# Patient Record
Sex: FEMALE | Race: ASIAN | Employment: FULL TIME
[De-identification: names, ages, dates, MRNs, and addresses within clinical notes are randomized per-mention and may not be internally consistent; named-entity substitution may affect disease eponyms.]

---

## 2017-07-29 ENCOUNTER — HEALTH MAINTENANCE LETTER (OUTPATIENT)
Age: 32
End: 2017-07-29

## 2018-01-14 ENCOUNTER — TRANSFERRED RECORDS (OUTPATIENT)
Dept: HEALTH INFORMATION MANAGEMENT | Facility: CLINIC | Age: 33
End: 2018-01-14

## 2018-01-15 ENCOUNTER — RADIANT APPOINTMENT (OUTPATIENT)
Dept: GENERAL RADIOLOGY | Facility: CLINIC | Age: 33
End: 2018-01-15
Attending: NURSE PRACTITIONER
Payer: OTHER MISCELLANEOUS

## 2018-01-15 ENCOUNTER — OFFICE VISIT (OUTPATIENT)
Dept: FAMILY MEDICINE | Facility: CLINIC | Age: 33
End: 2018-01-15
Payer: OTHER MISCELLANEOUS

## 2018-01-15 VITALS
HEART RATE: 99 BPM | OXYGEN SATURATION: 96 % | TEMPERATURE: 100.3 F | HEIGHT: 61 IN | RESPIRATION RATE: 18 BRPM | BODY MASS INDEX: 40.67 KG/M2 | SYSTOLIC BLOOD PRESSURE: 120 MMHG | DIASTOLIC BLOOD PRESSURE: 80 MMHG | WEIGHT: 215.4 LBS

## 2018-01-15 DIAGNOSIS — M54.32 SCIATICA OF LEFT SIDE WITHOUT BACK PAIN: Primary | ICD-10-CM
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
## 2018-01-15 PROCEDURE — 72100 X-RAY EXAM L-S SPINE 2/3 VWS: CPT

## 2018-01-15 PROCEDURE — 99213 OFFICE O/P EST LOW 20 MIN: CPT | Performed by: NURSE PRACTITIONER

## 2018-01-15 RX ORDER — OMEGA-3 FATTY ACIDS/FISH OIL 300-1000MG
CAPSULE ORAL
COMMUNITY
Start: 2009-11-27 | End: 2018-01-15

## 2018-01-15 RX ORDER — OXYCODONE AND ACETAMINOPHEN 5; 325 MG/1; MG/1
TABLET ORAL
COMMUNITY
Start: 2009-11-27 | End: 2018-03-20

## 2018-01-15 RX ORDER — METHYLPREDNISOLONE 4 MG
TABLET, DOSE PACK ORAL
Qty: 21 TABLET | Refills: 0 | Status: SHIPPED | OUTPATIENT
Start: 2018-01-15 | End: 2018-03-20

## 2018-01-15 RX ORDER — CYCLOBENZAPRINE HCL 10 MG
5-10 TABLET ORAL 3 TIMES DAILY PRN
Qty: 30 TABLET | Refills: 1 | Status: SHIPPED | OUTPATIENT
Start: 2018-01-15 | End: 2018-03-20

## 2018-01-15 ASSESSMENT — PAIN SCALES - GENERAL: PAINLEVEL: MODERATE PAIN (4)

## 2018-01-15 NOTE — LETTER
[facility]  [address] [place] [address]  [place] [address]  [phone]          January 15, 2018    RE:  Santos Bagley                                                                                                                                                       [address] [facility] [address]            To whom it may concern:    Santos Bagley is under my professional care for    Sciatica of left side without back pain  Fall, subsequent encounter   She  may return to work with the following: The employee is ABLE to return to work today.    When the patient returns to work, the following restrictions apply until 1/29/18:  A) Bend: Occasionally (1-3 hours)  B) Squat: Occasionally (1-3 hours)  C) Walk/Stand: Frequently (4-8 hours)  D) Reach Above Shoulders: Frequently (4-8 hours)  E) Lift, carry, push, and pull no more than:  11-20 lbs.Light duty-unable to lift more than 20 pounds.      Sincerely,        Gina Maldonado RN, CNP

## 2018-01-15 NOTE — PROGRESS NOTES
SUBJECTIVE:   Santos Bagley is a 32 year old female who presents to clinic today for the following health issues:      Back Pain       Duration: x3 weeks        Specific cause: none    Description:   Location of pain: low back left  Character of pain: sharp, dull ache, stabbing, burning and cramping  Pain radiation:radiates into the left buttocks and radiates into the left leg  New numbness or weakness in legs, not attributed to pain:  YES- to whole leg.    Intensity: Currently 4/10    History:   Pain interferes with job: YES  History of back problems: no prior back problems  Any previous MRI or X-rays: None  Sees a specialist for back pain:  No  Therapies tried without relief: acetaminophen (Tylenol), cold, heat and muscle relaxants    Alleviating factors:   Improved by: none      Precipitating factors:  Worsened by: Lifting, Bending, Standing, Sitting and Lying Flat    Functional and Psychosocial Screen (Bebeto STarT Back):      Not performed today          Accompanying Signs & Symptoms:  Risk of Fracture:  Recent history of trauma or blunt force  Risk of Cauda Equina:  None  Risk of Infection:  None  Risk of Cancer:  None  Risk of Ankylosing Spondylitis:  Onset at age <35, male, AND morning back stiffness. no     Patient had a slip and fall on the ice, landing on her abdomen 3 weeks ago.  Patient was seen at an Urgent Care, but did not have imaging done.  Patient was given prednisone, percocet, muscle relaxer.  She noted some improvement, but pain has since returned.  Patient was seen in ED yesterday due to pain and was advised to follow-up with primary care for physical therapy.  No imaging was done.      Problem list and histories reviewed & adjusted, as indicated.  Additional history: as documented    Patient Active Problem List   Diagnosis     Chronic tonsillitis     Obesity     Tobacco abuse     Hyperlipidemia LDL goal <160     Past Surgical History:   Procedure Laterality Date     TUBAL LIGATION  2009      "  Social History   Substance Use Topics     Smoking status: Former Smoker     Packs/day: 0.50     Types: Cigarettes     Smokeless tobacco: Never Used      Comment: 2 cig/day     Alcohol use No     History reviewed. No pertinent family history.      Current Outpatient Prescriptions   Medication Sig Dispense Refill     oxyCODONE-acetaminophen (PERCOCET) 5-325 MG per tablet        methylPREDNISolone (MEDROL DOSEPAK) 4 MG tablet Follow package instructions 21 tablet 0     cyclobenzaprine (FLEXERIL) 10 MG tablet Take 0.5-1 tablets (5-10 mg) by mouth 3 times daily as needed for muscle spasms 30 tablet 1     acetaminophen (TYLENOL) 325 MG tablet Take 1-2 tablets by mouth every 6 hours as needed.       Ibuprofen 200 MG capsule Take 200 mg by mouth every 4 hours as needed.       Allergies   Allergen Reactions     Nkda [No Known Drug Allergies]      BP Readings from Last 3 Encounters:   01/15/18 120/80   07/12/16 120/76   01/07/15 122/86    Wt Readings from Last 3 Encounters:   01/15/18 215 lb 6.4 oz (97.7 kg)   07/12/16 217 lb (98.4 kg)   01/07/15 210 lb (95.3 kg)                  Labs reviewed in EPIC        Reviewed and updated as needed this visit by clinical staff       Reviewed and updated as needed this visit by Provider         ROS:  Constitutional, HEENT, cardiovascular, pulmonary, gi and gu systems are negative, except as otherwise noted.      OBJECTIVE:   /80 (BP Location: Right arm, Cuff Size: Adult Large)  Pulse 99  Temp 100.3  F (37.9  C) (Oral)  Resp 18  Ht 5' 0.5\" (1.537 m)  Wt 215 lb 6.4 oz (97.7 kg)  LMP 12/25/2017 (Approximate)  SpO2 96%  Breastfeeding? No  BMI 41.37 kg/m2  Body mass index is 41.37 kg/(m^2).  GENERAL: healthy, alert and no distress  RESP: lungs clear to auscultation - no rales, rhonchi or wheezes  CV: regular rate and rhythm, normal S1 S2, no S3 or S4, no murmur, click or rub, no peripheral edema and peripheral pulses strong  MS: no gross musculoskeletal defects noted, no " edema  Comprehensive back pain exam:  No tenderness, Range of motion not limited by pain, Lower extremity strength functional and equal on both sides, Lower extremity reflexes within normal limits bilaterally, Lower extremity sensation normal and equal on both sides and Straight leg positive on  left, indicating possible ipsilateral radiculopathy  PSYCH: mentation appears normal, affect normal/bright    Diagnostic Test Results:  pending    ASSESSMENT/PLAN:     1. Sciatica of left side without back pain  If no improvement with repeat medrol dosepak, physical therapy, consider MRI in 2-3 weeks.  Rule out fracture with x-ray as below.  - methylPREDNISolone (MEDROL DOSEPAK) 4 MG tablet; Follow package instructions  Dispense: 21 tablet; Refill: 0  - cyclobenzaprine (FLEXERIL) 10 MG tablet; Take 0.5-1 tablets (5-10 mg) by mouth 3 times daily as needed for muscle spasms  Dispense: 30 tablet; Refill: 1  - [facility] PT, HAND, AND CHIROPRACTIC REFERRAL    2. Fall, subsequent encounter    - XR Lumbar Spine 2/3 Views; Future    FUTURE APPOINTMENTS:       - Follow-up for annual visit or as needed    CIPRIANO Perez [facility]

## 2018-01-15 NOTE — MR AVS SNAPSHOT
After Visit Summary   1/15/2018    Santos Bagley    MRN: [id number]           Patient Information     Date Of Birth          1985        Visit Information        Provider Department      1/15/2018 12:20 PM Gina Maldonado APRN [facility]        Today's Diagnoses     Sciatica of left side without back pain    -  1    Fall, subsequent encounter          Care Instructions    [facility]    If you have any questions regarding to your visit please contact your care team:     Team Pink:   Clinic Hours Telephone Number   Internal Medicine:  Dr. Tierra Maldonado, NP       7am-7pm  Monday - Thursday   7am-5pm  Fridays  [phone]  (Appointment scheduling available 24/7)    Questions about your visit?  Team Line  [phone]   Urgent Care - [place]ch and [place] - 11am-9pm Monday-Friday Saturday-Sunday- 9am-5pm   [place] - 5pm-9pm Monday-Friday Saturday-Sunday- 9am-5pm  [phone] - [place]   [phone] - [place]       What options do I have for visits at the clinic other than the traditional office visit?  To expand how we care for you, many of our providers are utilizing electronic visits (e-visits) and telephone visits, when medically appropriate, for interactions with their patients rather than a visit in the clinic.   We also offer nurse visits for many medical concerns. Just like any other service, we will bill your insurance company for this type of visit based on time spent on the phone with your provider. Not all insurance companies cover these visits. Please check with your medical insurance if this type of visit is covered. You will be responsible for any charges that are not paid by your insurance.      E-visits via [employer]:  generally incur a $35.00 fee.  Telephone visits:  Time spent on the phone: *charged based on time that is spent on the phone in increments of 10 minutes. Estimated cost:    5-10 mins $30.00   11-20 mins. $59.00   21-30 mins. $85.00   Use [employer] (secure email communication and access to your chart) to send your primary care provider a message or make an appointment. Ask someone on your Team how to sign up for [employer].    For a Price Quote for your services, please call our [employer] Price Line at [phone].    As always, Thank you for trusting us with your health care needs!    Discharged By:  TEX SANTOS            Follow-ups after your visit        Additional Services     [facility] PT, HAND, AND CHIROPRACTIC REFERRAL       **This order will print in the [facility] Scheduling Office**    Physical Therapy, Hand Therapy and Chiropractic Care are available through:    *[facility]  *[facility]  *[facility]    Call one number to schedule at any of the above locations: [phone].    Your provider has referred you to: Physical Therapy at [facility] or [facility]    Indication/Reason for Referral: Low Back Pain  Onset of Illness: 3 weeks  Therapy Orders: Evaluate and Treat  Special Programs: None  Special Request: Wes Tate      Additional Comments for the Therapist or Chiropractor:     Please be aware that coverage of these services is subject to the terms and limitations of your health insurance plan.  Call member services at your health plan with any benefit or coverage questions.      Please bring the following to your appointment:    *Your personal calendar for scheduling future appointments  *Comfortable clothing                  Future tests that were ordered for you today     Open Future Orders        Priority Expected Expires Ordered    XR Lumbar Spine 2/3 Views Routine 1/15/2018 1/15/2019 1/15/2018            Who to contact     If you have questions or need follow up information about today's clinic visit or your schedule please contact [facility] directly at [phone].  Normal or non-critical lab and imaging results  "will be communicated to you by [employer], letter or phone within 4 business days after the clinic has received the results. If you do not hear from us within 7 days, please contact the clinic through [employer] or phone. If you have a critical or abnormal lab result, we will notify you by phone as soon as possible.  Submit refill requests through [employer] or call your pharmacy and they will forward the refill request to us. Please allow 3 business days for your refill to be completed.          Additional Information About Your Visit        [employer] Information     [employer] gives you secure access to your electronic health record. If you see a primary care provider, you can also send messages to your care team and make appointments. If you have questions, please call your primary care clinic.  If you do not have a primary care provider, please call [phone] and they will assist you.        Care EveryWhere ID     This is your Care EveryWhere ID. This could be used by other organizations to access your [place] medical records  [id number]        Your Vitals Were     Pulse Temperature Respirations Height Last Period Pulse Oximetry    99 100.3  F (37.9  C) (Oral) 18 5' 0.5\" (1.537 m) 12/25/2017 (Approximate) 96%    Breastfeeding? BMI (Body Mass Index)                No 41.37 kg/m2           Blood Pressure from Last 3 Encounters:   01/15/18 120/80   07/12/16 120/76   01/07/15 122/86    Weight from Last 3 Encounters:   01/15/18 215 lb 6.4 oz (97.7 kg)   07/12/16 217 lb (98.4 kg)   01/07/15 210 lb (95.3 kg)              We Performed the Following     [facility] PT, HAND, AND CHIROPRACTIC REFERRAL          Today's Medication Changes          These changes are accurate as of: 1/15/18  1:05 PM.  If you have any questions, ask your nurse or doctor.               Start taking these medicines.        Dose/Directions    cyclobenzaprine 10 MG tablet   Commonly known as:  FLEXERIL   Used for:  Sciatica of left side without back pain "   Started by:  Gina Maldonado APRN CNP        Dose:  5-10 mg   Take 0.5-1 tablets (5-10 mg) by mouth 3 times daily as needed for muscle spasms   Quantity:  30 tablet   Refills:  1       methylPREDNISolone 4 MG tablet   Commonly known as:  MEDROL DOSEPAK   Used for:  Sciatica of left side without back pain   Started by:  Gina Maldonado APRN CNP        Follow package instructions   Quantity:  21 tablet   Refills:  0            Where to get your medicines      These medications were sent to [facility] [address] [facility], [facility] [address]  [id number] [place] [address], [facility] [address]     Phone:  [phone]     cyclobenzaprine 10 MG tablet    methylPREDNISolone 4 MG tablet                Primary Care Provider Office Phone # Fax #    Kristen M Kehr, PA-C [phone] [address] [facility] [address]        Equal Access to Services     CANDY NICOLE : Hadii aad ku hadasho Soomaali, waaxda luqadaha, qaybta kaalmada adeegyada, altagracia madera . So [facility] [phone].    ATENCIÓN: Si bina john, tiene a saxena disposición servicios gratuitos de asistencia lingüística. [facility] [phone].    We comply with applicable federal civil rights laws and [place] laws. We do not discriminate on the basis of race, color, national origin, age, disability, sex, sexual orientation, or gender identity.            Thank you!     Thank you for choosing [facility]  for your care. Our goal is always to provide you with excellent care. Hearing back from our patients is one way we can continue to improve our services. Please take a few minutes to complete the written survey that you may receive in the mail after your visit with us. Thank you!             Your Updated Medication List - Protect others around you: Learn how to safely use, store and throw away your medicines at www.disposemymeds.org.          This list is accurate as of:  1/15/18  1:05 PM.  Always use your most recent med list.                   Brand Name Dispense Instructions for use Diagnosis    cyclobenzaprine 10 MG tablet    FLEXERIL    30 tablet    Take 0.5-1 tablets (5-10 mg) by mouth 3 times daily as needed for muscle spasms    Sciatica of left side without back pain       ibuprofen 200 MG capsule      Take 200 mg by mouth every 4 hours as needed.        methylPREDNISolone 4 MG tablet    MEDROL DOSEPAK    21 tablet    Follow package instructions    Sciatica of left side without back pain       oxyCODONE-acetaminophen 5-325 MG per tablet    PERCOCET          TYLENOL 325 MG tablet   Generic drug:  acetaminophen      Take 1-2 tablets by mouth every 6 hours as needed.

## 2018-01-15 NOTE — NURSING NOTE
"Chief Complaint   Patient presents with     Back Pain     sciatic nerve pain. Due for DAP, PHQ9 and Flu       Initial /80 (BP Location: Right arm, Cuff Size: Adult Large)  Pulse 99  Temp 100.3  F (37.9  C) (Oral)  Resp 18  Ht 5' 0.5\" (1.537 m)  Wt 215 lb 6.4 oz (97.7 kg)  LMP 12/25/2017 (Approximate)  SpO2 96%  Breastfeeding? No  BMI 41.37 kg/m2 Estimated body mass index is 41.37 kg/(m^2) as calculated from the following:    Height as of this encounter: 5' 0.5\" (1.537 m).    Weight as of this encounter: 215 lb 6.4 oz (97.7 kg).  Medication Reconciliation: complete       Alyse Stovall, CMA    "

## 2018-01-15 NOTE — PATIENT INSTRUCTIONS
[facility]    If you have any questions regarding to your visit please contact your care team:     Team Pink:   Clinic Hours Telephone Number   Internal Medicine:  Dr. Tierra Maldonado NP       7am-7pm  Monday - Thursday   7am-5pm  Fridays  [phone]  (Appointment scheduling available 24/7)    Questions about your visit?  Team Line  [phone]   [facility] - 11am-9pm Monday-Friday Saturday-Sunday- 9am-5pm   [place] - 5pm-9pm Monday-Friday Saturday-Sunday- 9am-5pm  [phone] - [place]   [phone] - [place]       What options do I have for visits at the clinic other than the traditional office visit?  To expand how we care for you, many of our providers are utilizing electronic visits (e-visits) and telephone visits, when medically appropriate, for interactions with their patients rather than a visit in the clinic.   We also offer nurse visits for many medical concerns. Just like any other service, we will bill your insurance company for this type of visit based on time spent on the phone with your provider. Not all insurance companies cover these visits. Please check with your medical insurance if this type of visit is covered. You will be responsible for any charges that are not paid by your insurance.      E-visits via [employer]:  generally incur a $35.00 fee.  Telephone visits:  Time spent on the phone: *charged based on time that is spent on the phone in increments of 10 minutes. Estimated cost:   5-10 mins $30.00   11-20 mins. $59.00   21-30 mins. $85.00   Use [employer] (secure email communication and access to your chart) to send your primary care provider a message or make an appointment. Ask someone on your Team how to sign up for [employer].    For a Price Quote for your services, please call our Consumer Price Line at [phone].    As always, Thank you for trusting us with your health care  needs!    Discharged By:  TEX SANTOS

## 2018-01-16 NOTE — PROGRESS NOTES
Dear Santos,    Your recent test results are attached.      No fractures noted.    If you have any questions please feel free to contact [phone] or myself via [employer].    Sincerely,  Gina Maldonado, CNP

## 2018-03-20 ENCOUNTER — OFFICE VISIT (OUTPATIENT)
Dept: FAMILY MEDICINE | Facility: CLINIC | Age: 33
End: 2018-03-20
Payer: OTHER MISCELLANEOUS

## 2018-03-20 VITALS
OXYGEN SATURATION: 96 % | DIASTOLIC BLOOD PRESSURE: 85 MMHG | HEART RATE: 102 BPM | SYSTOLIC BLOOD PRESSURE: 124 MMHG | BODY MASS INDEX: 42.99 KG/M2 | TEMPERATURE: 98.1 F | WEIGHT: 223.8 LBS
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
## 2018-03-20 DIAGNOSIS — M54.50 LUMBAR PAIN WITH RADIATION DOWN LEFT LEG: ICD-10-CM

## 2018-03-20 DIAGNOSIS — M79.605 LUMBAR PAIN WITH RADIATION DOWN LEFT LEG: ICD-10-CM

## 2018-03-20 PROCEDURE — 99214 OFFICE O/P EST MOD 30 MIN: CPT | Performed by: NURSE PRACTITIONER

## 2018-03-20 RX ORDER — LIDOCAINE 50 MG/G
PATCH TOPICAL
Qty: 30 PATCH | Refills: 0 | Status: SHIPPED | OUTPATIENT
Start: 2018-03-20 | End: 2019-01-22

## 2018-03-20 RX ORDER — METHOCARBAMOL 750 MG/1
750 TABLET, FILM COATED ORAL 3 TIMES DAILY PRN
Qty: 60 TABLET | Refills: 0 | Status: SHIPPED | OUTPATIENT
Start: 2018-03-20 | End: 2018-09-25

## 2018-03-20 NOTE — NURSING NOTE
"Chief Complaint   Patient presents with     Pain       Initial /85  Pulse 102  Temp 98.1  F (36.7  C) (Oral)  Wt 223 lb 12.8 oz (101.5 kg)  SpO2 96%  BMI 42.99 kg/m2 Estimated body mass index is 42.99 kg/(m^2) as calculated from the following:    Height as of 1/15/18: 5' 0.5\" (1.537 m).    Weight as of this encounter: 223 lb 12.8 oz (101.5 kg).  Medication Reconciliation: complete     Nany Reyes MA  "

## 2018-03-20 NOTE — PATIENT INSTRUCTIONS
"Take medication as prescribed.  Schedule MRI.  Based on imaging results, we will either order physical therapy, spine referral, chiropractor, etc. Follow up if needed for worsening symptoms.   * Sciatica    Sciatica (\"Lumbar Radiculopathy\") causes a pain that spreads from the lower back down into the buttock, hip and leg. Sometimes leg pain can occur without any back pain. Sciatica is due to irritation or pressure on a spinal nerve as it comes out of the spinal canal. This is most often due to a bulge or rupture of a nearby spinal disk (the cartilage cushion between each spinal bone), which presses on a nearby nerve. Other causes include spinal stenosis (narrowing of the spinal canal) and spasm of the piriformis muscle (a muscle in the buttocks that the sciatic nerve passes through).  Sciatica may begin after a sudden twisting/bending force (such as in a car accident), or sometimes after a simple awkward movement. In either case, muscle spasm is commonly present and contributes to the pain.  The diagnosis of sciatica is made from the symptoms and physical exam. Unless you had a physical injury (such as a car accident or fall), X-rays are usually not ordered for the initial evaluation of sciatica because the nerves and disks cannot be seen on an X-ray. Most sciatica (80-90%) gets better with time.  What can I do about my low back pain?  There are three main things you can do to ease low back pain and help it go away.    Use heat or cold packs.    Take medicine as directed.    Use positions, movements and exercises. Stay active! Too much rest can make your symptoms worse.  Using heat or cold packs  Try cold packs or gentle heat to ease your pain. Use whichever gives the most relief. Apply the cold pack or heat for 15 minutes at a time, as often as needed.  Taking medicine  If taking over-the-counter medicine:    Take ibuprofen (Advil, Motrin) 600 mg. three times a day as needed for pain.  OR    Take Aleve (naproxen " sodium) 220 to 440 mg. two times a day as needed for pain  If your doctor prescribed a muscle relaxant (cyclobenzapine 10 mg.):    Take one half ( ) to 1 tablet at bedtime    Do not drive when taking this medicine. This drug may make you sleepy.  Using positions, movements and exercises  Research tells us that moving your joints and muscles can help you recover from back pain. Such activity should be simple and gentle.  Use the positions below as well as walking to help relieve your discomfort. Try taking a short walk every 3 to 4 hours during the day. Walk for a few minutes inside your home or take longer walks outside, on a treadmill or at a mall. Slowly increase the amount of time you walk. Expect discomfort when you begin, but it should lessen as your back starts to recover.  Finding a position that is comfortable  When your back pain is new, you may find that certain positions will ease your pain. Gently try each of the following positions until you find one that eases your pain. Once you find a position of comfort, use it as often as you like while you recover. Return to your daily routine as soon as possible.     Lie on your back with your legs bent. You can do this by placing a pillow under your knees or lie on the floor and rest your lower legs on the seat of a chair.    Lie on your side with your knees bent and place a pillow between your knees.    Lie on your stomach over pillows.  When should I call my doctor?  Your back pain should improve over the first couple of weeks. As it improves, you should be able to return to your normal activities. But call your doctor if:    You have a sudden change in your ability to control? your bladder or bowels.    You begin to feel tingling in your groin or legs.    The pain spreads down your leg and into your foot.    Your toes, feet or leg muscles begin to feel weak.    You feel generally unwell or sick.    Your pain gets worse.    2821-9587 [employer]  [address] [place] [address]. All rights reserved. This information is not intended as a substitute for professional medical care. Always follow your healthcare professional's instructions.  This information has been modified by your health care provider with permission from the publisher.

## 2018-03-20 NOTE — MR AVS SNAPSHOT
"              After Visit Summary   3/20/2018    Santos Bagley    MRN: [id number]           Patient Information     Date Of Birth          1985        Visit Information        Provider Department      3/20/2018 2:20 PM Diamond Keys NP [facility]        Today's Diagnoses     Lumbar pain with radiation down left leg    -  1      Care Instructions    leann medication as prescribed.  Schedule MRI.  Based on imaging results, we will either order physical therapy, spine referral, chiropractor, etc. Follow up if needed for worsening symptoms.   * Sciatica    Sciatica (\"Lumbar Radiculopathy\") causes a pain that spreads from the lower back down into the buttock, hip and leg. Sometimes leg pain can occur without any back pain. Sciatica is due to irritation or pressure on a spinal nerve as it comes out of the spinal canal. This is most often due to a bulge or rupture of a nearby spinal disk (the cartilage cushion between each spinal bone), which presses on a nearby nerve. Other causes include spinal stenosis (narrowing of the spinal canal) and spasm of the piriformis muscle (a muscle in the buttocks that the sciatic nerve passes through).  Sciatica may begin after a sudden twisting/bending force (such as in a car accident), or sometimes after a simple awkward movement. In either case, muscle spasm is commonly present and contributes to the pain.  The diagnosis of sciatica is made from the symptoms and physical exam. Unless you had a physical injury (such as a car accident or fall), X-rays are usually not ordered for the initial evaluation of sciatica because the nerves and disks cannot be seen on an X-ray. Most sciatica (80-90%) gets better with time.  What can I do about my low back pain?  There are three main things you can do to ease low back pain and help it go away.    Use heat or cold packs.    Take medicine as directed.    Use positions, movements and exercises. Stay active! Too much rest can make your " symptoms worse.  Using heat or cold packs  Try cold packs or gentle heat to ease your pain. Use whichever gives the most relief. Apply the cold pack or heat for 15 minutes at a time, as often as needed.  Taking medicine  If taking over-the-counter medicine:    Take ibuprofen (Advil, Motrin) 600 mg. three times a day as needed for pain.  OR    Take Aleve (naproxen sodium) 220 to 440 mg. two times a day as needed for pain  If your doctor prescribed a muscle relaxant (cyclobenzapine 10 mg.):    Take one half ( ) to 1 tablet at bedtime    Do not drive when taking this medicine. This drug may make you sleepy.  Using positions, movements and exercises  Research tells us that moving your joints and muscles can help you recover from back pain. Such activity should be simple and gentle.  Use the positions below as well as walking to help relieve your discomfort. Try taking a short walk every 3 to 4 hours during the day. Walk for a few minutes inside your home or take longer walks outside, on a treadmill or at a mall. Slowly increase the amount of time you walk. Expect discomfort when you begin, but it should lessen as your back starts to recover.  Finding a position that is comfortable  When your back pain is new, you may find that certain positions will ease your pain. Gently try each of the following positions until you find one that eases your pain. Once you find a position of comfort, use it as often as you like while you recover. Return to your daily routine as soon as possible.     Lie on your back with your legs bent. You can do this by placing a pillow under your knees or lie on the floor and rest your lower legs on the seat of a chair.    Lie on your side with your knees bent and place a pillow between your knees.    Lie on your stomach over pillows.  When should I call my doctor?  Your back pain should improve over the first couple of weeks. As it improves, you should be able to return to your normal activities.  But call your doctor if:    You have a sudden change in your ability to control? your bladder or bowels.    You begin to feel tingling in your groin or legs.    The pain spreads down your leg and into your foot.    Your toes, feet or leg muscles begin to feel weak.    You feel generally unwell or sick.    Your pain gets worse.    1571-5560 [employer]. [address] [place] [address]. All rights reserved. This information is not intended as a substitute for professional medical care. Always follow your healthcare professional's instructions.  This information has been modified by your health care provider with permission from the publisher.                Follow-ups after your visit        Follow-up notes from your care team     Return if symptoms worsen or fail to improve.      Your next 10 appointments already scheduled     Mar 22, 2018  9:20 AM CDT   PHYSICAL with CIPRIANO Cuevas CNP   [facility] ([facility]    [address] [place] [id number] [phone]              Future tests that were ordered for you today     Open Future Orders        Priority Expected Expires Ordered    MR Lumbar Spine w/o Contrast Routine  3/20/2019 3/20/2018            Who to contact     Normal or non-critical lab and imaging results will be communicated to you by MyChart, letter or phone within 4 business days after the clinic has received the results. If you do not hear from us within 7 days, please contact the clinic through MyChart or phone. If you have a critical or abnormal lab result, we will notify you by phone as soon as possible.  Submit refill requests through [employer] or call your pharmacy and they will forward the refill request to us. Please allow 3 business days for your refill to be completed.          If you need to speak with a  for additional information , please call: [phone]             Additional Information About  Your Visit        [employer] Information     [employer] gives you secure access to your electronic health record. If you see a primary care provider, you can also send messages to your care team and make appointments. If you have questions, please call your primary care clinic.  If you do not have a primary care provider, please call [phone] and they will assist you.        Care EveryWhere ID     This is your Care EveryWhere ID. This could be used by other organizations to access your [place] medical records  [id number]        Your Vitals Were     Pulse Temperature Pulse Oximetry BMI (Body Mass Index)          102 98.1  F (36.7  C) (Oral) 96% 42.99 kg/m2         Blood Pressure from Last 3 Encounters:   03/20/18 124/85   01/15/18 120/80   07/12/16 120/76    Weight from Last 3 Encounters:   03/20/18 223 lb 12.8 oz (101.5 kg)   01/15/18 215 lb 6.4 oz (97.7 kg)   07/12/16 217 lb (98.4 kg)                 Today's Medication Changes          These changes are accurate as of 3/20/18  2:41 PM.  If you have any questions, ask your nurse or doctor.               Start taking these medicines.        Dose/Directions    diclofenac 50 MG EC tablet   Commonly known as:  VOLTAREN   Used for:  Lumbar pain with radiation down left leg   Started by:  Diamond Keys NP        Dose:  50 mg   Take 1 tablet (50 mg) by mouth 3 times daily as needed for moderate pain (take with food)   Quantity:  90 tablet   Refills:  1       lidocaine 5 % Patch   Commonly known as:  LIDODERM   Used for:  Lumbar pain with radiation down left leg   Started by:  Diamond Keys NP        Apply up to 3 patches to painful area at once for up to 12 h within a 24 h period.  Remove after 12 hours.   Quantity:  30 patch   Refills:  0       methocarbamol 750 MG tablet   Commonly known as:  ROBAXIN   Used for:  Lumbar pain with radiation down left leg   Started by:  Diamond Keys NP        Dose:  750 mg   Take 1 tablet (750 mg) by mouth 3 times daily as  needed for muscle spasms   Quantity:  60 tablet   Refills:  0         Stop taking these medicines if you haven't already. Please contact your care team if you have questions.     ibuprofen 200 MG capsule   Stopped by:  Diamond Keys NP           TYLENOL 325 MG tablet   Generic drug:  acetaminophen   Stopped by:  Diamond Keys NP                Where to get your medicines      These medications were sent to [facility] [place] - [address] [facility]  [address] [facility] [address]     Phone:  [phone]     diclofenac 50 MG EC tablet    lidocaine 5 % Patch    methocarbamol 750 MG tablet                Primary Care Provider Office Phone # Fax #    Gina Ferguson Ovidio, APRN [facility] [phone]       [address] [facility]  [facility] [address]        Equal Access to Services     [facility]: Hadii aad ku hadasho Soomaali, waaxda luqadaha, qaybta kaalmada adeegyada, waxay teresain haylamont madera . So [facility] [phone].    ATENCIÓN: Si habla español, tiene a saxena disposición servicios gratuitos de asistencia lingüística. Rozina al [phone].    We comply with applicable federal civil rights laws and [place] laws. We do not discriminate on the basis of race, color, national origin, age, disability, sex, sexual orientation, or gender identity.            Thank you!     Thank you for choosing [facility]  for your care. Our goal is always to provide you with excellent care. Hearing back from our patients is one way we can continue to improve our services. Please take a few minutes to complete the written survey that you may receive in the mail after your visit with us. Thank you!             Your Updated Medication List - Protect others around you: Learn how to safely use, store and throw away your medicines at www.disposemymeds.org.          This list is accurate as of 3/20/18  2:41 PM.  Always use your most recent med list.                   Brand  Name Dispense Instructions for use Diagnosis    diclofenac 50 MG EC tablet    VOLTAREN    90 tablet    Take 1 tablet (50 mg) by mouth 3 times daily as needed for moderate pain (take with food)    Lumbar pain with radiation down left leg       lidocaine 5 % Patch    LIDODERM    30 patch    Apply up to 3 patches to painful area at once for up to 12 h within a 24 h period.  Remove after 12 hours.    Lumbar pain with radiation down left leg       methocarbamol 750 MG tablet    ROBAXIN    60 tablet    Take 1 tablet (750 mg) by mouth 3 times daily as needed for muscle spasms    Lumbar pain with radiation down left leg       NAPROXEN PO

## 2018-03-20 NOTE — PROGRESS NOTES
SUBJECTIVE:   Santos Bagley is a 33 year old female who presents to clinic today for the following health issues:      Following up on: back and leg pain- due to fall at work      Last visit this was discussed: 1/15/18 with Bonnie; normal xray per pt    Progression of Symptoms:  Same- currently 8/10    Accompanying Signs & Symptoms: left back pain into left leg-numb, swelling, tingling, cramping in the leg, given out    Taking Medication as prescribed: yes    Side Effects:  None    Medication Helping Symptoms:  NO-went away for a bit but back now; feels chiropractor made pain worse.          Problem list and histories reviewed & adjusted, as indicated.  Additional history: as documented    Patient Active Problem List   Diagnosis     Chronic tonsillitis     Hyperlipidemia LDL goal <160     Class 2 obesity due to excess calories without serious comorbidity with body mass index (BMI) of 35.0 to 35.9 in adult     Impaired fasting glucose     Past Surgical History:   Procedure Laterality Date     TUBAL LIGATION  2009       Social History   Substance Use Topics     Smoking status: Former Smoker     Packs/day: 0.50     Types: Cigarettes     Smokeless tobacco: Never Used      Comment: 2 cig/day     Alcohol use No     History reviewed. No pertinent family history.      Current Outpatient Prescriptions   Medication Sig Dispense Refill     NAPROXEN PO        diclofenac (VOLTAREN) 50 MG EC tablet Take 1 tablet (50 mg) by mouth 3 times daily as needed for moderate pain (take with food) 90 tablet 1     methocarbamol (ROBAXIN) 750 MG tablet Take 1 tablet (750 mg) by mouth 3 times daily as needed for muscle spasms 60 tablet 0     lidocaine (LIDODERM) 5 % Patch Apply up to 3 patches to painful area at once for up to 12 h within a 24 h period.  Remove after 12 hours. (Patient not taking: Reported on 3/22/2018) 30 patch 0     Allergies   Allergen Reactions     Nkda [No Known Drug Allergies]      BP Readings from Last 3 Encounters:    03/22/18 104/72   03/20/18 124/85   01/15/18 120/80    Wt Readings from Last 3 Encounters:   03/22/18 221 lb (100.2 kg)   03/20/18 223 lb 12.8 oz (101.5 kg)   01/15/18 215 lb 6.4 oz (97.7 kg)                  Labs reviewed in EPIC    Reviewed and updated as needed this visit by clinical staff  Tobacco  Allergies  Meds  Med Hx  Surg Hx  Fam Hx  Soc Hx      Reviewed and updated as needed this visit by Provider         ROS:  Constitutional, HEENT, cardiovascular, pulmonary, GI, , musculoskeletal, neuro, skin, endocrine and psych systems are negative, except as otherwise noted.    OBJECTIVE:     /85  Pulse 102  Temp 98.1  F (36.7  C) (Oral)  Wt 223 lb 12.8 oz (101.5 kg)  SpO2 96%  BMI 42.99 kg/m2  Body mass index is 42.99 kg/(m^2).  GENERAL: healthy, alert and no distress  NECK: no adenopathy, no asymmetry, masses, or scars and thyroid normal to palpation  RESP: lungs clear to auscultation - no rales, rhonchi or wheezes  CV: regular rate and rhythm, normal S1 S2, no S3 or S4, no murmur, click or rub, no peripheral edema and peripheral pulses strong  MS: no gross musculoskeletal defects noted, no edema POSITIVE for lumbar back pain with dependent leg raise > 90 degrees. No pain with palpation of spine.   NEURO: Normal strength and tone, mentation intact and speech normal    Diagnostic Test Results:  See orders    ASSESSMENT/PLAN:         ICD-10-CM    1. Fall, subsequent encounter W19.XXXD    2. Lumbar pain with radiation down left leg M54.5 diclofenac (VOLTAREN) 50 MG EC tablet     methocarbamol (ROBAXIN) 750 MG tablet     lidocaine (LIDODERM) 5 % Patch     MR Lumbar Spine w/o Contrast       See Patient Instructions: Take medication as prescribed.  Schedule MRI.  Based on imaging results, we will either order physical therapy, spine referral, chiropractor, etc. Follow up if needed for worsening symptoms.     Diamond Keys, P  [facility]

## 2018-03-21 ENCOUNTER — TELEPHONE (OUTPATIENT)
Dept: FAMILY MEDICINE | Facility: CLINIC | Age: 33
End: 2018-03-21

## 2018-03-21 NOTE — TELEPHONE ENCOUNTER
Hello,    This drug Lidocane 5% Patch requires prior authorization. Please contact insurance at [phone] .   Patient's ID [id number] .    Please contact pharmacy when prior authorization has been approved. We will contact patient when order has been filled.       RxBin: [id number]  RxGroup: [id number]  RxPCN: N/A      Thanks,   Zara Johnson, Pharmacy Tech  [place]

## 2018-03-21 NOTE — TELEPHONE ENCOUNTER
Prior Authorization Retail Medication Request    Medication/Dose: Lidocane 5% Patch  ICD code (if different than what is on RX):  Lumbar pain with radiation down left leg [M54.5]  Previously Tried and Failed:  Chiropractic care felt worse (per patient)  Rationale:  Work related injury    Insurance Name: [phone]  [employer]URANCE (Worker's Comp)  Insurance ID:[id number]:        Pharmacy Information (if different than what is on RX)  Name:  See below  Phone:

## 2018-03-22 ENCOUNTER — TELEPHONE (OUTPATIENT)
Dept: FAMILY MEDICINE | Facility: CLINIC | Age: 33
End: 2018-03-22

## 2018-03-22 ENCOUNTER — OFFICE VISIT (OUTPATIENT)
Dept: FAMILY MEDICINE | Facility: CLINIC | Age: 33
End: 2018-03-22
Payer: COMMERCIAL

## 2018-03-22 ENCOUNTER — RADIANT APPOINTMENT (OUTPATIENT)
Dept: MRI IMAGING | Facility: CLINIC | Age: 33
End: 2018-03-22
Attending: NURSE PRACTITIONER
Payer: COMMERCIAL

## 2018-03-22 VITALS
TEMPERATURE: 97.1 F | HEART RATE: 88 BPM | RESPIRATION RATE: 16 BRPM | OXYGEN SATURATION: 96 % | DIASTOLIC BLOOD PRESSURE: 72 MMHG | WEIGHT: 221 LBS | SYSTOLIC BLOOD PRESSURE: 104 MMHG | HEIGHT: 65 IN | BODY MASS INDEX: 36.82 KG/M2

## 2018-03-22 DIAGNOSIS — M79.605 LUMBAR PAIN WITH RADIATION DOWN LEFT LEG: Primary | ICD-10-CM

## 2018-03-22 DIAGNOSIS — M79.605 LUMBAR PAIN WITH RADIATION DOWN LEFT LEG: ICD-10-CM

## 2018-03-22 DIAGNOSIS — M54.50 LUMBAR PAIN WITH RADIATION DOWN LEFT LEG: ICD-10-CM

## 2018-03-22 DIAGNOSIS — E66.09 CLASS 2 OBESITY DUE TO EXCESS CALORIES WITHOUT SERIOUS COMORBIDITY WITH BODY MASS INDEX (BMI) OF 35.0 TO 35.9 IN ADULT: ICD-10-CM

## 2018-03-22 DIAGNOSIS — Z12.4 SCREENING FOR MALIGNANT NEOPLASM OF CERVIX: ICD-10-CM

## 2018-03-22 DIAGNOSIS — E66.812 CLASS 2 OBESITY DUE TO EXCESS CALORIES WITHOUT SERIOUS COMORBIDITY WITH BODY MASS INDEX (BMI) OF 35.0 TO 35.9 IN ADULT: ICD-10-CM

## 2018-03-22 DIAGNOSIS — Z00.01 ENCOUNTER FOR ROUTINE ADULT PHYSICAL EXAM WITH ABNORMAL FINDINGS: Primary | ICD-10-CM

## 2018-03-22 DIAGNOSIS — M54.50 LUMBAR PAIN WITH RADIATION DOWN LEFT LEG: Primary | ICD-10-CM

## 2018-03-22 DIAGNOSIS — Z23 NEED FOR PROPHYLACTIC VACCINATION WITH TETANUS-DIPHTHERIA (TD): ICD-10-CM

## 2018-03-22 LAB
GLUCOSE SERPL-MCNC: 110 MG/DL (ref 70–99)
TSH SERPL DL<=0.005 MIU/L-ACNC: 1.07 MU/L (ref 0.4–4)

## 2018-03-22 PROCEDURE — 99395 PREV VISIT EST AGE 18-39: CPT | Performed by: NURSE PRACTITIONER

## 2018-03-22 PROCEDURE — G0145 SCR C/V CYTO,THINLAYER,RESCR: HCPCS | Performed by: NURSE PRACTITIONER

## 2018-03-22 PROCEDURE — 72148 MRI LUMBAR SPINE W/O DYE: CPT | Mod: TC

## 2018-03-22 PROCEDURE — 90715 TDAP VACCINE 7 YRS/> IM: CPT | Performed by: NURSE PRACTITIONER

## 2018-03-22 PROCEDURE — 87624 HPV HI-RISK TYP POOLED RSLT: CPT | Performed by: NURSE PRACTITIONER

## 2018-03-22 PROCEDURE — 36415 COLL VENOUS BLD VENIPUNCTURE: CPT | Performed by: NURSE PRACTITIONER

## 2018-03-22 PROCEDURE — 84443 ASSAY THYROID STIM HORMONE: CPT | Performed by: NURSE PRACTITIONER

## 2018-03-22 PROCEDURE — 82947 ASSAY GLUCOSE BLOOD QUANT: CPT | Performed by: NURSE PRACTITIONER

## 2018-03-22 ASSESSMENT — PAIN SCALES - GENERAL: PAINLEVEL: SEVERE PAIN (7)

## 2018-03-22 NOTE — PROGRESS NOTES
SUBJECTIVE:   CC: Santos Bagley is an 33 year old woman who presents for preventive health visit.     Physical   Annual:     Getting at least 3 servings of Calcium per day::  Yes    Bi-annual eye exam::  NO    Dental care twice a year::  Yes    Sleep apnea or symptoms of sleep apnea::  None    Frequency of exercise::  2-3 days/week    Duration of exercise::  15-30 minutes    Taking medications regularly::  Yes    Medication side effects::  None    Additional concerns today::  YES          Patient notes continued weight gain.  She was recently on medrol dose devin and has reduced her activity due to recent back pain.  Patient has gained 20 lbs over the past several years and 10 lbs over the past several weeks.      Patient eats:  AM: egg, toast, coffee with creamer and sugar x2   Lunch: two package of ramen noodles  PM: bitter melon with pork, greens, rice, hot dog without bun  No snacks, root beer to drink       Today's PHQ-2 Score:   PHQ-2 ( 1999 [employer]izer) 3/21/2018   Q1: Little interest or pleasure in doing things 0   Q2: Feeling down, depressed or hopeless 0   PHQ-2 Score 0   Q1: Little interest or pleasure in doing things Not at all   Q2: Feeling down, depressed or hopeless Not at all   PHQ-2 Score 0       Abuse: Current or Past(Physical, Sexual or Emotional)- No  Do you feel safe in your environment - Yes    Social History   Substance Use Topics     Smoking status: Former Smoker     Packs/day: 0.50     Types: Cigarettes     Smokeless tobacco: Never Used      Comment: 2 cig/day     Alcohol use No     Alcohol Use 3/21/2018   If you drink alcohol do you typically have greater than 3 drinks per day OR greater than 7 drinks per week? No   No flowsheet data found.    Reviewed orders with patient.  Reviewed health maintenance and updated orders accordingly - Yes  Labs reviewed in EPIC  BP Readings from Last 3 Encounters:   03/22/18 124/90   03/20/18 124/85   01/15/18 120/80    Wt Readings from Last 3 Encounters:    03/22/18 221 lb (100.2 kg)   03/20/18 223 lb 12.8 oz (101.5 kg)   01/15/18 215 lb 6.4 oz (97.7 kg)                  Patient Active Problem List   Diagnosis     Chronic tonsillitis     Hyperlipidemia LDL goal <160     Class 2 obesity due to excess calories without serious comorbidity with body mass index (BMI) of 35.0 to 35.9 in adult     Past Surgical History:   Procedure Laterality Date     TUBAL LIGATION  2009       Social History   Substance Use Topics     Smoking status: Former Smoker     Packs/day: 0.50     Types: Cigarettes     Smokeless tobacco: Never Used      Comment: 2 cig/day     Alcohol use No     History reviewed. No pertinent family history.      Allergies   Allergen Reactions     Nkda [No Known Drug Allergies]      Recent Labs   Lab Test  09/17/13   1040  08/17/12   0804  11/27/11   1302  11/03/11   1605   04/06/10   0942   LDL  174*  152*  168*   --    < >   --    HDL  31*  31*  29*   --    < >   --    TRIG  106  229*  98   --    < >   --    CR   --    --    --   0.64   --    --    GFRESTIMATED   --    --    --   >90   --    --    GFRESTBLACK   --    --    --   >90   --    --    POTASSIUM   --    --    --   4.0   --    --    TSH   --    --    --   1.43   --   0.81    < > = values in this interval not displayed.        Mammogram not appropriate for this patient based on age.    Pertinent mammograms are reviewed under the imaging tab.  History of abnormal Pap smear:   Last 3 Pap and HPV Results:   PAP / HPV 8/23/2012 1/7/2010   PAP NIL NIL       Reviewed and updated as needed this visit by clinical staff         Reviewed and updated as needed this visit by Provider            Review of Systems  C: NEGATIVE for fever, chills, change in weight  I: NEGATIVE for worrisome rashes, moles or lesions  E: NEGATIVE for vision changes or irritation  ENT: NEGATIVE for ear, mouth and throat problems  R: NEGATIVE for significant cough or SOB  B: NEGATIVE for masses, tenderness or discharge  CV: NEGATIVE for chest  pain, palpitations or peripheral edema  GI: NEGATIVE for nausea, abdominal pain, heartburn, or change in bowel habits  : NEGATIVE for unusual urinary or vaginal symptoms. Periods are regular.  M: NEGATIVE for significant arthralgias or myalgia  N: NEGATIVE for weakness, dizziness or paresthesias  P: NEGATIVE for changes in mood or affect     OBJECTIVE:   There were no vitals taken for this visit.  Physical Exam  GENERAL: alert, no distress and obese  EYES: Eyes grossly normal to inspection, PERRL and conjunctivae and sclerae normal  HENT: ear canals and TM's normal, nose and mouth without ulcers or lesions  NECK: no adenopathy, no asymmetry, masses, or scars and thyroid normal to palpation  RESP: lungs clear to auscultation - no rales, rhonchi or wheezes  BREAST: normal without masses, tenderness or nipple discharge and no palpable axillary masses or adenopathy  CV: regular rate and rhythm, normal S1 S2, no S3 or S4, no murmur, click or rub, no peripheral edema and peripheral pulses strong  ABDOMEN: soft, nontender, no hepatosplenomegaly, no masses and bowel sounds normal   (female): normal female external genitalia, normal urethral meatus, vaginal mucosa, normal cervix/adnexa/uterus without masses or discharge  MS: no gross musculoskeletal defects noted, no edema  PSYCH: mentation appears normal, affect normal/bright    ASSESSMENT/PLAN:   1. Encounter for routine adult physical exam with abnormal findings      2. Screening for malignant neoplasm of cervix    - Pap imaged thin layer screen with HPV - recommended age 30 - 65 years (select HPV order below)  - HPV High Risk Types DNA Cervical    3. Need for prophylactic vaccination with tetanus-diphtheria (TD)    - TDAP VACCINE (ADACEL)    4. Class 2 obesity due to excess calories without serious comorbidity with body mass index (BMI) of 35.0 to 35.9 in adult  Patient to cut out creamer, sugar, and soda.  She will also start recording her calories on My[employer]itness Pal.  " Follow-up in 1 month for recheck and further work on diet.  - Glucose  - TSH with free T4 reflex    COUNSELING:  Reviewed preventive health counseling, as reflected in patient instructions       Regular exercise       Healthy diet/nutrition       Immunizations    Vaccinated for: TDAP               reports that she has quit smoking. Her smoking use included Cigarettes. She smoked 0.50 packs per day. She has never used smokeless tobacco.    Estimated body mass index is 42.99 kg/(m^2) as calculated from the following:    Height as of 1/15/18: 5' 0.5\" (1.537 m).    Weight as of 3/20/18: 223 lb 12.8 oz (101.5 kg).   Weight management plan: Discussed healthy diet and exercise guidelines and patient will follow up in 1 month in clinic to re-evaluate.    Counseling Resources:  ATP IV Guidelines  Pooled Cohorts Equation Calculator  Breast Cancer Risk Calculator  FRAX Risk Assessment  ICSI Preventive Guidelines  Dietary Guidelines for Americans, 2010  USDA's MyPlate  ASA Prophylaxis  Lung CA Screening    CIPRIANO Perez CNP  [facility]  "

## 2018-03-22 NOTE — PROGRESS NOTES
Hesham Graham,    Thank you for your recent office visit.    Here are your recent results.  Your MRI shows- 1)   L5-S1 mild or early degenerative disc disease (which occurs with aging)  2) left lateral recess 0.5 cm caudally dissecting disc herniation contacting  but not displacing the left S1 nerve root  3) moderate right L5 foraminal stenosis.    I am placing a referral for spine to discuss these results and potential treatment options.  Please schedule with them. Follow up as needed.     Feel free to contact me via [employer] or call the clinic at [phone].    Sincerely,    Diamond Keys, CIPRIANO, FNP-BC

## 2018-03-22 NOTE — TELEPHONE ENCOUNTER
Entered by Diamond Keys NP at 3/22/2018  1:15 PM   Read by Santos Bagley at 3/22/2018  2:52 PM   Hi Santos,     Thank you for your recent office visit.     Here are your recent results.  Your MRI shows- 1)   L5-S1 mild or early degenerative disc disease (which occurs with aging)   2) left lateral recess 0.5 cm caudally dissecting disc herniation contacting   but not displacing the left S1 nerve root   3) moderate right L5 foraminal stenosis.     I am placing a referral for spine to discuss these results and potential treatment options.  Please schedule with them. Follow up as needed.     Feel free to contact me via [employer] or call the clinic at [phone].     Sincerely,     CIPRIANO Wen, FNP-BC

## 2018-03-22 NOTE — MR AVS SNAPSHOT
After Visit Summary   3/22/2018    Santos Bagley    MRN: [id number]           Patient Information     Date Of Birth          1985        Visit Information        Provider Department      3/22/2018 9:20 AM Gina Maldonado APRN [facility]        Today's Diagnoses     Encounter for routine adult physical exam with abnormal findings    -  1    Screening for malignant neoplasm of cervix        Need for prophylactic vaccination with tetanus-diphtheria (TD)        Class 2 obesity due to excess calories without serious comorbidity with body mass index (BMI) of 35.0 to 35.9 in adult          Care Instructions    Cut out sodas, creamer and sugar.  Download My Fitness Pal Sanjay.  Follow-up in 1 month to discuss weight.    [facility]    If you have any questions regarding to your visit please contact your care team:     Team Pink:   Clinic Hours Telephone Number   Internal Medicine:  Dr. Tierra Maldonado, NP       7am-7pm  Monday - Thursday   7am-5pm  Fridays  [phone]  (Appointment scheduling available 24/7)    Questions about your visit?  Team Line  [phone]   Urgent Care - [place] [facility] - 11am-9pm Monday-Friday Saturday-Sunday- 9am-5pm   [place] - 5pm-9pm Monday-Friday Saturday-Sunday- 9am-5pm  [phone] - [place]   [phone] - [place]       What options do I have for visits at the clinic other than the traditional office visit?  To expand how we care for you, many of our providers are utilizing electronic visits (e-visits) and telephone visits, when medically appropriate, for interactions with their patients rather than a visit in the clinic.   We also offer nurse visits for many medical concerns. Just like any other service, we will bill your insurance company for this type of visit based on time spent on the phone with your provider. Not all insurance companies cover these visits. Please  check with your medical insurance if this type of visit is covered. You will be responsible for any charges that are not paid by your insurance.      E-visits via [employer]:  generally incur a $35.00 fee.  Telephone visits:  Time spent on the phone: *charged based on time that is spent on the phone in increments of 10 minutes. Estimated cost:   5-10 mins $30.00   11-20 mins. $59.00   21-30 mins. $85.00   Use Lessno (secure email communication and access to your chart) to send your primary care provider a message or make an appointment. Ask someone on your Team how to sign up for Lessno.    For a Price Quote for your services, please call our SocialVolt Line at [phone].    As always, Thank you for trusting us with your health care needs!    Discharged By:  TEX SANTOS            Follow-ups after your visit        Your next 10 appointments already scheduled     Mar 22, 2018 11:00 AM CDT   (Arrive by 10:45 AM)   MR LUMBAR SPINE W/O CONTRAST with [id number]   [facility] ([facility]    [address] [address] [phone]           Take your medicines as usual, unless your doctor tells you not to. Bring a list of your current medicines to your exam (including vitamins, minerals and over-the-counter drugs). Also bring the results of similar scans you may have had.  Please remove any body piercings and hair extensions before you arrive.  Follow your doctor s orders. If you do not, we may have to postpone your exam.  You may or may not receive IV contrast for this exam pending the discretion of the Radiologist.  You do not need to do anything special to prepare.  The MRI machine uses a strong magnet. Please wear clothes without metal (snaps, zippers). A sweatsuit works well, or we may give you a hospital gown.   **IMPORTANT** THE INSTRUCTIONS BELOW ARE ONLY FOR THOSE PATIENTS WHO HAVE BEEN PRESCRIBED SEDATION OR GENERAL ANESTHESIA DURING THEIR MRI PROCEDURE:  IF YOUR  DOCTOR PRESCRIBED ORAL SEDATION (take medicine to help you relax during your exam):   You must get the medicine from your doctor (oral medication) before you arrive. Bring the medicine to the exam. Do not take it at home. You ll be told when to take it upon arriving for your exam.   Arrive one hour early. Bring someone who can take you home after the test. Your medicine will make you sleepy. After the exam, you may not drive, take a bus or take a taxi by yourself.  IF YOUR DOCTOR PRESCRIBED IV SEDATION:   Arrive one hour early. Bring someone who can take you home after the test. Your medicine will make you sleepy. After the exam, you may not drive, take a bus or take a taxi by yourself.   No eating 6 hours before your exam. You may have clear liquids up until 4 hours before your exam. (Clear liquids include water, clear tea, black coffee and fruit juice without pulp.)  IF YOUR DOCTOR PRESCRIBED ANESTHESIA (be asleep for your exam):   Arrive 1 1/2 hours early. Bring someone who can take you home after the test. You may not drive, take a bus or take a taxi by yourself.   No eating 8 hours before your exam. You may have clear liquids up until 4 hours before your exam. (Clear liquids include water, clear tea, black coffee and fruit juice without pulp.)   You will spend four to five hours in the recovery room.  Please call the Imaging Department at your exam site with any questions.              Who to contact     If you have questions or need follow up information about today's clinic visit or your schedule please contact [facility] directly at [phone].  Normal or non-critical lab and imaging results will be communicated to you by MyChart, letter or phone within 4 business days after the clinic has received the results. If you do not hear from us within 7 days, please contact the clinic through MyChart or phone. If you have a critical or abnormal lab result, we will notify you by phone as soon as  "possible.  Submit refill requests through MyCheck or call your pharmacy and they will forward the refill request to us. Please allow 3 business days for your refill to be completed.          Additional Information About Your Visit        [employer] Information     MyCheck gives you secure access to your electronic health record. If you see a primary care provider, you can also send messages to your care team and make appointments. If you have questions, please call your primary care clinic.  If you do not have a primary care provider, please call [phone] and they will assist you.        Care EveryWhere ID     This is your Care EveryWhere ID. This could be used by other organizations to access your [facility] medical records  [id number]        Your Vitals Were     Pulse Temperature Respirations Height Last Period Pulse Oximetry    88 97.1  F (36.2  C) (Oral) 16 5' 5\" (1.651 m) 02/09/2018 96%    BMI (Body Mass Index)                   36.78 kg/m2            Blood Pressure from Last 3 Encounters:   03/22/18 104/72   03/20/18 124/85   01/15/18 120/80    Weight from Last 3 Encounters:   03/22/18 221 lb (100.2 kg)   03/20/18 223 lb 12.8 oz (101.5 kg)   01/15/18 215 lb 6.4 oz (97.7 kg)              We Performed the Following     Glucose     HPV High Risk Types DNA Cervical     Pap imaged thin layer screen with HPV - recommended age 30 - 65 years (select HPV order below)     TDAP VACCINE (ADACEL)     TSH with free T4 reflex        Primary Care Provider Office Phone # Fax #    Gina Nicky Maldonado, APRN [phone]       [address] [address]        Equal Access to Services     NI NICOLE : Hadii adrianne enciso Soilana, waaxda luqadaha, qaybta kaalmada altagracia brown. So [facility] [phone].    ATENCIÓN: Si habla español, tiene a saxena disposición servicios gratuitos de asistencia lingüística. Llame al [phone].    We comply with applicable " federal civil rights laws and [place] laws. We do not discriminate on the basis of race, color, national origin, age, disability, sex, sexual orientation, or gender identity.            Thank you!     Thank you for choosing [facility]  for your care. Our goal is always to provide you with excellent care. Hearing back from our patients is one way we can continue to improve our services. Please take a few minutes to complete the written survey that you may receive in the mail after your visit with us. Thank you!             Your Updated Medication List - Protect others around you: Learn how to safely use, store and throw away your medicines at www.disposemymeds.org.          This list is accurate as of 3/22/18 10:10 AM.  Always use your most recent med list.                   Brand Name Dispense Instructions for use Diagnosis    diclofenac 50 MG EC tablet    VOLTAREN    90 tablet    Take 1 tablet (50 mg) by mouth 3 times daily as needed for moderate pain (take with food)    Lumbar pain with radiation down left leg       lidocaine 5 % Patch    LIDODERM    30 patch    Apply up to 3 patches to painful area at once for up to 12 h within a 24 h period.  Remove after 12 hours.    Lumbar pain with radiation down left leg       methocarbamol 750 MG tablet    ROBAXIN    60 tablet    Take 1 tablet (750 mg) by mouth 3 times daily as needed for muscle spasms    Lumbar pain with radiation down left leg       NAPROXEN PO

## 2018-03-22 NOTE — TELEPHONE ENCOUNTER
Called patient, unable to leave message-no vm. Nany Reyes MA   Mychart sent as well. Nany Reyes MA       Please see what questions the patient has. The labs results are not back yet. Nany Reyes MA

## 2018-03-22 NOTE — NURSING NOTE
Screening Questionnaire for Adult Immunization    Are you sick today?   No   Do you have allergies to medications, food, a vaccine component or latex?   No   Have you ever had a serious reaction after receiving a vaccination?   No   Do you have a long-term health problem with heart disease, lung disease, asthma, kidney disease, metabolic disease (e.g. diabetes), anemia, or other blood disorder?   No   Do you have cancer, leukemia, HIV/AIDS, or any other immune system problem?   No   In the past 3 months, have you taken medications that affect  your immune system, such as prednisone, other steroids, or anticancer drugs; drugs for the treatment of rheumatoid arthritis, Crohn s disease, or psoriasis; or have you had radiation treatments?   No   Have you had a seizure, or a brain or other nervous system problem?   No   During the past year, have you received a transfusion of blood or blood     products, or been given immune (gamma) globulin or antiviral drug?   No   For women: Are you pregnant or is there a chance you could become        pregnant during the next month?   No   Have you received any vaccinations in the past 4 weeks?   No     Immunization questionnaire answers were all negative.        Per orders of Dr. Gina Maldonado, injection of Tdap given by Tanya Ren. Patient instructed to remain in clinic for 15 minutes afterwards, and to report any adverse reaction to me immediately.       Screening performed by Tanya Ren on 3/22/2018 at 11:54 AM.

## 2018-03-22 NOTE — PATIENT INSTRUCTIONS
Cut out sodas, creamer and sugar.  Download My Fitness Pal Sanjay.  Follow-up in 1 month to discuss weight.    [facility]    If you have any questions regarding to your visit please contact your care team:     Team Pink:   Clinic Hours Telephone Number   Internal Medicine:  Dr. Tierra Maldonado, NP       7am-7pm  Monday - Thursday   7am-5pm  Fridays  [phone]  (Appointment scheduling available 24/7)    Questions about your visit?  Team Line  [phone]   Urgent Care - [place] - 11am-9pm Monday-Friday Saturday-Sunday- 9am-5pm   [place] - 5pm-9pm Monday-Friday Saturday-Sunday- 9am-5pm  [phone] - [place]   [phone] - [place]       What options do I have for visits at the clinic other than the traditional office visit?  To expand how we care for you, many of our providers are utilizing electronic visits (e-visits) and telephone visits, when medically appropriate, for interactions with their patients rather than a visit in the clinic.   We also offer nurse visits for many medical concerns. Just like any other service, we will bill your insurance company for this type of visit based on time spent on the phone with your provider. Not all insurance companies cover these visits. Please check with your medical insurance if this type of visit is covered. You will be responsible for any charges that are not paid by your insurance.      E-visits via Brightblue:  generally incur a $35.00 fee.  Telephone visits:  Time spent on the phone: *charged based on time that is spent on the phone in increments of 10 minutes. Estimated cost:   5-10 mins $30.00   11-20 mins. $59.00   21-30 mins. $85.00   Use [employer] (secure email communication and access to your chart) to send your primary care provider a message or make an appointment. Ask someone on your Team how to sign up for Brightblue.    For a Price Quote for your services, please call our  Consumer V[employer] Line at [phone].    As always, Thank you for trusting us with your health care needs!    Discharged By:  TEX SANTOS

## 2018-03-22 NOTE — LETTER
My Depression Action Plan  Name: Santos Bagley   Date of Birth 1985  Date: 3/22/2018    My doctor: Gina Maldonado   My clinic: [address]  [place] [address]  [phone]          GREEN    ZONE   Good Control    What it looks like:     Things are going generally well. You have normal up s and down s. You may even feel depressed from time to time, but bad moods usually last less than a day.   What you need to do:  1. Continue to care for yourself (see self care plan)  2. Check your depression survival kit and update it as needed  3. Follow your physician s recommendations including any medication.  4. Do not stop taking medication unless you consult with your physician first.           YELLOW         ZONE Getting Worse    What it looks like:     Depression is starting to interfere with your life.     It may be hard to get out of bed; you may be starting to isolate yourself from others.    Symptoms of depression are starting to last most all day and this has happened for several days.     You may have suicidal thoughts but they are not constant.   What you need to do:     1. Call your care team, your response to treatment will improve if you keep your care team informed of your progress. Yellow periods are signs an adjustment may need to be made.     2. Continue your self-care, even if you have to fake it!    3. Talk to someone in your support network    4. Open up your depression survival kit           RED    ZONE Medical Alert - Get Help    What it looks like:     Depression is seriously interfering with your life.     You may experience these or other symptoms: You can t get out of bed most days, can t work or engage in other necessary activities, you have trouble taking care of basic hygiene, or basic responsibilities, thoughts of suicide or death that will not go away, self-injurious behavior.     What you need to do:  1. Call your care team and  request a same-day appointment. If they are not available (weekends or after hours) call your local crisis line, emergency room or 911.            Depression Self Care Plan / Survival Kit    Self-Care for Depression  Here s the deal. Your body and mind are really not as separate as most people think.  What you do and think affects how you feel and how you feel influences what you do and think. This means if you do things that people who feel good do, it will help you feel better.  Sometimes this is all it takes.  There is also a place for medication and therapy depending on how severe your depression is, so be sure to consult with your medical provider and/ or Behavioral Health Consultant if your symptoms are worsening or not improving.     In order to better manage my stress, I will:    Exercise  Get some form of exercise, every day. This will help reduce pain and release endorphins, the  feel good  chemicals in your brain. This is almost as good as taking antidepressants!  This is not the same as joining a gym and then never going! (they count on that by the way ) It can be as simple as just going for a walk or doing some gardening, anything that will get you moving.      Hygiene   Maintain good hygiene (Get out of bed in the morning, Make your bed, Brush your teeth, Take a shower, and Get dressed like you were going to work, even if you are unemployed).  If your clothes don't fit try to get ones that do.    Diet  I will strive to eat foods that are good for me, drink plenty of water, and avoid excessive sugar, caffeine, alcohol, and other mood-altering substances.  Some foods that are helpful in depression are: complex carbohydrates, B vitamins, flaxseed, fish or fish oil, fresh fruits and vegetables.    Psychotherapy  I agree to participate in Individual Therapy (if recommended).    Medication  If prescribed medications, I agree to take them.  Missing doses can result in serious side effects.  I understand that  drinking alcohol, or other illicit drug use, may cause potential side effects.  I will not stop my medication abruptly without first discussing it with my provider.    Staying Connected With Others  I will stay in touch with my friends, family members, and my primary care provider/team.    Use your imagination  Be creative.  We all have a creative side; it doesn t matter if it s oil painting, sand castles, or mud pies! This will also kick up the endorphins.    Witness Beauty  (AKA stop and smell the roses) Take a look outside, even in mid-winter. Notice colors, textures. Watch the squirrels and birds.     Service to others  Be of service to others.  There is always someone else in need.  By helping others we can  get out of ourselves  and remember the really important things.  This also provides opportunities for practicing all the other parts of the program.    Humor  Laugh and be silly!  Adjust your TV habits for less news and crime-drama and more comedy.    Control your stress  Try breathing deep, massage therapy, biofeedback, and meditation. Find time to relax each day.     My support system    Clinic Contact:  Phone number:    Contact 1:  Phone number:    Contact 2:  Phone number:    Taoism/:  Phone number:    Therapist:  Phone number:    Local crisis center:    Phone number:    Other community support:  Phone number:

## 2018-03-22 NOTE — TELEPHONE ENCOUNTER
Patient returned call and said you called her home phone and you should be calling her at [phone].  States she actually would like the results of her MRI.  Please call again.

## 2018-03-23 PROBLEM — R73.01 IMPAIRED FASTING GLUCOSE: Status: ACTIVE | Noted: 2018-03-23

## 2018-03-23 LAB
COPATH REPORT: NORMAL
PAP: NORMAL

## 2018-03-23 ASSESSMENT — PATIENT HEALTH QUESTIONNAIRE - PHQ9: SUM OF ALL RESPONSES TO PHQ QUESTIONS 1-9: 2

## 2018-03-23 NOTE — PROGRESS NOTES
"Dear Santos,    Your recent test results are attached.      Normal thyroid.  Your fasting glucose is elevated and puts you in the range of what's considered impaired fasting glucose or \"pre-diabetes\".  This increases your risk of developing Type 2 Diabetes in the future.  Normal glucose is considered less than 100 mg/dL and impaired fasting glucose ranges from 100-125 mg/dL.  Diet and exercise can help improve this number.  I would recommend trying to eat a well balanced diet with fruits, vegetables, whole grains, low fat dairy, and lean protein.  I would also recommend try to increase your physical activity to the point that you are exercising 30-60 minutes per day 5 or more days per week. Please contact clinic if you are interested in attending a pre-diabetes class.      If you have any questions please feel free to contact [phone] or myself via Package [employer].    Sincerely,  Gina Maldonado, CNP  "

## 2018-03-26 ENCOUNTER — OFFICE VISIT (OUTPATIENT)
Dept: ORTHOPEDICS | Facility: CLINIC | Age: 33
End: 2018-03-26
Payer: OTHER MISCELLANEOUS

## 2018-03-26 VITALS
SYSTOLIC BLOOD PRESSURE: 122 MMHG | BODY MASS INDEX: 41.72 KG/M2 | DIASTOLIC BLOOD PRESSURE: 80 MMHG | WEIGHT: 221 LBS | HEIGHT: 61 IN

## 2018-03-26 DIAGNOSIS — M54.16 LUMBAR RADICULOPATHY: Primary | ICD-10-CM

## 2018-03-26 DIAGNOSIS — M51.26 LUMBAR DISC HERNIATION: ICD-10-CM

## 2018-03-26 PROCEDURE — 99244 OFF/OP CNSLTJ NEW/EST MOD 40: CPT | Performed by: PEDIATRICS

## 2018-03-26 RX ORDER — PREDNISONE 20 MG/1
20 TABLET ORAL DAILY
Qty: 5 TABLET | Refills: 0 | Status: SHIPPED | OUTPATIENT
Start: 2018-03-26 | End: 2018-09-25

## 2018-03-26 NOTE — MR AVS SNAPSHOT
After Visit Summary   3/26/2018    Santos Bagley    MRN: [id number]           Patient Information     Date Of Birth          1985        Visit Information        Provider Department      3/26/2018 5:20 PM Anna Ventura MD [facility] [place]        Today's Diagnoses     Lumbar radiculopathy    -  1    Lumbar disc herniation          Care Instructions    Plan:  - Today's Plan of Care:  Prescription Medication as directed: Prednisone--stop Voltaren while taking prednisone  Work Restrictions  Rehab: Physical Therapy: [facility] - [phone]    -We also discussed other future treatment options:  Injection or referral    Follow Up: 1 month    If you have any further questions for your physician or physician s care team you can call [phone] and use option 3 to leave a voice message. Calls received during business hours will be returned same day.                Follow-ups after your visit        Additional Services     [facility] PT, HAND, AND CHIROPRACTIC REFERRAL       **This order will print in the [facility] Scheduling Office**    Physical Therapy, Hand Therapy and Chiropractic Care are available through:    *[facility]  *[facility]  *[facility]    Call one number to schedule at any of the above locations: [phone].    Your provider has referred you to: Physical Therapy at [facility] or [facility]    Indication/Reason for Referral: Low Back Pain  Onset of Illness: 12/20/17  Therapy Orders: Evaluate and Treat  Special Programs: None  Special Request: None     Additional Comments for the Therapist or Chiropractor: None    Please be aware that coverage of these services is subject to the terms and limitations of your health insurance plan.  Call member services at your health plan with any benefit or coverage questions.      Please bring the following to your appointment:    *Your personal calendar for scheduling  "future appointments  *Comfortable clothing                  Who to contact     If you have questions or need follow up information about today's clinic visit or your schedule please contact [facility] directly at [phone].  Normal or non-critical lab and imaging results will be communicated to you by MyChart, letter or phone within 4 business days after the clinic has received the results. If you do not hear from us within 7 days, please contact the clinic through [employer] or phone. If you have a critical or abnormal lab result, we will notify you by phone as soon as possible.  Submit refill requests through [employer] or call your pharmacy and they will forward the refill request to us. Please allow 3 business days for your refill to be completed.          Additional Information About Your Visit        [employer] Information     [employer] gives you secure access to your electronic health record. If you see a primary care provider, you can also send messages to your care team and make appointments. If you have questions, please call your primary care clinic.  If you do not have a primary care provider, please call [phone] and they will assist you.        Care EveryWhere ID     This is your Care EveryWhere ID. This could be used by other organizations to access your [place] medical records  [id number]        Your Vitals Were     Height BMI (Body Mass Index)                5' 0.5\" (1.537 m) 42.45 kg/m2           Blood Pressure from Last 3 Encounters:   03/26/18 122/80   03/22/18 104/72   03/20/18 124/85    Weight from Last 3 Encounters:   03/26/18 221 lb (100.2 kg)   03/22/18 221 lb (100.2 kg)   03/20/18 223 lb 12.8 oz (101.5 kg)              We Performed the Following     [facility] PT, HAND, AND CHIROPRACTIC REFERRAL          Today's Medication Changes          These changes are accurate as of 3/26/18  6:05 PM.  If you have any questions, ask your nurse or doctor.               Start " taking these medicines.        Dose/Directions    predniSONE 20 MG tablet   Commonly known as:  DELTASONE   Used for:  Lumbar radiculopathy, Lumbar disc herniation        Dose:  20 mg   Take 1 tablet (20 mg) by mouth daily   Quantity:  5 tablet   Refills:  0            Where to get your medicines      These medications were sent to [facility] [place] - [address] [address]  [address] [address]     Phone:  [phone]     predniSONE 20 MG tablet                Primary Care Provider Office Phone # Fax #    Gina Saunders Marty Nolan, APRN [facility] [phone] [address] [facility]  [facility] [address]        Equal Access to Services     CANDY NICOLE : Hadii adrianne valero hadasho Soomaali, waaxda luqadaha, qaybta kaalmada adeegyada, altagracia madera . So [facility] [phone].    ATENCIÓN: Si habla español, tiene a saxena disposición servicios gratuitos de asistencia lingüística. Llame al [phone].    We comply with applicable federal civil rights laws and [place] laws. We do not discriminate on the basis of race, color, national origin, age, disability, sex, sexual orientation, or gender identity.            Thank you!     Thank you for choosing [facility]  for your care. Our goal is always to provide you with excellent care. Hearing back from our patients is one way we can continue to improve our services. Please take a few minutes to complete the written survey that you may receive in the mail after your visit with us. Thank you!             Your Updated Medication List - Protect others around you: Learn how to safely use, store and throw away your medicines at www.disposemymeds.org.          This list is accurate as of 3/26/18  6:05 PM.  Always use your most recent med list.                   Brand Name Dispense Instructions for use Diagnosis    diclofenac 50 MG EC tablet    VOLTAREN    90 tablet    Take 1 tablet  (50 mg) by mouth 3 times daily as needed for moderate pain (take with food)    Lumbar pain with radiation down left leg       lidocaine 5 % Patch    LIDODERM    30 patch    Apply up to 3 patches to painful area at once for up to 12 h within a 24 h period.  Remove after 12 hours.    Lumbar pain with radiation down left leg       methocarbamol 750 MG tablet    ROBAXIN    60 tablet    Take 1 tablet (750 mg) by mouth 3 times daily as needed for muscle spasms    Lumbar pain with radiation down left leg       NAPROXEN PO           predniSONE 20 MG tablet    DELTASONE    5 tablet    Take 1 tablet (20 mg) by mouth daily    Lumbar radiculopathy, Lumbar disc herniation       TYLENOL PO      Take by mouth 3 times daily

## 2018-03-26 NOTE — LETTER
3/26/2018         RE: Santos Bagley  [address] [place] [id number]        Dear Colleague,    Thank you for referring your patient, Santos Bagley, to the [facility]. Please see a copy of my visit note below.    Sports Medicine Clinic Visit    PCP: Gina Maldonado    Santos Bagley is a 33 year old female who is seen  in consultation at the request of Diamond Keys N.P. presenting with low back pain.    Injury: Patient reports an injury ~ 3 months ago. Slipped on ice and fell at work.  Patient has tried medications (prednisone helped for a bit) and 1 week of chiropractic care along with medications.  Reports pain into her left leg, feels like cramping.    Santos was asked to complete the Oswestry Low Back Disability Index and Bebeto Start Back screening tool.  today in the office.  Disability score: 54%.     Location of Pain: left low back and posterior left leg to knee  Duration of Pain: 12/20/2017   Rating of Pain at worst: 10/10  Rating of Pain Currently: 7/10 with medication  Symptoms are better with: Tylenol and Other medications, topical analgesics  Symptoms are worse with: Walking, standing, sitting, bending/leaning forward, lifting  Additional Features:   Positive: cramping pain in posterior left leg to knee, weakness   Negative: swelling, bruising, popping, grinding, catching, locking, instability, paresthesias, numbness, pain in other joints and systemic symptoms  Other evaluation and/or treatments so far consists of: Tylenol and Other medications, topical analgesics, Chiropractic  Prior History of related problems: None    Social History: Dialysis tech    Review of Systems  Skin: no bruising, no swelling  Musculoskeletal: as above  Neurologic: no numbness, paresthesias  Remainder of review of systems is negative including constitutional, CV, pulmonary, GI, except as noted in HPI or medical history.    Patient's current problem list, past medical and surgical  "history, and family history were reviewed.    Patient Active Problem List   Diagnosis     Chronic tonsillitis     Hyperlipidemia LDL goal <160     Class 2 obesity due to excess calories without serious comorbidity with body mass index (BMI) of 35.0 to 35.9 in adult     Impaired fasting glucose     Past Medical History:   Diagnosis Date     Severe Cervical Dysplasia 7/02    LEEP, Needs annual pap smear screening     Past Surgical History:   Procedure Laterality Date     TUBAL LIGATION  2009     No family history on file.    Objective  /80 (BP Location: Right arm, Patient Position: Sitting, Cuff Size: Adult Large)  Ht 5' 0.5\" (1.537 m)  Wt 221 lb (100.2 kg)  BMI 42.45 kg/m2    GENERAL APPEARANCE: healthy, alert, no distress and over weight   GAIT: NORMAL  SKIN: no suspicious lesions or rashes  HEENT: Sclera clear, anicteric  CV: good peripheral pulses  RESP: Breathing not labored  NEURO: Normal strength and tone, mentation intact and speech normal  PSYCH:  mentation appears normal and affect normal/bright    Low back exam:    Inspection:     no visible deformity in the low back       normal skin       normal vascular       normal lymphatic       no asymmetry    Posture:      normal    Tender:     None currently    Non Tender:     remainder of lumbar spine    ROM:      limited flexion due to pain       limited extension due to pain    Strength:     hip flexion 5/5 bilateral       knee extension 5/5 bilateral       ankle dorsiflexion 5/5 bilateral       ankle plantarflexion 5/5 bilateral       dorsiflexion of the great toe 5/5 bilateral       able to heel and toe walk    Reflexes:     patellar (L3, L4) symmetric normal       achilles tendons (S1) symmetric normal    Sensation:    grossly intact throughout lower extremities    Special tests:      straight leg raise left positive        straight leg raise right negative       neg (-) TUTU  left       neg (-) FADIR  left      Radiology  I visualized and reviewed " these images with the patient  LUMBAR SPINE TWO-THREE VIEWS  1/15/2018 2:20 PM    HISTORY: Fall, subsequent encounter.  COMPARISON: None.  IMPRESSION: No fracture or malalignment. No evidence of arthritis.  Tubal ligation clips in the pelvis.       MR LUMBAR SPINE WITHOUT CONTRAST March 22, 2018 11:35 AM  HISTORY: Ongoing low back pain with left-sided sciatica since fall  three months ago/work injury. No improvement. Lumbar pain with  radiation down left leg.  TECHNIQUE: Sagittal T1 and T2, sagittal IR, and transverse proton  density and T2-weighted pulse sequences.  FINDINGS: Five lumbar vertebrae are assumed. Degenerative loss of disc  signal with normal disc height is present at L5-S1. Apophyseal joints  appear within normal limits with no periarticular marrow edema or  degenerative spondylolisthesis. Vertebral body heights are normal. The  conus medullaris is unremarkable in appearance on the sagittal images.  L2-L3, L3-L4, L4-L5: Normal.  L5-S1: Annular bulge or broad-based central disc protrusion with  superimposed small caudally dissecting extruded disc fragment within  the left lateral recess measuring approximately 0.5 cm in AP diameter.  This contacts but does not appear to displace the left S1 nerve root.  No central stenosis. Bilateral annular bulging results in moderate  right L5 foraminal stenosis. The left neural foramen appears to be  patent.  IMPRESSION: L5-S1 mild or early degenerative disc disease, left  lateral recess 0.5 cm caudally dissecting disc herniation contacting  but not displacing the left S1 nerve root, and moderate right L5  foraminal stenosis.    Assessment:  1. Lumbar radiculopathy    2. Lumbar disc herniation      We discussed the following treatment options: symptom treatment, activity modification/rest, injection, rehab and referral. Following discussion, plan: will start with physical therapy and trial another course of prednisone (I reviewed the mechanism of action as well as  risk/benefit profile of medications. Questions answered.)    Plan:  - Today's Plan of Care:  Prescription Medication as directed: Prednisone--stop Voltaren while taking prednisone  Work Restrictions  Rehab: Physical Therapy: [facility] - [phone]    -We also discussed other future treatment options:  Injection or referral    Follow Up: 1 month    Concerning signs and symptoms were reviewed.  The patient expressed understanding of this management plan and all questions were answered at this time.    Thanks for the opportunity to participate in the care of this patient, I will keep you updated on their progress.    CC: Diamond Ventura MD CAQ  Primary Care Sports Medicine  [facility]    Again, thank you for allowing me to participate in the care of your patient.        Sincerely,        Anna Ventura MD

## 2018-03-26 NOTE — PATIENT INSTRUCTIONS
Plan:  - Today's Plan of Care:  Prescription Medication as directed: Prednisone--stop Voltaren while taking prednisone  Work Restrictions  Rehab: Physical Therapy: [facility] - [phone]    -We also discussed other future treatment options:  Injection or referral    Follow Up: 1 month    If you have any further questions for your physician or physician s care team you can call [phone] and use option 3 to leave a voice message. Calls received during business hours will be returned same day.

## 2018-03-26 NOTE — PROGRESS NOTES
Sports Medicine Clinic Visit    PCP: Gina Maldonado    Santos Bagley is a 33 year old female who is seen  in consultation at the request of Diamond Keys N.P. presenting with low back pain.    Injury: Patient reports an injury ~ 3 months ago. Slipped on ice and fell at work.  Patient has tried medications (prednisone helped for a bit) and 1 week of chiropractic care along with medications.  Reports pain into her left leg, feels like cramping.    Santos was asked to complete the Oswestry Low Back Disability Index and Bebeto Start Back screening tool.  today in the office.  Disability score: 54%.     Location of Pain: left low back and posterior left leg to knee  Duration of Pain: 12/20/2017   Rating of Pain at worst: 10/10  Rating of Pain Currently: 7/10 with medication  Symptoms are better with: Tylenol and Other medications, topical analgesics  Symptoms are worse with: Walking, standing, sitting, bending/leaning forward, lifting  Additional Features:   Positive: cramping pain in posterior left leg to knee, weakness   Negative: swelling, bruising, popping, grinding, catching, locking, instability, paresthesias, numbness, pain in other joints and systemic symptoms  Other evaluation and/or treatments so far consists of: Tylenol and Other medications, topical analgesics, Chiropractic  Prior History of related problems: None    Social History: Dialysis tech    Review of Systems  Skin: no bruising, no swelling  Musculoskeletal: as above  Neurologic: no numbness, paresthesias  Remainder of review of systems is negative including constitutional, CV, pulmonary, GI, except as noted in HPI or medical history.    Patient's current problem list, past medical and surgical history, and family history were reviewed.    Patient Active Problem List   Diagnosis     Chronic tonsillitis     Hyperlipidemia LDL goal <160     Class 2 obesity due to excess calories without serious comorbidity with body mass index (BMI) of 35.0 to  "35.9 in adult     Impaired fasting glucose     Past Medical History:   Diagnosis Date     Severe Cervical Dysplasia 7/02    LEEP, Needs annual pap smear screening     Past Surgical History:   Procedure Laterality Date     TUBAL LIGATION  2009     No family history on file.    Objective  /80 (BP Location: Right arm, Patient Position: Sitting, Cuff Size: Adult Large)  Ht 5' 0.5\" (1.537 m)  Wt 221 lb (100.2 kg)  BMI 42.45 kg/m2    GENERAL APPEARANCE: healthy, alert, no distress and over weight   GAIT: NORMAL  SKIN: no suspicious lesions or rashes  HEENT: Sclera clear, anicteric  CV: good peripheral pulses  RESP: Breathing not labored  NEURO: Normal strength and tone, mentation intact and speech normal  PSYCH:  mentation appears normal and affect normal/bright    Low back exam:    Inspection:     no visible deformity in the low back       normal skin       normal vascular       normal lymphatic       no asymmetry    Posture:      normal    Tender:     None currently    Non Tender:     remainder of lumbar spine    ROM:      limited flexion due to pain       limited extension due to pain    Strength:     hip flexion 5/5 bilateral       knee extension 5/5 bilateral       ankle dorsiflexion 5/5 bilateral       ankle plantarflexion 5/5 bilateral       dorsiflexion of the great toe 5/5 bilateral       able to heel and toe walk    Reflexes:     patellar (L3, L4) symmetric normal       achilles tendons (S1) symmetric normal    Sensation:    grossly intact throughout lower extremities    Special tests:      straight leg raise left positive        straight leg raise right negative       neg (-) TUTU  left       neg (-) FADIR  left      Radiology  I visualized and reviewed these images with the patient  LUMBAR SPINE TWO-THREE VIEWS  1/15/2018 2:20 PM    HISTORY: Fall, subsequent encounter.  COMPARISON: None.  IMPRESSION: No fracture or malalignment. No evidence of arthritis.  Tubal ligation clips in the pelvis.       MR " LUMBAR SPINE WITHOUT CONTRAST March 22, 2018 11:35 AM  HISTORY: Ongoing low back pain with left-sided sciatica since fall  three months ago/work injury. No improvement. Lumbar pain with  radiation down left leg.  TECHNIQUE: Sagittal T1 and T2, sagittal IR, and transverse proton  density and T2-weighted pulse sequences.  FINDINGS: Five lumbar vertebrae are assumed. Degenerative loss of disc  signal with normal disc height is present at L5-S1. Apophyseal joints  appear within normal limits with no periarticular marrow edema or  degenerative spondylolisthesis. Vertebral body heights are normal. The  conus medullaris is unremarkable in appearance on the sagittal images.  L2-L3, L3-L4, L4-L5: Normal.  L5-S1: Annular bulge or broad-based central disc protrusion with  superimposed small caudally dissecting extruded disc fragment within  the left lateral recess measuring approximately 0.5 cm in AP diameter.  This contacts but does not appear to displace the left S1 nerve root.  No central stenosis. Bilateral annular bulging results in moderate  right L5 foraminal stenosis. The left neural foramen appears to be  patent.  IMPRESSION: L5-S1 mild or early degenerative disc disease, left  lateral recess 0.5 cm caudally dissecting disc herniation contacting  but not displacing the left S1 nerve root, and moderate right L5  foraminal stenosis.    Assessment:  1. Lumbar radiculopathy    2. Lumbar disc herniation      We discussed the following treatment options: symptom treatment, activity modification/rest, injection, rehab and referral. Following discussion, plan: will start with physical therapy and trial another course of prednisone (I reviewed the mechanism of action as well as risk/benefit profile of medications. Questions answered.)    Plan:  - Today's Plan of Care:  Prescription Medication as directed: Prednisone--stop Voltaren while taking prednisone  Work Restrictions  Rehab: Physical Therapy: [facility] - [phone]    -We also discussed other future treatment options:  Injection or referral    Follow Up: 1 month    Concerning signs and symptoms were reviewed.  The patient expressed understanding of this management plan and all questions were answered at this time.    Thanks for the opportunity to participate in the care of this patient, I will keep you updated on their progress.    CC: Diamond Ventura MD [facility]  Primary Care Sports Medicine  [facility]

## 2018-03-26 NOTE — LETTER
March 26, 2018      Santos Bagley  [address] [place] [phone]        To Whom It May Concern,    Santos is under my care for a low back injury. She may return to work tomorrow 3/27/2018 with the following restrictions:  1) Bend: Occasionally (1-3 hours)  2) Squat: Occasionally (1-3 hours)  3) Walk/Stand: Frequently (4-8 hours)  4) Reach Above Shoulders: Frequently (4-8 hours)  5) Lift, carry, push, and pull no more than:  11-20 lbs.Light duty-unable to lift more than 20 pounds.    She will start PT and follow up in 1 month.        Sincerely,        Anna Ventura MD

## 2018-03-27 PROBLEM — R87.810 CERVICAL HIGH RISK HPV (HUMAN PAPILLOMAVIRUS) TEST POSITIVE: Status: ACTIVE | Noted: 2018-03-22

## 2018-03-27 LAB
FINAL DIAGNOSIS: ABNORMAL
HPV HR 12 DNA CVX QL NAA+PROBE: POSITIVE
HPV16 DNA SPEC QL NAA+PROBE: NEGATIVE
HPV18 DNA SPEC QL NAA+PROBE: NEGATIVE
SPECIMEN DESCRIPTION: ABNORMAL
SPECIMEN SOURCE CVX/VAG CYTO: ABNORMAL

## 2018-03-29 ENCOUNTER — THERAPY VISIT (OUTPATIENT)
Dept: PHYSICAL THERAPY | Facility: CLINIC | Age: 33
End: 2018-03-29
Payer: OTHER MISCELLANEOUS

## 2018-03-29 DIAGNOSIS — M54.16 LUMBAR RADICULOPATHY: Primary | ICD-10-CM

## 2018-03-29 PROCEDURE — 97110 THERAPEUTIC EXERCISES: CPT | Mod: GP | Performed by: PHYSICAL THERAPIST

## 2018-03-29 PROCEDURE — 97162 PT EVAL MOD COMPLEX 30 MIN: CPT | Mod: GP | Performed by: PHYSICAL THERAPIST

## 2018-03-29 NOTE — MR AVS SNAPSHOT
After Visit Summary   3/29/2018    Santos Bagley    MRN: [id number]           Patient Information     Date Of Birth          1985        Visit Information        Provider Department      3/29/2018 9:20 AM Christiane Angel PT [facility]        Today's Diagnoses     Lumbar radiculopathy    -  1       Follow-ups after your visit        Who to contact     If you have questions or need follow up information about today's clinic visit or your schedule please contact [facility] directly at [phone].  Normal or non-critical lab and imaging results will be communicated to you by Ninghart, letter or phone within 4 business days after the clinic has received the results. If you do not hear from us within 7 days, please contact the clinic through A4 Datat or phone. If you have a critical or abnormal lab result, we will notify you by phone as soon as possible.  Submit refill requests through Appature or call your pharmacy and they will forward the refill request to us. Please allow 3 business days for your refill to be completed.          Additional Information About Your Visit        MyChart Information     Appature gives you secure access to your electronic health record. If you see a primary care provider, you can also send messages to your care team and make appointments. If you have questions, please call your primary care clinic.  If you do not have a primary care provider, please call [phone] and they will assist you.        Care EveryWhere ID     This is your Care EveryWhere ID. This could be used by other organizations to access your [place] medical records  [id number]         Blood Pressure from Last 3 Encounters:   03/26/18 122/80   03/22/18 104/72   03/20/18 124/85    Weight from Last 3 Encounters:   03/26/18 100.2 kg (221 lb)   03/22/18 100.2 kg (221 lb)   03/20/18 101.5 kg (223 lb 12.8 oz)              We Performed the Following      HC PT EVAL, MODERATE COMPLEXITY     EILEEN INITIAL EVAL REPORT     THERAPEUTIC EXERCISES        Primary Care Provider Office Phone # Fax #    CIPRIANO Cuevas [facility] [phone]       [address]  [place] [address]        Equal Access to Services     NI NICOLE : Hadii aad ku hadasho Soomaali, waaxda luqadaha, qaybta kaalmada adeegyada, waxay idiin hayaan adeeg khmau la'toriton . So [facility] [phone].    ATENCIÓN: Si habla español, tiene a saxena disposición servicios gratuitos de asistencia lingüística. Llame al [phone].    We comply with applicable federal civil rights laws and [place] laws. We do not discriminate on the basis of race, color, national origin, age, disability, sex, sexual orientation, or gender identity.            Thank you!     Thank you for choosing [facility]  for your care. Our goal is always to provide you with excellent care. Hearing back from our patients is one way we can continue to improve our services. Please take a few minutes to complete the written survey that you may receive in the mail after your visit with us. Thank you!             Your Updated Medication List - Protect others around you: Learn how to safely use, store and throw away your medicines at www.disposemymeds.org.          This list is accurate as of 3/29/18  9:58 AM.  Always use your most recent med list.                   Brand Name Dispense Instructions for use Diagnosis    diclofenac 50 MG EC tablet    VOLTAREN    90 tablet    Take 1 tablet (50 mg) by mouth 3 times daily as needed for moderate pain (take with food)    Lumbar pain with radiation down left leg       lidocaine 5 % Patch    LIDODERM    30 patch    Apply up to 3 patches to painful area at once for up to 12 h within a 24 h period.  Remove after 12 hours.    Lumbar pain with radiation down left leg       methocarbamol 750 MG tablet    ROBAXIN    60 tablet    Take 1 tablet (750 mg) by  mouth 3 times daily as needed for muscle spasms    Lumbar pain with radiation down left leg       NAPROXEN PO           predniSONE 20 MG tablet    DELTASONE    5 tablet    Take 1 tablet (20 mg) by mouth daily    Lumbar radiculopathy, Lumbar disc herniation       TYLENOL PO      Take by mouth 3 times daily

## 2018-03-29 NOTE — PROGRESS NOTES
[facility] Initial Evaluation  Subjective:  HPI                  Santos Bagley is a 33 year old female referred to PT for evaluation and treatment of low back pain. MD referral dated 3/26/18. Primary symptom location is lateral thigh with radiation to the knee. The pain is described as cramping, soreness, numbness and is intermittent. Patient reports change in pain with cough/sneeze, but denies any other red flags including saddle anaesthesia, severe night pain, peripheral motor deficit, recent bowel/bladder change, recent vision change, ringing in the ears or pain with swallowing. Patient states that symptoms began on December 22, 2017 following an injury at work where she slipped on ice and fell forward . Since onset, pain has been staying the same or gradually decreasing. Aggravating activities include bending forward, twisting, going from sitting to standing, with pain at worse getting to a 7/10 in the past week. Relieving activities include prednisone, muscle relaxer, tylenol, lidocaine, with pain at best a 0/10. Current pain rating is 5/10.    MRI from 3/22/18: IMPRESSION: L5-S1 mild or early degenerative disc disease, left  lateral recess 0.5 cm caudally dissecting disc herniation contacting  but not displacing the left S1 nerve root, and moderate right L5  foraminal stenosis.    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 toe ext) 4/5 4+/5   S1 (knee flex) 4/5 5/5     Sensory Deficit, Reflexes:    Light touch: L2-S1 equal bilaterally   Patellar Reflex: 1+ bilaterally   Achilles Reflex: 1+ bilaterally    Dural Signs:   L R   Slump +, back pain -   SLR - -       AROM: (Major, Moderate, Minimal or Nil loss)  Movement Loss Bong Mod Min Nil Pain   Flexion  x   x   Extension  x   x   Side Gliding L   x     Side Gliding R   x       Repeated Motions Testing: Prone  Prone Lying: pain free  Prone on Elbows: pain free  Prone Press-ups: back and leg pain during, pain free after with improved lumbar extension ROM    Assessment/Plan:    Patient is a 33 year old female with lumbar complaints.    Patient has the following significant findings with corresponding treatment plan.                Diagnosis 1:  Lumbar radiculopathy  Pain -  hot/cold therapy, electric stimulation, mechanical traction, manual therapy, self management, education, directional preference exercise and home program  Decreased ROM/flexibility - manual therapy, therapeutic exercise, therapeutic activity and home program  Decreased strength - therapeutic exercise, therapeutic activities, home program and neuromuscular re-education    Therapy Evaluation Codes:   1) History comprised of:   Personal factors that impact the plan of care:      Profession and Time since onset of symptoms.    Comorbidity factors that impact the plan of care are:      Overweight.     Medications impacting care: Anti-inflammatory, Muscle relaxant and Steroids.  2) Examination of Body Systems comprised of:   Body structures and functions that impact the plan of care:      Lumbar spine and left leg.   Activity limitations that impact the plan of care are:      Bending, Lifting, Sitting and Sleeping.  3) Clinical presentation characteristics are:   Evolving/Changing.  4) Decision-Making    Moderate  complexity using standardized patient assessment instrument and/or measureable assessment of functional outcome.  Cumulative Therapy Evaluation is: Moderate complexity.    Previous and current functional limitations:  (See Goal Flow Sheet for this information)    Short term and Long term goals: (See Goal Flow Sheet for this information)     Communication ability:  Patient appears to be able to clearly communicate and understand verbal and written communication and follow directions correctly.  Treatment Explanation - The following has been discussed with the patient:   RX ordered/plan of care  Anticipated outcomes  Possible risks and side effects  This patient would benefit from PT intervention to resume normal activities.   Rehab potential is good.    Frequency:  2 X week, once daily, tapering to 1 x week, once daily  Duration:  for 8 visits  Discharge Plan:  Achieve all LTG.  Independent in home treatment program.  Reach maximal therapeutic benefit.    Please refer to the daily flowsheet for treatment today, total treatment time and time spent performing 1:1 timed codes.

## 2018-04-05 ENCOUNTER — THERAPY VISIT (OUTPATIENT)
Dept: PHYSICAL THERAPY | Facility: CLINIC | Age: 33
End: 2018-04-05
Payer: OTHER MISCELLANEOUS

## 2018-04-05 DIAGNOSIS — M54.16 LUMBAR RADICULOPATHY: ICD-10-CM

## 2018-04-05 PROCEDURE — 97140 MANUAL THERAPY 1/> REGIONS: CPT | Mod: GP | Performed by: PHYSICAL THERAPIST

## 2018-04-05 PROCEDURE — 97110 THERAPEUTIC EXERCISES: CPT | Mod: GP | Performed by: PHYSICAL THERAPIST

## 2018-04-10 ENCOUNTER — THERAPY VISIT (OUTPATIENT)
Dept: PHYSICAL THERAPY | Facility: CLINIC | Age: 33
End: 2018-04-10
Payer: OTHER MISCELLANEOUS

## 2018-04-10 DIAGNOSIS — M54.16 LUMBAR RADICULOPATHY: ICD-10-CM

## 2018-04-10 PROCEDURE — 97110 THERAPEUTIC EXERCISES: CPT | Mod: GP | Performed by: PHYSICAL THERAPIST

## 2018-04-10 PROCEDURE — 97112 NEUROMUSCULAR REEDUCATION: CPT | Mod: GP | Performed by: PHYSICAL THERAPIST

## 2018-04-10 PROCEDURE — 97530 THERAPEUTIC ACTIVITIES: CPT | Mod: GP | Performed by: PHYSICAL THERAPIST

## 2018-04-10 NOTE — PROGRESS NOTES
"Subjective:  HPI                    Objective:  System    Physical Exam    General     ROS    Assessment/Plan:    SUBJECTIVE  Subjective: Pt reports she is getting better.  She notes that her leg pain isn't as bad as it used to be but is a little more sore at the back.  \"I'm a little worried because I'm not totally better.\"  Still difficult to bend forward but \"I'm more afraid to do it than anything else.\"  Still sore sitting but seems better with walking.   Current Pain level: 3/10   Changes in function:  Yes (See Goal flowsheet attached for changes in current functional level)     Adverse reaction to treatment or activity:  None    OBJECTIVE  Objective: Peripheralization with standing flexion, centralization with standing and prone extension.  5/5 distal MMT with exception of L hip flexion, which pt initially reported \"felt really weak\" but was able to sustain against resistance symmetrically in supine.     ASSESSMENT  Zoua continues to require intervention to meet STG and LTG's: PT  Patient's symptoms are resolving.  Response to therapy has shown an improvement in  radicular symptoms  Progress made towards STG/LTG?  Yes (See Goal flowsheet attached for updates on achievement of STG and LTG)    PLAN  Continue with extension.  Progress to flexion / return to function and core stabilization as able.    PTA/ATC plan:  N/A    Please refer to the daily flowsheet for treatment today, total treatment time and time spent performing 1:1 timed codes.          "

## 2018-04-10 NOTE — MR AVS SNAPSHOT
After Visit Summary   4/10/2018    Santos Bagley    MRN: [id number]           Patient Information     Date Of Birth          1985        Visit Information        Provider Department      4/10/2018 12:00 PM Ruben Trujillo, PT [facility] [place] [facility]        Today's Diagnoses     Lumbar radiculopathy           Follow-ups after your visit        Your next 10 appointments already scheduled     Apr 12, 2018 12:30 PM CDT   EILEEN Spine with Jennifer Mae PT   [facility] [place] [facility] ([facility] [place])    [address]  [place] [address]   [phone]            Apr 19, 2018 11:20 AM CDT   EILEEN Spine with Ruben Trujillo PT   [facility] [place] [facility] ([facility] [place])    [address]  [place] [address]   [phone]            Apr 26, 2018 11:20 AM CDT   EILEEN Spine with Ruben Trujillo PT   [facility] [place] [facility] ([facility] [place])    [address]  [address]  [place] [address]   [phone]            May 03, 2018  2:00 PM CDT   MEDSPINE RTN with Anna Ventura MD   [facility] [place] ([facility] [place])    [address]  [place] [address]   [phone]              Who to contact     If you have questions or need follow up information about today's clinic visit or your schedule please contact [facility] directly at [phone].  Normal or non-critical lab and imaging results will be communicated to you by MyChart, letter or phone within 4 business days after the clinic has received the results. If you do not hear from us within 7 days, please contact the clinic through MyChart or phone. If you have a critical or abnormal lab result, we will notify you by phone as soon as possible.  Submit refill requests through HighWire Press or call your pharmacy and they will forward the refill  request to us. Please allow 3 business days for your refill to be completed.          Additional Information About Your Visit        Loopsterhart Information     Loopsterhart gives you secure access to your electronic health record. If you see a primary care provider, you can also send messages to your care team and make appointments. If you have questions, please call your primary care clinic.  If you do not have a primary care provider, please call [phone] and they will assist you.        Care EveryWhere ID     This is your Care EveryWhere ID. This could be used by other organizations to access your [place] medical records  [id number]         Blood Pressure from Last 3 Encounters:   03/26/18 122/80   03/22/18 104/72   03/20/18 124/85    Weight from Last 3 Encounters:   03/26/18 100.2 kg (221 lb)   03/22/18 100.2 kg (221 lb)   03/20/18 101.5 kg (223 lb 12.8 oz)              We Performed the Following     Neuromuscular Re-Education     Therapeutic Activities     Therapeutic Exercises        Primary Care Provider Office Phone # Fax #    Gina CIPRIANO Villatoro [phone]       [address] [address]        Equal Access to Services     [facility] BONNIE : Hadii aad ku hadasho Soomaali, waaxda luqadaha, qaybta kaalmada adeegyada, waxay teresain haytoriton dhara madera . So [facility] [place] [phone].    ATENCIÓN: Si habla español, tiene a saxena disposición servicios gratuitos de asistencia lingüística. [facility] [phone].    We comply with applicable federal civil rights laws and [place] laws. We do not discriminate on the basis of race, color, national origin, age, disability, sex, sexual orientation, or gender identity.            Thank you!     Thank you for choosing [facility]  for your care. Our goal is always to provide you with excellent care. Hearing back from our patients is one way we can continue to improve our services. Please take a few minutes  to complete the written survey that you may receive in the mail after your visit with us. Thank you!             Your Updated Medication List - Protect others around you: Learn how to safely use, store and throw away your medicines at www.disposemymeds.org.          This list is accurate as of 4/10/18 12:57 PM.  Always use your most recent med list.                   Brand Name Dispense Instructions for use Diagnosis    diclofenac 50 MG EC tablet    VOLTAREN    90 tablet    Take 1 tablet (50 mg) by mouth 3 times daily as needed for moderate pain (take with food)    Lumbar pain with radiation down left leg       lidocaine 5 % Patch    LIDODERM    30 patch    Apply up to 3 patches to painful area at once for up to 12 h within a 24 h period.  Remove after 12 hours.    Lumbar pain with radiation down left leg       methocarbamol 750 MG tablet    ROBAXIN    60 tablet    Take 1 tablet (750 mg) by mouth 3 times daily as needed for muscle spasms    Lumbar pain with radiation down left leg       NAPROXEN PO           predniSONE 20 MG tablet    DELTASONE    5 tablet    Take 1 tablet (20 mg) by mouth daily    Lumbar radiculopathy, Lumbar disc herniation       TYLENOL PO      Take by mouth 3 times daily

## 2018-04-19 ENCOUNTER — THERAPY VISIT (OUTPATIENT)
Dept: PHYSICAL THERAPY | Facility: CLINIC | Age: 33
End: 2018-04-19
Payer: OTHER MISCELLANEOUS

## 2018-04-19 DIAGNOSIS — M54.16 LUMBAR RADICULOPATHY: ICD-10-CM

## 2018-04-19 PROCEDURE — 97110 THERAPEUTIC EXERCISES: CPT | Mod: GP | Performed by: PHYSICAL THERAPIST

## 2018-04-19 PROCEDURE — 97112 NEUROMUSCULAR REEDUCATION: CPT | Mod: GP | Performed by: PHYSICAL THERAPIST

## 2018-04-19 NOTE — MR AVS SNAPSHOT
After Visit Summary   4/19/2018    Santos Bagley    MRN: [id number]           Patient Information     Date Of Birth          1985        Visit Information        Provider Department      4/19/2018 11:20 AM Ruben Trujillo, PT [facility] [place] [facility]        Today's Diagnoses     Lumbar radiculopathy           Follow-ups after your visit        Your next 10 appointments already scheduled     Apr 26, 2018 11:20 AM CDT   EILEEN Spine with Ruben Trujillo PT   [facility] [place] [facility] ([facility] [place])    [address]  [address]  [place] [address]   [phone]            May 03, 2018  2:00 PM CDT   MEDSPINE RTN with Anna Ventura MD   [facility] [place] ([facility] [place])    [address]  [place] [address]   [phone]              Who to contact     If you have questions or need follow up information about today's clinic visit or your schedule please contact [facility] directly at [phone].  Normal or non-critical lab and imaging results will be communicated to you by Allworxhart, letter or phone within 4 business days after the clinic has received the results. If you do not hear from us within 7 days, please contact the clinic through Promoltat or phone. If you have a critical or abnormal lab result, we will notify you by phone as soon as possible.  Submit refill requests through Kevstel [employer] or call your pharmacy and they will forward the refill request to us. Please allow 3 business days for your refill to be completed.          Additional Information About Your Visit        MyChart Information     [employer] gives you secure access to your electronic health record. If you see a primary care provider, you can also send messages to your care team and make appointments. If you have questions, please call your primary care clinic.  If you do not have a primary care  provider, please call [phone] and they will assist you.        Care EveryWhere ID     This is your Care EveryWhere ID. This could be used by other organizations to access your [place] medical records  [id number]         Blood Pressure from Last 3 Encounters:   03/26/18 122/80   03/22/18 104/72   03/20/18 124/85    Weight from Last 3 Encounters:   03/26/18 100.2 kg (221 lb)   03/22/18 100.2 kg (221 lb)   03/20/18 101.5 kg (223 lb 12.8 oz)              We Performed the Following     Neuromuscular Re-Education     Therapeutic Exercises        Primary Care Provider Office Phone # Fax #    Gina Saunders CIPRIANO Maldonado [facility] [phone]       [address]  [place] [address]        Equal Access to Services     [facility] : Hadii aad ku hadasho Soomaali, waaxda luqadaha, qaybta kaalmada adeegyada, altagracia madera . So [facility] [phone].    ATENCIÓN: Si habla español, tiene a saxena disposición servicios gratuitos de asistencia lingüística. Rozina al [phone].    We comply with applicable federal civil rights laws and [place] laws. We do not discriminate on the basis of race, color, national origin, age, disability, sex, sexual orientation, or gender identity.            Thank you!     Thank you for choosing [facility]   for your care. Our goal is always to provide you with excellent care. Hearing back from our patients is one way we can continue to improve our services. Please take a few minutes to complete the written survey that you may receive in the mail after your visit with us. Thank you!             Your Updated Medication List - Protect others around you: Learn how to safely use, store and throw away your medicines at www.disposemymeds.org.          This list is accurate as of 4/19/18 12:09 PM.  Always use your most recent med list.                   Brand Name Dispense Instructions for use Diagnosis    diclofenac 50 MG EC tablet     VOLTAREN    90 tablet    Take 1 tablet (50 mg) by mouth 3 times daily as needed for moderate pain (take with food)    Lumbar pain with radiation down left leg       lidocaine 5 % Patch    LIDODERM    30 patch    Apply up to 3 patches to painful area at once for up to 12 h within a 24 h period.  Remove after 12 hours.    Lumbar pain with radiation down left leg       methocarbamol 750 MG tablet    ROBAXIN    60 tablet    Take 1 tablet (750 mg) by mouth 3 times daily as needed for muscle spasms    Lumbar pain with radiation down left leg       NAPROXEN PO           predniSONE 20 MG tablet    DELTASONE    5 tablet    Take 1 tablet (20 mg) by mouth daily    Lumbar radiculopathy, Lumbar disc herniation       TYLENOL PO      Take by mouth 3 times daily

## 2018-04-19 NOTE — PROGRESS NOTES
"Subjective:  HPI                    Objective:  System    Physical Exam    General     ROS    Assessment/Plan:    SUBJECTIVE  Subjective: Pt reports she has good days and bad days, today being a fairly good day.  Doesn't have the pain down the leg nearly as much and finds the HEP helpful in increasing motion.  Today feeling just a \"pinch\" in L buttock.  Overall sleeping much better.   Current Pain level: 3/10   Changes in function:  Yes (See Goal flowsheet attached for changes in current functional level)     Adverse reaction to treatment or activity:  None    OBJECTIVE  Objective: Peripheralization with standing flexion and extension but centralization with prone extension, including overpressure from pt and PT.     ASSESSMENT  Zou[facility] continues to require intervention to meet STG and LTG's: PT  Patient is progressing as expected.  Response to therapy has shown an improvement in  radicular symptoms  Progress made towards STG/LTG?  Yes (See Goal flowsheet attached for updates on achievement of STG and LTG)    PLAN  Current treatment program is being advanced to more complex exercises.  Appears responding to extension force progression.    PTA/ATC plan:  N/A    Please refer to the daily flowsheet for treatment today, total treatment time and time spent performing 1:1 timed codes.          "

## 2018-04-26 ENCOUNTER — THERAPY VISIT (OUTPATIENT)
Dept: PHYSICAL THERAPY | Facility: CLINIC | Age: 33
End: 2018-04-26
Payer: OTHER MISCELLANEOUS

## 2018-04-26 DIAGNOSIS — M54.16 LUMBAR RADICULOPATHY: ICD-10-CM

## 2018-04-26 PROCEDURE — 97530 THERAPEUTIC ACTIVITIES: CPT | Mod: GP | Performed by: PHYSICAL THERAPIST

## 2018-04-26 PROCEDURE — 97110 THERAPEUTIC EXERCISES: CPT | Mod: GP | Performed by: PHYSICAL THERAPIST

## 2018-04-26 NOTE — PROGRESS NOTES
"Subjective:  HPI                    Objective:  System    Physical Exam    General     ROS    Assessment/Plan:    PROGRESS  REPORT    Progress reporting period is from 03/29/2018 to today.       SUBJECTIVE  Subjective: Pt reports she continues to have ups and downs.  Seems like she typically has good days when she is coming to clinic but that yesterday and the day before were \"horrible.\"  Can be across the back and gets still some \"cramping\" to the L calf.    Current Pain level: 3/10.     Initial Pain level: 5/10.   Changes in function:  Yes (See Goal flowsheet attached for changes in current functional level)  Adverse reaction to treatment or activity: None    OBJECTIVE  Objective: No distal strength asymmetry.  Peripheralization with standing trunk extension but centralizes with prone extension.  TA MMT 1/5.     ASSESSMENT/PLAN  Updated problem list and treatment plan: Diagnosis 1:  LBP, leg pain -- see plan below  STG/LTGs have been met or progress has been made towards goals:  Yes (See Goal flow sheet completed today.)  Assessment of Progress: The patient's condition has potential to improve.  Self Management Plans:  Patient has been instructed in a home treatment program.  I have re-evaluated this patient and find that the nature, scope, duration and intensity of the therapy is appropriate for the medical condition of the patient.  Santos continues to require the following intervention to meet STG and LTG's:  PT    Recommendations:  Pt has overall made some progress during course of PT but continues to hidalgo pain and having ups and downs.  She will f/u with Dr Ventura as scheduled next week.  Anticipate continuation of PT unless advised otherwise.    Please refer to the daily flowsheet for treatment today, total treatment time and time spent performing 1:1 timed codes.            "

## 2018-04-26 NOTE — MR AVS SNAPSHOT
After Visit Summary   4/26/2018    Santos Bagley    MRN: [id number]           Patient Information     Date Of Birth          1985        Visit Information        Provider Department      4/26/2018 11:20 AM Ruben Trujillo PT [facility] [place] [facility]        Today's Diagnoses     Lumbar radiculopathy           Follow-ups after your visit        Your next 10 appointments already scheduled     May 03, 2018  3:20 PM CDT   VIOLETA RTN with Anna Ventura MD   [facility] [place] ([facility] [place])    [address]  [place] [id number] [phone]              Who to contact     If you have questions or need follow up information about today's clinic visit or your schedule please contact [facility] directly at [phone].  Normal or non-critical lab and imaging results will be communicated to you by MyChart, letter or phone within 4 business days after the clinic has received the results. If you do not hear from us within 7 days, please contact the clinic through [employer] or phone. If you have a critical or abnormal lab result, we will notify you by phone as soon as possible.  Submit refill requests through [employer] or call your pharmacy and they will forward the refill request to us. Please allow 3 business days for your refill to be completed.          Additional Information About Your Visit        MyChart Information     [employer] gives you secure access to your electronic health record. If you see a primary care provider, you can also send messages to your care team and make appointments. If you have questions, please call your primary care clinic.  If you do not have a primary care provider, please call [phone] and they will assist you.        Care EveryWhere ID     This is your Care EveryWhere ID. This could be used by other organizations to access your [facility]  records  [id number]         Blood Pressure from Last 3 Encounters:   03/26/18 122/80   03/22/18 104/72   03/20/18 124/85    Weight from Last 3 Encounters:   03/26/18 100.2 kg (221 lb)   03/22/18 100.2 kg (221 lb)   03/20/18 101.5 kg (223 lb 12.8 oz)              We Performed the Following     Therapeutic Activities     Therapeutic Exercises        Primary Care Provider Office Phone # Fax #    Gina Maldonado, CIPRIANO [facility] [phone]       [address]  [facility] [address]        Equal Access to Services     [facility]: Hadii aad ku hadasho Soomaali, waaxda luqadaha, qaybta kaalmada adeegyada, altagracia madera . So [facility] [phone].    ATENCIÓN: Si habla español, tiene a saxena disposición servicios gratuitos de asistencia lingüística. [facility] [phone].    We comply with applicable federal civil rights laws and [place] laws. We do not discriminate on the basis of race, color, national origin, age, disability, sex, sexual orientation, or gender identity.            Thank you!     Thank you for choosing [facility]   for your care. Our goal is always to provide you with excellent care. Hearing back from our patients is one way we can continue to improve our services. Please take a few minutes to complete the written survey that you may receive in the mail after your visit with us. Thank you!             Your Updated Medication List - Protect others around you: Learn how to safely use, store and throw away your medicines at www.disposemymeds.org.          This list is accurate as of 4/26/18 12:24 PM.  Always use your most recent med list.                   Brand Name Dispense Instructions for use Diagnosis    diclofenac 50 MG EC tablet    VOLTAREN    90 tablet    Take 1 tablet (50 mg) by mouth 3 times daily as needed for moderate pain (take with food)    Lumbar pain with radiation down left leg       lidocaine 5 % Patch    LIDODERM     30 patch    Apply up to 3 patches to painful area at once for up to 12 h within a 24 h period.  Remove after 12 hours.    Lumbar pain with radiation down left leg       methocarbamol 750 MG tablet    ROBAXIN    60 tablet    Take 1 tablet (750 mg) by mouth 3 times daily as needed for muscle spasms    Lumbar pain with radiation down left leg       NAPROXEN PO           predniSONE 20 MG tablet    DELTASONE    5 tablet    Take 1 tablet (20 mg) by mouth daily    Lumbar radiculopathy, Lumbar disc herniation       TYLENOL PO      Take by mouth 3 times daily

## 2018-05-03 ENCOUNTER — OFFICE VISIT (OUTPATIENT)
Dept: ORTHOPEDICS | Facility: CLINIC | Age: 33
End: 2018-05-03
Payer: OTHER MISCELLANEOUS

## 2018-05-03 VITALS
SYSTOLIC BLOOD PRESSURE: 112 MMHG | DIASTOLIC BLOOD PRESSURE: 68 MMHG | HEIGHT: 61 IN | WEIGHT: 223.3 LBS | BODY MASS INDEX: 42.16 KG/M2

## 2018-05-03 DIAGNOSIS — M51.26 LUMBAR DISC HERNIATION: ICD-10-CM

## 2018-05-03 DIAGNOSIS — M54.16 LUMBAR RADICULOPATHY: Primary | ICD-10-CM

## 2018-05-03 PROCEDURE — 99214 OFFICE O/P EST MOD 30 MIN: CPT | Performed by: PEDIATRICS

## 2018-05-03 NOTE — LETTER
May 3, 2018      Santos Bagley  [address] [place] [id number]        To Whom It May Concern,    Santos is under my care for a low back injury. She may return to work tomorrow 3/27/2018 with the following restrictions:  1) Bend: Occasionally (1-3 hours)  2) Squat: Occasionally (1-3 hours)  3) Walk/Stand: Frequently (4-8 hours)  4) Reach Above Shoulders: Frequently (4-8 hours)  5) Lift, carry, push, and pull no more than:  11-20 lbs.Light duty-unable to lift more than 20 pounds.     She will follow up in around 4-6 weeks.      Sincerely,        Anna Ventura MD

## 2018-05-03 NOTE — MR AVS SNAPSHOT
After Visit Summary   5/3/2018    Santos Bagley    MRN: [id number]           Patient Information     Date Of Birth          1985        Visit Information        Provider Department      5/3/2018 3:20 PM Anna Ventura MD [facility] [place]        Today's Diagnoses     Lumbar radiculopathy    -  1    Lumbar disc herniation          Care Instructions    Plan:  - Today's Plan of Care:  Referral to Pain Management  Work Restrictions    Follow Up: 2 weeks post injection    If you have any further questions for your physician or physician s care team you can call [phone] and use option 3 to leave a voice message. Calls received during business hours will be returned same day.            Follow-ups after your visit        Additional Services     PAIN MANAGEMENT REFERRAL       Your provider [place] [facility] [facility] -    Reason for Referral: Procedure Order Epidural:  Lumbar (Advanced imaging required in the last 3 years) -- TBD by pain specialist      What is your diagnosis for the patient's pain? Lumbar radiculopathy and disc herniation      For any questions, contact the [facility] at [phone].     **ANY DIAGNOSTIC TESTS THAT ARE NOT IN EPIC SHOULD BE SENT TO THE PAIN CENTER**    REGARDING OPIOID MEDICATIONS:  The discussion of opioids management, appropriateness of therapy, and dosing will be discussed in patients being seen for evaluation.  The pain management clinics are not long-term prescribing clinics, with transition of prescribing of medications ultimately going back to the referring provider/PCP.  If prescribing is taken over at the pain clinic, it is in actively involved patients whom are appropriate for opioids, urine drug screening is completed, and long-term prescribing plan has been determined.  Therefore, we will not be automatically taking over prescribing at the patient's first visit.  Is this  agreeable to you? agrees.     Please be aware that coverage of these services is subject to the terms and limitations of your health insurance plan.  Call member services at your health plan with any benefit or coverage questions.      Please bring the following with you to your appointment:    (1) Any X-Rays, CTs or MRIs which have been performed.  Contact the facility where they were done to arrange for  prior to your scheduled appointment.    (2) List of current medications   (3) This referral request   (4) Any documents/labs given to you for this referral                  Who to contact     If you have questions or need follow up information about today's clinic visit or your schedule please contact [facility] directly at [phone].  Normal or non-critical lab and imaging results will be communicated to you by [employer], letter or phone within 4 business days after the clinic has received the results. If you do not hear from us within 7 days, please contact the clinic through [employer] or phone. If you have a critical or abnormal lab result, we will notify you by phone as soon as possible.  Submit refill requests through [employer] or call your pharmacy and they will forward the refill request to us. Please allow 3 business days for your refill to be completed.          Additional Information About Your Visit        [employer] Information     [employer] gives you secure access to your electronic health record. If you see a primary care provider, you can also send messages to your care team and make appointments. If you have questions, please call your primary care clinic.  If you do not have a primary care provider, please call [phone] and they will assist you.        Care EveryWhere ID     This is your Care EveryWhere ID. This could be used by other organizations to access your [place] medical records  [id number]        Your Vitals Were     Height BMI (Body Mass Index)        "         5' 0.5\" (1.537 m) 42.89 kg/m2           Blood Pressure from Last 3 Encounters:   05/03/18 112/68   03/26/18 122/80   03/22/18 104/72    Weight from Last 3 Encounters:   05/03/18 223 lb 4.8 oz (101.3 kg)   03/26/18 221 lb (100.2 kg)   03/22/18 221 lb (100.2 kg)              We Performed the Following     PAIN MANAGEMENT REFERRAL        Primary Care Provider Office Phone # Fax #    Gina Ferguson Ovidio, APRN [phone] [address] [address]        Equal Access to Services     CANDY NICOLE : Hadii adrianne escobaro Sogloryali, waaxda luqadaha, qaybta kaalmada adeegyada, altagracia madera . So [facility] [phone].    ATENCIÓN: Si habla español, tiene a saxena disposición servicios gratuitos de asistencia lingüística. Llame al [phone].    We comply with applicable federal civil rights laws and [place] laws. We do not discriminate on the basis of race, color, national origin, age, disability, sex, sexual orientation, or gender identity.            Thank you!     Thank you for choosing [facility]  for your care. Our goal is always to provide you with excellent care. Hearing back from our patients is one way we can continue to improve our services. Please take a few minutes to complete the written survey that you may receive in the mail after your visit with us. Thank you!             Your Updated Medication List - Protect others around you: Learn how to safely use, store and throw away your medicines at www.disposemymeds.org.          This list is accurate as of 5/3/18  4:04 PM.  Always use your most recent med list.                   Brand Name Dispense Instructions for use Diagnosis    diclofenac 50 MG EC tablet    VOLTAREN    90 tablet    Take 1 tablet (50 mg) by mouth 3 times daily as needed for moderate pain (take with food)    Lumbar pain with radiation down left leg       IBUPROFEN PO      Take 800 mg by mouth " every 6 hours        lidocaine 5 % Patch    LIDODERM    30 patch    Apply up to 3 patches to painful area at once for up to 12 h within a 24 h period.  Remove after 12 hours.    Lumbar pain with radiation down left leg       methocarbamol 750 MG tablet    ROBAXIN    60 tablet    Take 1 tablet (750 mg) by mouth 3 times daily as needed for muscle spasms    Lumbar pain with radiation down left leg       NAPROXEN PO           predniSONE 20 MG tablet    DELTASONE    5 tablet    Take 1 tablet (20 mg) by mouth daily    Lumbar radiculopathy, Lumbar disc herniation       TYLENOL PO      Take by mouth 3 times daily

## 2018-05-03 NOTE — LETTER
"    5/3/2018         RE: Santos Bagley  [address] [place] [id number]        Dear Colleague,    Thank you for referring your patient, Santos Bagley, to the [facility]. Please see a copy of my visit note below.    Sports Medicine Clinic Visit - Interim History May 3, 2018    PCP: Gina Maldonado    Santos Bagley is a 33 year old female who is seen in f/u up for    Lumbar radiculopathy  Lumbar disc herniation.  Since last visit on 3/26/18 patient has been doing PT in clinic and at home and felt little to no change in symptoms. Average pain 5/10.    Initial Injury History 3/26/2018: Patient reports an injury ~ 3 months ago. Slipped on ice and fell at work.  Patient has tried medications (prednisone helped for a bit) and 1 week of chiropractic care along with medications.  Reports pain into her left leg, feels like cramping.    Symptoms are better with: Tylenol  Symptoms are worse with: walking, standing, sitting, bending/leaning forward, lifting  Additional Features:   Positive: \"cramping\" in left thigh to knee   Negative: swelling, bruising, popping, grinding, catching, locking, instability, paresthesias, numbness, weakness, pain in other joints and systemic symptoms    Social History: dialysis tech    Review of Systems  Skin: no bruising, no swelling  Musculoskeletal: as above  Neurologic: no numbness, paresthesias  Remainder of review of systems is negative including constitutional, CV, pulmonary, GI, except as noted in HPI or medical history.    Patient's current problem list, past medical and surgical history, and family history were reviewed.    Patient Active Problem List   Diagnosis     Chronic tonsillitis     Hyperlipidemia LDL goal <160     Class 2 obesity due to excess calories without serious comorbidity with body mass index (BMI) of 35.0 to 35.9 in adult     Impaired fasting glucose     Cervical high risk HPV (human papillomavirus) test positive     Lumbar " "radiculopathy     Past Medical History:   Diagnosis Date     Cervical high risk HPV (human papillomavirus) test positive 03/22/2018    See Problem List     Severe Cervical Dysplasia 7/02    LEEP, Needs annual pap smear screening     Past Surgical History:   Procedure Laterality Date     TUBAL LIGATION  2009     No family history on file.    Objective  /68 (BP Location: Right arm, Patient Position: Sitting, Cuff Size: Adult Large)  Ht 5' 0.5\" (1.537 m)  Wt 223 lb 4.8 oz (101.3 kg)  BMI 42.89 kg/m2    GENERAL APPEARANCE: healthy, alert and no distress   GAIT: NORMAL  SKIN: no suspicious lesions or rashes  HEENT: Sclera clear, anicteric  CV: good peripheral pulses  RESP: Breathing not labored  NEURO: Normal strength and tone, mentation intact and speech normal  PSYCH:  mentation appears normal and affect normal/bright    Low back exam:  Inspection:     no visible deformity in the low back       normal skin       normal vascular       normal lymphatic       no asymmetry     Posture:      normal     Tender:     None currently     Non Tender:     remainder of lumbar spine     ROM:      limited flexion due to pain       limited extension due to pain     Strength:     hip flexion 5/5 bilateral       knee extension 5/5 bilateral       ankle dorsiflexion 5/5 bilateral       ankle plantarflexion 5/5 bilateral       dorsiflexion of the great toe 5/5 bilateral       able to heel and toe walk     Reflexes:     patellar (L3, L4) symmetric normal       achilles tendons (S1) symmetric normal     Sensation:    grossly intact throughout lower extremities     Special tests:      straight leg raise and slump test left positive        straight leg raise right negative       neg (-) TUTU  left       neg (-) FADIR  left       Radiology  I visualized and reviewed these images with the patient  LUMBAR SPINE TWO-THREE VIEWS  1/15/2018 2:20 PM    HISTORY: Fall, subsequent encounter.  COMPARISON: None.  IMPRESSION: No fracture or " malalignment. No evidence of arthritis.  Tubal ligation clips in the pelvis.         MR LUMBAR SPINE WITHOUT CONTRAST March 22, 2018 11:35 AM  HISTORY: Ongoing low back pain with left-sided sciatica since fall  three months ago/work injury. No improvement. Lumbar pain with  radiation down left leg.  TECHNIQUE: Sagittal T1 and T2, sagittal IR, and transverse proton  density and T2-weighted pulse sequences.  FINDINGS: Five lumbar vertebrae are assumed. Degenerative loss of disc  signal with normal disc height is present at L5-S1. Apophyseal joints  appear within normal limits with no periarticular marrow edema or  degenerative spondylolisthesis. Vertebral body heights are normal. The  conus medullaris is unremarkable in appearance on the sagittal images.  L2-L3, L3-L4, L4-L5: Normal.  L5-S1: Annular bulge or broad-based central disc protrusion with  superimposed small caudally dissecting extruded disc fragment within  the left lateral recess measuring approximately 0.5 cm in AP diameter.  This contacts but does not appear to displace the left S1 nerve root.  No central stenosis. Bilateral annular bulging results in moderate  right L5 foraminal stenosis. The left neural foramen appears to be  patent.  IMPRESSION: L5-S1 mild or early degenerative disc disease, left  lateral recess 0.5 cm caudally dissecting disc herniation contacting  but not displacing the left S1 nerve root, and moderate right L5  foraminal stenosis.    Assessment:  1. Lumbar radiculopathy    2. Lumbar disc herniation      Given lack of improvement with prednisone and physical therapy, we discussed lumbar epidural steroid injection next step.  Continue physical therapy and follow-up 2 weeks postinjection.    Plan:  - Today's Plan of Care:  Referral to Pain Management  Work Restrictions    Follow Up: 2 weeks post injection    Concerning signs and symptoms were reviewed.  The patient expressed understanding of this management plan and all questions were  answered at this time.    Anna Ventura MD CAQ  Primary Care Sports Medicine  [facility]    Again, thank you for allowing me to participate in the care of your patient.        Sincerely,        Anna Ventura MD

## 2018-05-03 NOTE — PATIENT INSTRUCTIONS
Plan:  - Today's Plan of Care:  Referral to Pain Management  Work Restrictions    Follow Up: 2 weeks post injection    If you have any further questions for your physician or physician s care team you can call [phone] and use option 3 to leave a voice message. Calls received during business hours will be returned same day.

## 2018-05-03 NOTE — PROGRESS NOTES
"Sports Medicine Clinic Visit - Interim History May 3, 2018    PCP: Gina Maldonado    Santos Bagley is a 33 year old female who is seen in f/u up for    Lumbar radiculopathy  Lumbar disc herniation.  Since last visit on 3/26/18 patient has been doing PT in clinic and at home and felt little to no change in symptoms. Average pain 5/10.    Initial Injury History 3/26/2018: Patient reports an injury ~ 3 months ago. Slipped on ice and fell at work.  Patient has tried medications (prednisone helped for a bit) and 1 week of chiropractic care along with medications.  Reports pain into her left leg, feels like cramping.    Symptoms are better with: Tylenol  Symptoms are worse with: walking, standing, sitting, bending/leaning forward, lifting  Additional Features:   Positive: \"cramping\" in left thigh to knee   Negative: swelling, bruising, popping, grinding, catching, locking, instability, paresthesias, numbness, weakness, pain in other joints and systemic symptoms    Social History: dialysis tech    Review of Systems  Skin: no bruising, no swelling  Musculoskeletal: as above  Neurologic: no numbness, paresthesias  Remainder of review of systems is negative including constitutional, CV, pulmonary, GI, except as noted in HPI or medical history.    Patient's current problem list, past medical and surgical history, and family history were reviewed.    Patient Active Problem List   Diagnosis     Chronic tonsillitis     Hyperlipidemia LDL goal <160     Class 2 obesity due to excess calories without serious comorbidity with body mass index (BMI) of 35.0 to 35.9 in adult     Impaired fasting glucose     Cervical high risk HPV (human papillomavirus) test positive     Lumbar radiculopathy     Past Medical History:   Diagnosis Date     Cervical high risk HPV (human papillomavirus) test positive 03/22/2018    See Problem List     Severe Cervical Dysplasia 7/02    LEEP, Needs annual pap smear screening     Past Surgical History: " "  Procedure Laterality Date     TUBAL LIGATION  2009     No family history on file.    Objective  /68 (BP Location: Right arm, Patient Position: Sitting, Cuff Size: Adult Large)  Ht 5' 0.5\" (1.537 m)  Wt 223 lb 4.8 oz (101.3 kg)  BMI 42.89 kg/m2    GENERAL APPEARANCE: healthy, alert and no distress   GAIT: NORMAL  SKIN: no suspicious lesions or rashes  HEENT: Sclera clear, anicteric  CV: good peripheral pulses  RESP: Breathing not labored  NEURO: Normal strength and tone, mentation intact and speech normal  PSYCH:  mentation appears normal and affect normal/bright    Low back exam:  Inspection:     no visible deformity in the low back       normal skin       normal vascular       normal lymphatic       no asymmetry     Posture:      normal     Tender:     None currently     Non Tender:     remainder of lumbar spine     ROM:      limited flexion due to pain       limited extension due to pain     Strength:     hip flexion 5/5 bilateral       knee extension 5/5 bilateral       ankle dorsiflexion 5/5 bilateral       ankle plantarflexion 5/5 bilateral       dorsiflexion of the great toe 5/5 bilateral       able to heel and toe walk     Reflexes:     patellar (L3, L4) symmetric normal       achilles tendons (S1) symmetric normal     Sensation:    grossly intact throughout lower extremities     Special tests:      straight leg raise and slump test left positive        straight leg raise right negative       neg (-) TUTU  left       neg (-) FADIR  left       Radiology  I visualized and reviewed these images with the patient  LUMBAR SPINE TWO-THREE VIEWS  1/15/2018 2:20 PM    HISTORY: Fall, subsequent encounter.  COMPARISON: None.  IMPRESSION: No fracture or malalignment. No evidence of arthritis.  Tubal ligation clips in the pelvis.         MR LUMBAR SPINE WITHOUT CONTRAST March 22, 2018 11:35 AM  HISTORY: Ongoing low back pain with left-sided sciatica since fall  three months ago/work injury. No improvement. " Lumbar pain with  radiation down left leg.  TECHNIQUE: Sagittal T1 and T2, sagittal IR, and transverse proton  density and T2-weighted pulse sequences.  FINDINGS: Five lumbar vertebrae are assumed. Degenerative loss of disc  signal with normal disc height is present at L5-S1. Apophyseal joints  appear within normal limits with no periarticular marrow edema or  degenerative spondylolisthesis. Vertebral body heights are normal. The  conus medullaris is unremarkable in appearance on the sagittal images.  L2-L3, L3-L4, L4-L5: Normal.  L5-S1: Annular bulge or broad-based central disc protrusion with  superimposed small caudally dissecting extruded disc fragment within  the left lateral recess measuring approximately 0.5 cm in AP diameter.  This contacts but does not appear to displace the left S1 nerve root.  No central stenosis. Bilateral annular bulging results in moderate  right L5 foraminal stenosis. The left neural foramen appears to be  patent.  IMPRESSION: L5-S1 mild or early degenerative disc disease, left  lateral recess 0.5 cm caudally dissecting disc herniation contacting  but not displacing the left S1 nerve root, and moderate right L5  foraminal stenosis.    Assessment:  1. Lumbar radiculopathy    2. Lumbar disc herniation      Given lack of improvement with prednisone and physical therapy, we discussed lumbar epidural steroid injection next step.  Continue physical therapy and follow-up 2 weeks postinjection.    Plan:  - Today's Plan of Care:  Referral to Pain Management  Work Restrictions    Follow Up: 2 weeks post injection    Concerning signs and symptoms were reviewed.  The patient expressed understanding of this management plan and all questions were answered at this time.    Anna Ventura MD [facility]  Primary Care Sports Medicine  [facility]

## 2018-05-04 ENCOUNTER — TELEPHONE (OUTPATIENT)
Dept: PALLIATIVE MEDICINE | Facility: CLINIC | Age: 33
End: 2018-05-04

## 2018-05-04 NOTE — TELEPHONE ENCOUNTER
Left voicemail for patient to schedule Lumbar ENIO.        Arely SHI    [facility] [place]

## 2018-05-07 NOTE — TELEPHONE ENCOUNTER
Pre-screening Questions for Radiology Injections:    Injection to be done at which interventional clinic site? [facility] [place]   If [place], instruct patient to arrive 30 minutes before the scheduled appointment time.     Procedure ordered by Dr. Ventura    Procedure ordered? Lumbar Epidural Steroid Injection    What insurance would patient like us to bill for this procedure? Worker's Comp      Worker's comp or MVA (motor vehicle accident) -Any injection DO NOT SCHEDULE and route to Sue Rodriguez.      [employer] - For SI joint injections, DO NOT SCHEDULE and route Kaitlyn Vivar. [facility] NO PA REQUIRED EFFECTIVE 11/1/2017      [employer]- MBB's must be scheduled at LEAST two weeks apart      [employer] - Any injection besides hip/shoulder/knee joint DO NOT SCHEDULE and route to Kaitlyn Vivar. She will obtain PA and call pt back to schedule procedure or notify pt of denial.       [employer]-Route to Kaitlyn for review    Any chance of pregnancy? NO   If YES, do NOT schedule and route to RN pool    Is an  needed? No     Patient has a drive home? (mandatory) YES:     Is patient taking any blood thinners (plavix, coumadin, jantoven, warfarin, heparin, pradaxa or dabigatran )? No   If hold needed, do NOT schedule, route to RN pool     Is patient taking any aspirin products? No     If more than 325mg/day do NOT schedule; route to RN pool     For CERVICAL procedures, hold all aspirin products for 6 days.      Does the patient have a bleeding or clotting disorder? No     If YES, okay to schedule AND route to RN nurse pool    **For any patients with platelet count <100, must be forwarded to provider**    Is patient diabetic?  No  If YES, have them bring their glucometer.    Does patient have an active infection or treated for one within the past week? No     Is patient currently taking any antibiotics?  No     For patients on chronic, preventative, or prophylactic  antibiotics, procedures may be scheduled.     For patients on antibiotics for active or recent infection:    Radha Ramesh Burton, [place]-antibiotic course must have been completed for 4 days    Is patient currently taking any steroid medications? (i.e. Prednisone, Medrol)  No     For patients on steroid medications:    Radha Ramesh Burton, [place]-steroid course must have been completed for 4 days    Reviewed with patient:  If you are started on any steroids or antibiotics between now and your appointment, you must contact us because it may affect our ability to perform your procedure.  Yes    Is patient actively being treated for cancer or immunocompromised? No  If YES, do NOT schedule and route to RN pool     Are you able to get on and off an exam table with minimal or no assistance? Yes  If NO, do NOT schedule and route to RN pool    Are you able to roll over and lay on your stomach with minimal or no assistance? Yes  If NO, do NOT schedule and route to RN pool     Any allergies to contrast dye, iodine, shellfish, or numbing and steroid medications? No  If YES, route to RN pool AND add allergy information to appointment notes    Allergies: Nkda [no known drug allergies]      Has the patient had a flu shot or any other vaccinations within 7 days before or after the procedure.  No     Does patient have an MRI/CT?  YES: 3/2018  (SI joint, hip injections, lumbar sympathetic blocks, and stellate ganglion blocks do not require an MRI)    Was the MRI done w/in the last 3 years?  Yes    Was MRI done at [facility]? Yes      If not, where was it done? N/A       If MRI was not done at [facility], [facility] do NOT schedule and route to nursing.  If pt has an imaging disc, the injection may be scheduled but pt has to bring disc to appt. If they show up w/out disc the injection cannot be done    Reminders (please tell patient if applicable):       Instructed pt to arrive 30 minutes early  for IV start if this is for a cervical procedure, ALL sympathetic (stellate ganglion, hypogastric, or lumbar sympathetic block) and all sedation procedures (RFA, spinal cord stimulation trials).  Not Applicable   -IVs are not routinely placed for Dr. Whatley cervical cases   -Dr. Aceves: IVs for cervical ESIs and cervical TBDs (not CMBBs/facet inj)      If NPO for sedation, informed patient that it is okay to take medications with sips of water (except if they are to hold blood thinners).  Not Applicable   *DO take blood pressure medication if it is prescribed*      If this is for a cervical ENIO, informed patient that aspirin needs to be held for 6 days.   Not Applicable      For all patients not having spinal cord stimulator (SCS) trials or radiofrequency ablations (RFAs), informed patient:    IV sedation is not provided for this procedure.  If you feel that an oral anti-anxiety medication is needed, you can discuss this further with your referring provider or primary care provider.  The Pain Clinic provider will discuss specifics of what the procedure includes at your appointment.  Most procedures last 10-20 minutes.  We use numbing medications to help with any discomfort during the procedure.  NO      Do not schedule procedures requiring IV placement in the first appointment of the day or first appointment after lunch. Do NOT schedule at 0745, 0815 or 1245.       For patients 85 or older we recommend having an adult stay w/ them for the remainder of the day.       Does the patient have any questions?  NO  Arely Garvin  [facility]

## 2018-05-08 NOTE — TELEPHONE ENCOUNTER
Reached out to patient to obtain WC  information.  Pt states she will give a call back once she has found it.   Clinic number given.             Arely SHI    [facility] [place]

## 2018-05-10 NOTE — TELEPHONE ENCOUNTER
Pt calling back to give WC  information.   After calling to check status writer was updated with new  info.     Mirian Brown  Ph. [phone]    Called   for authorization. Waiting for call back.    out until 5/14.        Arely SHI    [facility] [place]

## 2018-05-21 NOTE — TELEPHONE ENCOUNTER
Faxed PA request to WC  w/ supporting documentation. Waiting for response.   RightFax confirmed at 8:51 am        Arely SHI    [facility] [place]

## 2018-05-21 NOTE — TELEPHONE ENCOUNTER
Called   for authorization. Waiting for call back.         Arely SHI    [facility] [place]

## 2018-05-23 NOTE — TELEPHONE ENCOUNTER
Received fax authorization for Lumbar ENIO.   Patient notified by SALOMON.    After scheduling patient please route back to Arely SHI For WC review.         Arely SHI    [facility] [place]

## 2018-05-23 NOTE — TELEPHONE ENCOUNTER
LVM w/ work  about patient's scheduled appt.         Arely SHI    [facility] [place]

## 2018-05-31 ENCOUNTER — RADIOLOGY INJECTION OFFICE VISIT (OUTPATIENT)
Dept: PALLIATIVE MEDICINE | Facility: CLINIC | Age: 33
End: 2018-05-31
Payer: OTHER MISCELLANEOUS

## 2018-05-31 ENCOUNTER — RADIANT APPOINTMENT (OUTPATIENT)
Dept: RADIOLOGY | Facility: CLINIC | Age: 33
End: 2018-05-31
Attending: PAIN MEDICINE
Payer: OTHER MISCELLANEOUS

## 2018-05-31 VITALS — OXYGEN SATURATION: 97 % | HEART RATE: 84 BPM | SYSTOLIC BLOOD PRESSURE: 137 MMHG | DIASTOLIC BLOOD PRESSURE: 89 MMHG

## 2018-05-31 DIAGNOSIS — M54.16 LUMBAR RADICULOPATHY: Primary | ICD-10-CM

## 2018-05-31 DIAGNOSIS — M54.16 LUMBAR RADICULOPATHY: ICD-10-CM

## 2018-05-31 PROCEDURE — 64484 NJX AA&/STRD TFRM EPI L/S EA: CPT | Mod: LT | Performed by: PAIN MEDICINE

## 2018-05-31 PROCEDURE — 64483 NJX AA&/STRD TFRM EPI L/S 1: CPT | Mod: LT | Performed by: PAIN MEDICINE

## 2018-05-31 ASSESSMENT — PAIN SCALES - GENERAL: PAINLEVEL: MODERATE PAIN (4)

## 2018-05-31 NOTE — NURSING NOTE
Pre-procedure Intake    Have you been fasting? NA    If yes, for how long? No     Are you taking a prescribed blood thinner such as coumadin, Plavix, Xarelto?    No    If yes, when did you take your last dose? No     Do you take aspirin?  No    If cervical procedure, have you held aspirin for 6 days?   NA    Do you have any allergies to contrast dye, iodine, steroid and/or numbing medications?  NO    Are you currently taking antibiotics or have an active infection?  NO    Have you had a fever/elevated temperature within the past week? NO    Are you currently taking oral steroids? NO    Do you have a ? Yes       Are you pregnant or breastfeeding?  NO    Are the vital signs normal?  Yes    Betsey Junior MA

## 2018-05-31 NOTE — MR AVS SNAPSHOT
After Visit Summary   5/31/2018    Santos Bagley    MRN: [id number]           Patient Information     Date Of Birth          1985        Visit Information        Provider Department      5/31/2018 1:45 PM Nico Aceves MD [facility]        Care Instructions    [facility]   Procedure Discharge Instructions    Today you saw: Dr. Nico Aceves    You had an:  Epidural steroid injection      Medications used:  Bupivacaine   Omnipaque     Kenalog         Be cautious when walking. Numbness and/or weakness in the lower extremities may occur for up to 6-8 hours after the procedure due to effect of the local anesthetic    Do not drive for 6 hours. The effect of the local anesthetic could slow your reflexes.     You may resume your regular activities after 24 hours    Avoid strenuous activity for the first 24 hours    You may shower, however avoid swimming, tub baths or hot tubs for 24 hours following your procedure    You may have a mild to moderate increase in pain for several days following the injection.    It may take up to 14 days for the steroid medication to start working although you may feel the effect as early as a few days after the procedure.       You may use ice packs for 10-15 minutes, 3 to 4 times a day at the injection site for comfort    Do not use heat to painful areas for 6 to 8 hours. This will give the local anesthetic time to wear off and prevent you from accidentally burning your skin.     You may use anti-inflammatory medications (such as Ibuprofen or Aleve or Advil) or Tylenol for pain control if necessary    If you have diabetes, check your blood sugar more frequently than usual as your blood sugar may be higher than normal for 10-14 days following a steroid injection. Contact your doctor who manages your diabetes if your blood sugar is higher than usual    If you experience any of the following, call the Pain Clinic during work  hours at [phone] or the Provider Line after hours at [phone]:  -Fever over 100 degree F  -Swelling, bleeding, redness, drainage, warmth at the injection site  -Progressive weakness or numbness in your legs or arms  -Loss of bowel or bladder function  -Unusual headache that is not relieved by Tylenol or other pain reliever  -Unusual new onset of pain that is not improving                Follow-ups after your visit        Who to contact     If you have questions or need follow up information about today's clinic visit or your schedule please contact [facility] directly at [phone].  Normal or non-critical lab and imaging results will be communicated to you by Chargebackhart, letter or phone within 4 business days after the clinic has received the results. If you do not hear from us within 7 days, please contact the clinic through Geomagict or phone. If you have a critical or abnormal lab result, we will notify you by phone as soon as possible.  Submit refill requests through Appboy or call your pharmacy and they will forward the refill request to us. Please allow 3 business days for your refill to be completed.          Additional Information About Your Visit        ChargebackharPingboard Information     Appboy gives you secure access to your electronic health record. If you see a primary care provider, you can also send messages to your care team and make appointments. If you have questions, please call your primary care clinic.  If you do not have a primary care provider, please call [phone] and they will assist you.        Care EveryWhere ID     This is your Care EveryWhere ID. This could be used by other organizations to access your [place] medical records  [id number]        Your Vitals Were     Pulse                   92            Blood Pressure from Last 3 Encounters:   05/31/18 126/83   05/03/18 112/68   03/26/18 122/80    Weight from Last 3 Encounters:   05/03/18 101.3 kg (223 lb 4.8 oz)    03/26/18 100.2 kg (221 lb)   03/22/18 100.2 kg (221 lb)              Today, you had the following     No orders found for display       Primary Care Provider Office Phone # Fax #    CIPRIANO Cuevas [phone] [address] [facility] [place] [address]        Equal Access to Services     [facility]: Hadii aad ku hadasho Soomaali, waaxda luqadaha, qaybta kaalmada adeegyada, waxay idiin hayaan adeeg kharash la'aan ah. So [facility] [phone].    ATENCIÓN: Si habla español, tiene a saxena disposición servicios gratuitos de asistencia lingüística. [facility] [phone].    We comply with applicable federal civil rights laws and [place] laws. We do not discriminate on the basis of race, color, national origin, age, disability, sex, sexual orientation, or gender identity.            Thank you!     Thank you for choosing [facility]  for your care. Our goal is always to provide you with excellent care. Hearing back from our patients is one way we can continue to improve our services. Please take a few minutes to complete the written survey that you may receive in the mail after your visit with us. Thank you!             Your Updated Medication List - Protect others around you: Learn how to safely use, store and throw away your medicines at www.disposemymeds.org.          This list is accurate as of 5/31/18  2:09 PM.  Always use your most recent med list.                   Brand Name Dispense Instructions for use Diagnosis    diclofenac 50 MG EC tablet    VOLTAREN    90 tablet    Take 1 tablet (50 mg) by mouth 3 times daily as needed for moderate pain (take with food)    Lumbar pain with radiation down left leg       IBUPROFEN PO      Take 800 mg by mouth every 6 hours        lidocaine 5 % Patch    LIDODERM    30 patch    Apply up to 3 patches to painful area at once for up to 12 h within a 24 h period.  Remove after 12 hours.    Lumbar pain with radiation down left leg        methocarbamol 750 MG tablet    ROBAXIN    60 tablet    Take 1 tablet (750 mg) by mouth 3 times daily as needed for muscle spasms    Lumbar pain with radiation down left leg       NAPROXEN PO           predniSONE 20 MG tablet    DELTASONE    5 tablet    Take 1 tablet (20 mg) by mouth daily    Lumbar radiculopathy, Lumbar disc herniation       TYLENOL PO      Take by mouth 3 times daily

## 2018-05-31 NOTE — PROGRESS NOTES
Pre procedure Diagnosis: lumbar radiculopathy, lumbar degenerative disc disease   Post procedure Diagnosis: Same  Procedure performed: lumbar transforaminal epidural steroid injection at Left L5-SI, SI fluoroscopically guided, contrast controlled  Anesthesia: none  Complications: none  Operators: Nico Aceves MD, Evelyn Solitario MD    Indications:   Santos Bagley is a 33 year old female.  They have a history of left lower back pain that radiates down her left leg posteriorly into her ankle and sole of foot.  Exam is consistent with lumbar radiculopathy and they have tried conservative treatment including PT and medications.    MRI was done on 3/22/18 which showed   MR LUMBAR SPINE WITHOUT CONTRAST March 22, 2018 11:35 AM    HISTORY: Ongoing low back pain with left-sided sciatica since fall  three months ago/work injury. No improvement. Lumbar pain with  radiation down left leg.    TECHNIQUE: Sagittal T1 and T2, sagittal IR, and transverse proton  density and T2-weighted pulse sequences.    FINDINGS: Five lumbar vertebrae are assumed. Degenerative loss of disc  signal with normal disc height is present at L5-S1. Apophyseal joints  appear within normal limits with no periarticular marrow edema or  degenerative spondylolisthesis. Vertebral body heights are normal. The  conus medullaris is unremarkable in appearance on the sagittal images.    L2-L3, L3-L4, L4-L5: Normal  .  L5-S1: Annular bulge or broad-based central disc protrusion with  superimposed small caudally dissecting extruded disc fragment within  the left lateral recess measuring approximately 0.5 cm in AP diameter.  This contacts but does not appear to displace the left S1 nerve root.  No central stenosis. Bilateral annular bulging results in moderate  right L5 foraminal stenosis. The left neural foramen appears to be  Patent.    IMPRESSION: L5-S1 mild or early degenerative disc disease, left  lateral recess 0.5 cm caudally dissecting disc herniation  contacting  but not displacing the left S1 nerve root, and moderate right L5  foraminal stenosis.    Options/alternatives, benefits and risks were discussed with the patient including bleeding, infection, tissue trauma, numbness, weakness, paralysis, spinal cord injury, radiation exposure, headache and reaction to medications. Questions were answered to her satisfaction and she agrees to proceed. Voluntary informed consent was obtained and signed.     Vitals were reviewed: Yes  There were no vitals taken for this visit.  Allergies were reviewed:  Yes   Medications were reviewed:  Yes   Pre-procedure pain score: 6 /10    Procedure:  After getting informed consent, patient was brought into the procedure suite and was placed in a prone position on the procedure table.   A Pause for the Cause was performed.  Patient was prepped and draped in sterile fashion.     After identifying the left L5-SI and S1 neuroforamen, the C-arm was rotated to a left lateral oblique angle.  A total of 5 ml of Lidocaine 1% was used to anesthetize the skin and the needle track at a skin entry site coaxial with the fluoroscopy beam, and overriding the superior aspect of the neuroforamen.  A 22 gauge 5 inch spinal needle was advanced under intermittent fluoroscopy until it entered the foramen superiorly.    The position was then inspected from anteroposterior and lateral views, and the needle adjusted appropriately.  A total of 3ml of Omnipaque-300 was injected, confirming appropriate position, with spread into the nerve root sheath and the epidural space, with no intravascular uptake. 7 ml was wasted    Then, after repeated negative aspiration, a combination of Decadron 10 mg, 0.25% bupivacaine 2 ml, diluted with 3ml of normal saline was injected (divided evenly between the two sites).     Hemostasis was achieved, the area was cleaned, and bandaids were placed when appropriate.  The patient tolerated the procedure well, and was taken to the  recovery room.    Images were saved to PACS.    Post-procedure pain score: 3/10  Follow-up includes:   -f/u phone call in one week  -f/u with referring provider    Nico Aceves MD  [facility]  I saw and examined the patient with the Pain Fellow/Resident. I have reviewed and agree with the resident's note and plan of care and made changes and corrections directly to the body of the note.   TIME SPENT:     BY MYSELF AND FELLOW/RESIDENT TOGETHER with fellow the entire procedure.

## 2018-05-31 NOTE — PATIENT INSTRUCTIONS
[facility]   Procedure Discharge Instructions    Today you saw: Dr. Nico Aceves    You had an:  Epidural steroid injection      Medications used:  Bupivacaine   Omnipaque     Kenalog         Be cautious when walking. Numbness and/or weakness in the lower extremities may occur for up to 6-8 hours after the procedure due to effect of the local anesthetic    Do not drive for 6 hours. The effect of the local anesthetic could slow your reflexes.     You may resume your regular activities after 24 hours    Avoid strenuous activity for the first 24 hours    You may shower, however avoid swimming, tub baths or hot tubs for 24 hours following your procedure    You may have a mild to moderate increase in pain for several days following the injection.    It may take up to 14 days for the steroid medication to start working although you may feel the effect as early as a few days after the procedure.       You may use ice packs for 10-15 minutes, 3 to 4 times a day at the injection site for comfort    Do not use heat to painful areas for 6 to 8 hours. This will give the local anesthetic time to wear off and prevent you from accidentally burning your skin.     You may use anti-inflammatory medications (such as Ibuprofen or Aleve or Advil) or Tylenol for pain control if necessary    If you have diabetes, check your blood sugar more frequently than usual as your blood sugar may be higher than normal for 10-14 days following a steroid injection. Contact your doctor who manages your diabetes if your blood sugar is higher than usual    If you experience any of the following, call the Pain Clinic during work hours at [phone] or the Provider Line after hours at [phone]:  -Fever over 100 degree F  -Swelling, bleeding, redness, drainage, warmth at the injection site  -Progressive weakness or numbness in your legs or arms  -Loss of bowel or bladder function  -Unusual headache that is not relieved by  Tylenol or other pain reliever  -Unusual new onset of pain that is not improving

## 2018-05-31 NOTE — NURSING NOTE
Discharge Information    IV Discontiued Time:  NA    Amount of Fluid Infused:  NA    Discharge Criteria = When patient returns to baseline or as per MD order    Consciousness:  Pt is fully awake    Circulation:  BP +/- 20% of pre-procedure level    Respiration:  Patient is able to breathe deeply    O2 Sat:  Patient is able to maintain O2 Sat >92% on room air    Activity:  Moves 4 extremities on command    Ambulation:  Patient is able to stand and walk or stand and pivot into wheelchair    Dressing:  Clean/dry or No Dressing    Notes:   Discharge instructions and AVS given to patient    Patient meets criteria for discharge?  YES    Admitted to PCU?  No    Responsible adult present to accompany patient home?  Yes    Signature/Title:    Jessy Haynes RN Care Coordinator  [facility]

## 2018-06-05 ENCOUNTER — MYC MEDICAL ADVICE (OUTPATIENT)
Dept: ORTHOPEDICS | Facility: CLINIC | Age: 33
End: 2018-06-05

## 2018-06-08 ENCOUNTER — OFFICE VISIT (OUTPATIENT)
Dept: FAMILY MEDICINE | Facility: CLINIC | Age: 33
End: 2018-06-08
Payer: OTHER MISCELLANEOUS

## 2018-06-08 ENCOUNTER — TELEPHONE (OUTPATIENT)
Dept: FAMILY MEDICINE | Facility: CLINIC | Age: 33
End: 2018-06-08

## 2018-06-08 VITALS
SYSTOLIC BLOOD PRESSURE: 118 MMHG | BODY MASS INDEX: 41.91 KG/M2 | HEART RATE: 97 BPM | OXYGEN SATURATION: 98 % | WEIGHT: 222 LBS | DIASTOLIC BLOOD PRESSURE: 80 MMHG | TEMPERATURE: 97 F | HEIGHT: 61 IN

## 2018-06-08 DIAGNOSIS — M51.27 LUMBOSACRAL DISC HERNIATION: Primary | ICD-10-CM

## 2018-06-08 DIAGNOSIS — Z11.4 SCREENING FOR HIV (HUMAN IMMUNODEFICIENCY VIRUS): ICD-10-CM

## 2018-06-08 PROCEDURE — 99213 OFFICE O/P EST LOW 20 MIN: CPT | Performed by: FAMILY MEDICINE

## 2018-06-08 RX ORDER — NABUMETONE 500 MG/1
500-1000 TABLET, FILM COATED ORAL 2 TIMES DAILY PRN
Qty: 60 TABLET | Refills: 1 | Status: SHIPPED | OUTPATIENT
Start: 2018-06-08 | End: 2018-09-25

## 2018-06-08 NOTE — PROGRESS NOTES
SUBJECTIVE:   Santos Bagley is a 33 year old female who presents to clinic today for the following health issues:    Back Pain       Duration: on going since 12/2017        Specific cause: fall     Description:   Location of pain: low back lower  Character of pain: sharp, dull ache, stabbing, burning and cramping  Pain radiation:radiates into the right buttocks and radiates into the right leg  New numbness or weakness in legs, not attributed to pain:  YES    Intensity: Currently 6/10    History:   Pain interferes with job: YES,   History of back problems: no prior back problems  Any previous MRI or X-rays: Yes- at [facility].  Date unknow  Sees a specialist for back pain:  Yes, through  pain clinic, contact made with the following plan:   Therapies tried without relief: chiropractor and Physical Therapy    Alleviating factors:   Improved by: acetaminophen (Tylenol), chiropractor and Physical Therapy      Precipitating factors:  Worsened by: Bending, Standing and Sitting    Functional and Psychosocial Screen (Bebeto STarT Back):      Not performed today          Accompanying Signs & Symptoms:  Risk of Fracture:  None  Risk of Cauda Equina:  None  Risk of Infection:  None  Risk of Cancer:  None  Risk of Ankylosing Spondylitis:  Onset at age <35, male, AND morning back stiffness. no     Musculoskeletal problem/pain      Duration: Left leg pain since 12/2017    Description  Location: all over left leg    Intensity:  mild    Accompanying signs and symptoms: numbness and tingling    History  Previous similar problem: no   Previous evaluation:  x-ray, ultrasound and MRI    Precipitating or alleviating factors:  Trauma or overuse: YES  Aggravating factors include: none    Therapies tried and outcome: acetaminophen, Ibuprofen, chiropractor and physical therapy    Back pain that's radiating to left leg.  Pain is due to a herniated disk  Patient had cortisone shot in her back however pain is still there.    Patient has poor pain  "control.    Problem list and histories reviewed & adjusted, as indicated.  Additional history: as documented    BP Readings from Last 3 Encounters:   06/18/18 116/82   06/08/18 118/80   05/31/18 137/89    Wt Readings from Last 3 Encounters:   06/18/18 221 lb (100.2 kg)   06/08/18 222 lb (100.7 kg)   05/03/18 223 lb 4.8 oz (101.3 kg)        Reviewed and updated as needed this visit by clinical staff  Tobacco  Allergies  Meds  Problems  Med Hx  Surg Hx  Fam Hx  Soc Hx        Reviewed and updated as needed this visit by Provider  Allergies  Meds  Problems         ROS:  Constitutional, HEENT, cardiovascular, pulmonary, gi and gu systems are negative, except as otherwise noted.    OBJECTIVE:     /80  Pulse 97  Temp 97  F (36.1  C) (Oral)  Ht 5' 1.02\" (1.55 m)  Wt 222 lb (100.7 kg)  SpO2 98%  BMI 41.91 kg/m2  Body mass index is 41.91 kg/(m^2).  GENERAL: healthy, alert and no distress  EYES: Eyes grossly normal to inspection, PERRL and conjunctivae and sclerae normal  NECK: no adenopathy, no asymmetry, masses, or scars and thyroid normal to palpation  RESP: lungs clear to auscultation - no rales, rhonchi or wheezes  CV: regular rate and rhythm, normal S1 S2, no S3 or S4, no murmur, click or rub, no peripheral edema and peripheral pulses strong  MS: lumbar tenderness, (+)striaght leg  SKIN: no suspicious lesions or rashes  NEURO: Normal strength and tone, mentation intact and speech normal  PSYCH: mentation appears normal, affect normal/bright    ASSESSMENT/PLAN:     1. Lumbosacral disc herniation  Ibuprofen has caused some gastritis, will switch to relafen for the time being until patient is able to see her pain specialist  - nabumetone (RELAFEN) 500 MG tablet; Take 1-2 tablets (500-1,000 mg) by mouth 2 times daily as needed for moderate pain  Dispense: 60 tablet; Refill: 1      Follow up if symptoms worsen or fail to improve.     Arnoldo Brandon MD  [facility]    "

## 2018-06-08 NOTE — TELEPHONE ENCOUNTER
Reason for Call:  Other call back    Detailed comments: Patient wondering why her AVS said HIV screening when she was in for back pain. Please advise.     Phone Number Patient can be reached at: Home number on file [phone] (home)    Best Time: any     Can we leave a detailed message on this number? YES    Call taken on 6/8/2018 at 4:33 PM by Leonardo Banuelos

## 2018-06-08 NOTE — MR AVS SNAPSHOT
After Visit Summary   6/8/2018    Santos Bagley    MRN: [id number]           Patient Information     Date Of Birth          1985        Visit Information        Provider Department      6/8/2018 3:40 PM Arnoldo Brandon MD [facility]        Today's Diagnoses     Screening for HIV (human immunodeficiency virus)    -  1    Lumbosacral disc herniation          Care Instructions    [facility]    If you have any questions regarding to your visit please contact your care team:       Team Purple:   Clinic Hours Telephone Number   Dr. Ita Brandon   7am-7pm  Monday - Thursday   7am-5pm  Fridays  [phone]  (Appointment scheduling available 24/7)    Questions about your recent visit?   Team Line:  [phone]   Urgent Care - [place] and [facility] - 11am-9pm Monday-Friday Saturday-Sunday- 9am-5pm   [place] - 5pm-9pm Monday-Friday Saturday-Sunday- 9am-5pm  [phone] - [place]  [phone] [facility]       What options do I have for a visit other than an office visit? We offer electronic visits (e-visits) and telephone visits, when medically appropriate.  Please check with your medical insurance to see if these types of visits are covered, as you will be responsible for any charges that are not paid by your insurance.      You can use [employer] (secure electronic communication) to access to your chart, send your primary care provider a message, or make an appointment. Ask a team member how to get started.     For a price quote for your services, please call our Consumer Price Line at [phone] or our Imaging Cost estimation line at [phone] (for imaging tests).        Riley MCDONALD MA            Follow-ups after your visit        Who to contact     If you have questions or need follow up information about today's clinic visit or your schedule please contact [facility]  "directly at [phone].  Normal or non-critical lab and imaging results will be communicated to you by [employer]art, letter or phone within 4 business days after the clinic has received the results. If you do not hear from us within 7 days, please contact the clinic through Art[employer]art or phone. If you have a critical or abnormal lab result, we will notify you by phone as soon as possible.  Submit refill requests through [employer] or call your pharmacy and they will forward the refill request to us. Please allow 3 business days for your refill to be completed.          Additional Information About Your Visit        [employer] Information     [employer] gives you secure access to your electronic health record. If you see a primary care provider, you can also send messages to your care team and make appointments. If you have questions, please call your primary care clinic.  If you do not have a primary care provider, please call [phone] and they will assist you.        Care EveryWhere ID     This is your Care EveryWhere ID. This could be used by other organizations to access your [facility] medical records  [id number]        Your Vitals Were     Pulse Temperature Height Pulse Oximetry BMI (Body Mass Index)       97 97  F (36.1  C) (Oral) 5' 1.02\" (1.55 m) 98% 41.91 kg/m2        Blood Pressure from Last 3 Encounters:   06/08/18 118/80   05/31/18 137/89   05/03/18 112/68    Weight from Last 3 Encounters:   06/08/18 222 lb (100.7 kg)   05/03/18 223 lb 4.8 oz (101.3 kg)   03/26/18 221 lb (100.2 kg)              Today, you had the following     No orders found for display         Today's Medication Changes          These changes are accurate as of 6/8/18  4:27 PM.  If you have any questions, ask your nurse or doctor.               Start taking these medicines.        Dose/Directions    nabumetone 500 MG tablet   Commonly known as:  RELAFEN   Used for:  Lumbosacral disc herniation   Started by:  Arnoldo Aparicio MD "        Dose:  500-1000 mg   Take 1-2 tablets (500-1,000 mg) by mouth 2 times daily as needed for moderate pain   Quantity:  60 tablet   Refills:  1            Where to get your medicines      These medications were sent to [facility] - [address]  [address] [address]     Phone:  [phone]     nabumetone 500 MG tablet                Primary Care Provider Office Phone # Fax #    Gina Ferguson Ovidio, APRN [facility] [phone]       [address] [address]        Equal Access to Services     [facility]: Hadii aad ku hadasho Soomaali, waaxda luqadaha, qaybta kaalmada adeegyada, waxmark bose hayaan dhara madera . So [facility] [phone].    ATENCIÓN: Si habla español, tiene a saxena disposición servicios gratuitos de asistencia lingüística. Llame al [phone].    We comply with applicable federal civil rights laws and [place] laws. We do not discriminate on the basis of race, color, national origin, age, disability, sex, sexual orientation, or gender identity.            Thank you!     Thank you for choosing [facility]  for your care. Our goal is always to provide you with excellent care. Hearing back from our patients is one way we can continue to improve our services. Please take a few minutes to complete the written survey that you may receive in the mail after your visit with us. Thank you!             Your Updated Medication List - Protect others around you: Learn how to safely use, store and throw away your medicines at www.disposemymeds.org.          This list is accurate as of 6/8/18  4:27 PM.  Always use your most recent med list.                   Brand Name Dispense Instructions for use Diagnosis    diclofenac 50 MG EC tablet    VOLTAREN    90 tablet    Take 1 tablet (50 mg) by mouth 3 times daily as needed for moderate pain (take with food)    Lumbar pain with radiation down left leg        IBUPROFEN PO      Take 800 mg by mouth every 6 hours        lidocaine 5 % Patch    LIDODERM    30 patch    Apply up to 3 patches to painful area at once for up to 12 h within a 24 h period.  Remove after 12 hours.    Lumbar pain with radiation down left leg       methocarbamol 750 MG tablet    ROBAXIN    60 tablet    Take 1 tablet (750 mg) by mouth 3 times daily as needed for muscle spasms    Lumbar pain with radiation down left leg       nabumetone 500 MG tablet    RELAFEN    60 tablet    Take 1-2 tablets (500-1,000 mg) by mouth 2 times daily as needed for moderate pain    Lumbosacral disc herniation       NAPROXEN PO           predniSONE 20 MG tablet    DELTASONE    5 tablet    Take 1 tablet (20 mg) by mouth daily    Lumbar radiculopathy, Lumbar disc herniation       TYLENOL PO      Take by mouth 3 times daily

## 2018-06-08 NOTE — PATIENT INSTRUCTIONS
[facility]    If you have any questions regarding to your visit please contact your care team:       Team Purple:   Clinic Hours Telephone Number   Dr. Ita Brandon   7am-7pm  Monday - Thursday   7am-5pm  Fridays  [phone]  (Appointment scheduling available 24/7)    Questions about your recent visit?   Team Line:  [phone]   Urgent Care - [place] and [facility] - 11am-9pm Monday-Friday Saturday-Sunday- 9am-5pm   [place] - 5pm-9pm Monday-Friday Saturday-Sunday- 9am-5pm  [phone] - [place]  [phone] - [place]       What options do I have for a visit other than an office visit? We offer electronic visits (e-visits) and telephone visits, when medically appropriate.  Please check with your medical insurance to see if these types of visits are covered, as you will be responsible for any charges that are not paid by your insurance.      You can use [employer] (secure electronic communication) to access to your chart, send your primary care provider a message, or make an appointment. Ask a team member how to get started.     For a price quote for your services, please call our Consumer Price Line at [phone] or our Imaging Cost estimation line at [phone] (for imaging tests).        Riley MCDONALD MA

## 2018-06-11 NOTE — TELEPHONE ENCOUNTER
[facility] wants ultrasound to screen everyone for HIV, so order is entered automatically, I didn't cancel order before AVS was printed, no blood work was done.    Arnoldo Brandon MD

## 2018-06-11 NOTE — TELEPHONE ENCOUNTER
Called patient. Woman answered and stated patient was not home. Purple team number left for patient to call back.  Jessy HERNÁNDEZ CMA ([facility])

## 2018-06-13 NOTE — TELEPHONE ENCOUNTER
Spoke to patient and informed her of below message from provider. No further help needed.  Jessy HERNÁNDEZ CMA ([facility])

## 2018-06-18 ENCOUNTER — OFFICE VISIT (OUTPATIENT)
Dept: ORTHOPEDICS | Facility: CLINIC | Age: 33
End: 2018-06-18
Payer: COMMERCIAL

## 2018-06-18 VITALS
WEIGHT: 221 LBS | SYSTOLIC BLOOD PRESSURE: 116 MMHG | BODY MASS INDEX: 41.72 KG/M2 | DIASTOLIC BLOOD PRESSURE: 82 MMHG | HEIGHT: 61 IN

## 2018-06-18 DIAGNOSIS — M54.16 LUMBAR RADICULOPATHY: Primary | ICD-10-CM

## 2018-06-18 PROCEDURE — 99214 OFFICE O/P EST MOD 30 MIN: CPT | Performed by: PEDIATRICS

## 2018-06-18 NOTE — PROGRESS NOTES
"Sports Medicine Clinic Visit - Interim History June 18, 2018    PCP: Gina Maldonado    Santos Bagley is a 33 year old female who is seen in f/u up for Lumbar radiculopathy. Since last visit on 5/3/18 patient has continued back pain. She had lumbar injections on 5/31 with pain management with no improvement in pain. Pain is currently in the lower back with severe radiating pain to left calf.    Symptoms are better with: Ibuprofen  Symptoms are worse with: sitting, bending, walking, and standing  Additional Features:   Positive: Cramping in left lower extremity   Negative: swelling, bruising, popping, grinding, catching, locking, instability, paresthesias, numbness and weakness    Social History: Dialysis Tech    Review of Systems  Skin: no bruising, no swelling  Musculoskeletal: as above  Neurologic: yes numbness, paresthesias  Remainder of review of systems is negative including constitutional, CV, pulmonary, GI, except as noted in HPI or medical history.    Patient's current problem list, past medical and surgical history, and family history were reviewed.    Patient Active Problem List   Diagnosis     Chronic tonsillitis     Hyperlipidemia LDL goal <160     Class 2 obesity due to excess calories without serious comorbidity with body mass index (BMI) of 35.0 to 35.9 in adult     Impaired fasting glucose     Cervical high risk HPV (human papillomavirus) test positive     Lumbar radiculopathy     Past Medical History:   Diagnosis Date     Cervical high risk HPV (human papillomavirus) test positive 03/22/2018    See Problem List     Severe Cervical Dysplasia 7/02    LEEP, Needs annual pap smear screening     Past Surgical History:   Procedure Laterality Date     TUBAL LIGATION  2009     No family history on file.    Objective  /82 (BP Location: Left arm, Patient Position: Chair, Cuff Size: Adult Regular)  Ht 5' 1\" (1.549 m)  Wt 221 lb (100.2 kg)  BMI 41.76 kg/m2    GENERAL APPEARANCE: healthy, alert " and no distress   GAIT: NORMAL  SKIN: no suspicious lesions or rashes  HEENT: Sclera clear, anicteric  CV: good peripheral pulses  RESP: Breathing not labored  NEURO: Normal strength and tone, mentation intact and speech normal  PSYCH:  mentation appears normal and affect normal/bright    Low back exam:  Inspection:     no visible deformity in the low back       normal skin       normal vascular       normal lymphatic       no asymmetry      Posture:      normal      Tender:     None currently      Non Tender:     remainder of lumbar spine      ROM:      limited flexion due to pain       limited extension due to pain      Strength:     hip flexion 5/5 bilateral       knee extension 5/5 bilateral       ankle dorsiflexion 5/5 bilateral       ankle plantarflexion 5/5 bilateral       dorsiflexion of the great toe 5/5 bilateral       able to heel and toe walk      Reflexes:     patellar (L3, L4) symmetric normal       achilles tendons (S1) symmetric normal      Sensation:    grossly intact throughout lower extremities      Special tests:      straight leg raise and slump test left positive        straight leg raise right negative       neg (-) TUTU  left       neg (-) FADIR  left    Radiology  I ordered, visualized and reviewed these images with the patient  LUMBAR SPINE TWO-THREE VIEWS  1/15/2018 2:20 PM    HISTORY: Fall, subsequent encounter.  COMPARISON: None.  IMPRESSION: No fracture or malalignment. No evidence of arthritis.  Tubal ligation clips in the pelvis.      MR LUMBAR SPINE WITHOUT CONTRAST March 22, 2018 11:35 AM  HISTORY: Ongoing low back pain with left-sided sciatica since fall  three months ago/work injury. No improvement. Lumbar pain with  radiation down left leg.  TECHNIQUE: Sagittal T1 and T2, sagittal IR, and transverse proton  density and T2-weighted pulse sequences.  FINDINGS: Five lumbar vertebrae are assumed. Degenerative loss of disc  signal with normal disc height is present at L5-S1.  Apophyseal joints  appear within normal limits with no periarticular marrow edema or  degenerative spondylolisthesis. Vertebral body heights are normal. The  conus medullaris is unremarkable in appearance on the sagittal images.  L2-L3, L3-L4, L4-L5: Normal.  L5-S1: Annular bulge or broad-based central disc protrusion with  superimposed small caudally dissecting extruded disc fragment within  the left lateral recess measuring approximately 0.5 cm in AP diameter.  This contacts but does not appear to displace the left S1 nerve root.  No central stenosis. Bilateral annular bulging results in moderate  right L5 foraminal stenosis. The left neural foramen appears to be  patent.  IMPRESSION: L5-S1 mild or early degenerative disc disease, left  lateral recess 0.5 cm caudally dissecting disc herniation contacting  but not displacing the left S1 nerve root, and moderate right L5  foraminal stenosis.    Assessment:  1. Lumbar radiculopathy      Given lack of improvement with PT and injection, will refer to spine surgery for consultation.    Plan:  - Today's Plan of Care:  Referral to a Spine Surgeon    Follow Up: as needed    Concerning signs and symptoms were reviewed.  The patient expressed understanding of this management plan and all questions were answered at this time.    Anna Ventura MD CABARBI  Primary Care Sports Medicine  [facility]

## 2018-06-18 NOTE — LETTER
6/18/2018         RE: Santos Bagley  [address] [place] [id number]        Dear Colleague,    Thank you for referring your patient, Santos Bagley, to the [facility]. Please see a copy of my visit note below.    Sports Medicine Clinic Visit - Interim History June 18, 2018    PCP: Gina Maldonado    Santos Bagley is a 33 year old female who is seen in f/u up for Lumbar radiculopathy. Since last visit on 5/3/18 patient has continued back pain. She had lumbar injections on 5/31 with pain management with no improvement in pain. Pain is currently in the lower back with severe radiating pain to left calf.    Symptoms are better with: Ibuprofen  Symptoms are worse with: sitting, bending, walking, and standing  Additional Features:   Positive: Cramping in left lower extremity   Negative: swelling, bruising, popping, grinding, catching, locking, instability, paresthesias, numbness and weakness    Social History: Dialysis Tech    Review of Systems  Skin: no bruising, no swelling  Musculoskeletal: as above  Neurologic: yes numbness, paresthesias  Remainder of review of systems is negative including constitutional, CV, pulmonary, GI, except as noted in HPI or medical history.    Patient's current problem list, past medical and surgical history, and family history were reviewed.    Patient Active Problem List   Diagnosis     Chronic tonsillitis     Hyperlipidemia LDL goal <160     Class 2 obesity due to excess calories without serious comorbidity with body mass index (BMI) of 35.0 to 35.9 in adult     Impaired fasting glucose     Cervical high risk HPV (human papillomavirus) test positive     Lumbar radiculopathy     Past Medical History:   Diagnosis Date     Cervical high risk HPV (human papillomavirus) test positive 03/22/2018    See Problem List     Severe Cervical Dysplasia 7/02    LEEP, Needs annual pap smear screening     Past Surgical History:   Procedure Laterality Date      "TUBAL LIGATION  2009     No family history on file.    Objective  /82 (BP Location: Left arm, Patient Position: Chair, Cuff Size: Adult Regular)  Ht 5' 1\" (1.549 m)  Wt 221 lb (100.2 kg)  BMI 41.76 kg/m2    GENERAL APPEARANCE: healthy, alert and no distress   GAIT: NORMAL  SKIN: no suspicious lesions or rashes  HEENT: Sclera clear, anicteric  CV: good peripheral pulses  RESP: Breathing not labored  NEURO: Normal strength and tone, mentation intact and speech normal  PSYCH:  mentation appears normal and affect normal/bright    Low back exam:  Inspection:     no visible deformity in the low back       normal skin       normal vascular       normal lymphatic       no asymmetry      Posture:      normal      Tender:     None currently      Non Tender:     remainder of lumbar spine      ROM:      limited flexion due to pain       limited extension due to pain      Strength:     hip flexion 5/5 bilateral       knee extension 5/5 bilateral       ankle dorsiflexion 5/5 bilateral       ankle plantarflexion 5/5 bilateral       dorsiflexion of the great toe 5/5 bilateral       able to heel and toe walk      Reflexes:     patellar (L3, L4) symmetric normal       achilles tendons (S1) symmetric normal      Sensation:    grossly intact throughout lower extremities      Special tests:      straight leg raise and slump test left positive        straight leg raise right negative       neg (-) TUTU  left       neg (-) FADIR  left    Radiology  I ordered, visualized and reviewed these images with the patient  LUMBAR SPINE TWO-THREE VIEWS  1/15/2018 2:20 PM    HISTORY: Fall, subsequent encounter.  COMPARISON: None.  IMPRESSION: No fracture or malalignment. No evidence of arthritis.  Tubal ligation clips in the pelvis.      MR LUMBAR SPINE WITHOUT CONTRAST March 22, 2018 11:35 AM  HISTORY: Ongoing low back pain with left-sided sciatica since fall  three months ago/work injury. No improvement. Lumbar pain with  radiation down " left leg.  TECHNIQUE: Sagittal T1 and T2, sagittal IR, and transverse proton  density and T2-weighted pulse sequences.  FINDINGS: Five lumbar vertebrae are assumed. Degenerative loss of disc  signal with normal disc height is present at L5-S1. Apophyseal joints  appear within normal limits with no periarticular marrow edema or  degenerative spondylolisthesis. Vertebral body heights are normal. The  conus medullaris is unremarkable in appearance on the sagittal images.  L2-L3, L3-L4, L4-L5: Normal.  L5-S1: Annular bulge or broad-based central disc protrusion with  superimposed small caudally dissecting extruded disc fragment within  the left lateral recess measuring approximately 0.5 cm in AP diameter.  This contacts but does not appear to displace the left S1 nerve root.  No central stenosis. Bilateral annular bulging results in moderate  right L5 foraminal stenosis. The left neural foramen appears to be  patent.  IMPRESSION: L5-S1 mild or early degenerative disc disease, left  lateral recess 0.5 cm caudally dissecting disc herniation contacting  but not displacing the left S1 nerve root, and moderate right L5  foraminal stenosis.    Assessment:  1. Lumbar radiculopathy      Given lack of improvement with PT and injection, will refer to spine surgery for consultation.    Plan:  - Today's Plan of Care:  Referral to a Spine Surgeon    Follow Up: as needed    Concerning signs and symptoms were reviewed.  The patient expressed understanding of this management plan and all questions were answered at this time.    Anna Ventura MD CAQ  Primary Care Sports Medicine  [facility]    Again, thank you for allowing me to participate in the care of your patient.        Sincerely,        Anna Ventura MD

## 2018-06-18 NOTE — MR AVS SNAPSHOT
After Visit Summary   6/18/2018    Santos Bagley    MRN: [id number]           Patient Information     Date Of Birth          1985        Visit Information        Provider Department      6/18/2018 6:20 PM Anna Ventura MD [facility] [place]        Today's Diagnoses     Lumbar radiculopathy    -  1      Care Instructions        Plan:  - Today's Plan of Care:  Referral to a Spine Surgeon    Follow Up: as needed    If you have any further questions for your physician or physician s care team you can call [phone] and use option 3 to leave a voice message. Calls received during business hours will be returned same day.              Follow-ups after your visit        Additional Services     ORTHO  REFERRAL       [facility] is referring you to the Orthopedic  Services at [facility].       The  Representative will assist you in the coordination of your Orthopedic and Musculoskeletal Care as prescribed by your physician.    The  Representative will call you within 1 business day to help schedule your appointment, or you may contact the  Representative at:    All areas ~ [phone]     Type of Referral : Spine: Lumbar  **Choose Medical Spine Specialist (unless patient was seen by a Medical Spine Specialist within the past 6 months).**  Surgical Evaluation is advised if the patient presents with one or more of the following red flags: Evidence of Spinal Tumor, Infection or Fracture, Cauda Equina Syndrome, Sudden or Progressive Weakness, Loss of Bowel or Bladder Control, or any other documented emergent neurological condition resulting from a Lumbar Spinal Condition. Spine Surgeon        Timeframe requested: Routine    Coverage of these services is subject to the terms and limitations of your health insurance plan.  Please call member services at your health plan with any benefit or coverage  "questions.      If X-rays, CT or MRI's have been performed, please contact the facility where they were done to arrange for , prior to your scheduled appointment.  Please bring this referral request to your appointment and present it to your specialist.                  Who to contact     If you have questions or need follow up information about today's clinic visit or your schedule please contact [facility] directly at [phone].  Normal or non-critical lab and imaging results will be communicated to you by N-Trighart, letter or phone within 4 business days after the clinic has received the results. If you do not hear from us within 7 days, please contact the clinic through Gear6 or phone. If you have a critical or abnormal lab result, we will notify you by phone as soon as possible.  Submit refill requests through Gear6 or call your pharmacy and they will forward the refill request to us. Please allow 3 business days for your refill to be completed.          Additional Information About Your Visit        Gear6 Information     [employer] gives you secure access to your electronic health record. If you see a primary care provider, you can also send messages to your care team and make appointments. If you have questions, please call your primary care clinic.  If you do not have a primary care provider, please call [phone] and they will assist you.        Care EveryWhere ID     This is your Care EveryWhere ID. This could be used by other organizations to access your [facility] medical records  [id number]        Your Vitals Were     Height BMI (Body Mass Index)                5' 1\" (1.549 m) 41.76 kg/m2           Blood Pressure from Last 3 Encounters:   06/18/18 116/82   06/08/18 118/80   05/31/18 137/89    Weight from Last 3 Encounters:   06/18/18 221 lb (100.2 kg)   06/08/18 222 lb (100.7 kg)   05/03/18 223 lb 4.8 oz (101.3 kg)              We Performed the Following     " ORTHO  REFERRAL        Primary Care Provider Office Phone # Fax #    Gina Maldonado, CIPRIANO [facility] [phone] [address]  [facility] [address]        Equal Access to Services     NI NICOLE : Hadii aad ku hadasho Soomaali, waaxda luqadaha, qaybta kaalmada adeegyada, waxmark idiin haytoriton aderoberta singletary laSelenalamont . So [facility] [phone].    ATENCIÓN: Si habla español, tiene a saxena disposición servicios gratuitos de asistencia lingüística. Llame al [phone].    We comply with applicable federal civil rights laws and [place] laws. We do not discriminate on the basis of race, color, national origin, age, disability, sex, sexual orientation, or gender identity.            Thank you!     Thank you for choosing [facility]  for your care. Our goal is always to provide you with excellent care. Hearing back from our patients is one way we can continue to improve our services. Please take a few minutes to complete the written survey that you may receive in the mail after your visit with us. Thank you!             Your Updated Medication List - Protect others around you: Learn how to safely use, store and throw away your medicines at www.disposemymeds.org.          This list is accurate as of 6/18/18  6:58 PM.  Always use your most recent med list.                   Brand Name Dispense Instructions for use Diagnosis    diclofenac 50 MG EC tablet    VOLTAREN    90 tablet    Take 1 tablet (50 mg) by mouth 3 times daily as needed for moderate pain (take with food)    Lumbar pain with radiation down left leg       IBUPROFEN PO      Take 800 mg by mouth every 6 hours        lidocaine 5 % Patch    LIDODERM    30 patch    Apply up to 3 patches to painful area at once for up to 12 h within a 24 h period.  Remove after 12 hours.    Lumbar pain with radiation down left leg       methocarbamol 750 MG tablet    ROBAXIN    60 tablet    Take 1 tablet (750 mg) by mouth 3  times daily as needed for muscle spasms    Lumbar pain with radiation down left leg       nabumetone 500 MG tablet    RELAFEN    60 tablet    Take 1-2 tablets (500-1,000 mg) by mouth 2 times daily as needed for moderate pain    Lumbosacral disc herniation       NAPROXEN PO           predniSONE 20 MG tablet    DELTASONE    5 tablet    Take 1 tablet (20 mg) by mouth daily    Lumbar radiculopathy, Lumbar disc herniation       TYLENOL PO      Take by mouth 3 times daily

## 2018-06-18 NOTE — PATIENT INSTRUCTIONS
Plan:  - Today's Plan of Care:  Referral to a Spine Surgeon    Follow Up: as needed    If you have any further questions for your physician or physician s care team you can call [phone] and use option 3 to leave a voice message. Calls received during business hours will be returned same day.

## 2018-06-25 NOTE — PROGRESS NOTES
Pt last seen in PT 04/26.  She has since seen Dr Ventura, had injection, and been referred to surgeon.  Consider PT note dated 04/26 to serve as final summary.

## 2018-06-26 ENCOUNTER — OFFICE VISIT (OUTPATIENT)
Dept: NEUROSURGERY | Facility: CLINIC | Age: 33
End: 2018-06-26
Payer: OTHER MISCELLANEOUS

## 2018-06-26 VITALS
OXYGEN SATURATION: 96 % | TEMPERATURE: 98.4 F | WEIGHT: 225.2 LBS | SYSTOLIC BLOOD PRESSURE: 133 MMHG | HEIGHT: 61 IN | DIASTOLIC BLOOD PRESSURE: 91 MMHG | BODY MASS INDEX: 42.52 KG/M2 | HEART RATE: 106 BPM

## 2018-06-26 DIAGNOSIS — M54.16 LUMBAR RADICULOPATHY: Primary | ICD-10-CM

## 2018-06-26 PROCEDURE — 99244 OFF/OP CNSLTJ NEW/EST MOD 40: CPT | Performed by: NEUROLOGICAL SURGERY

## 2018-06-26 ASSESSMENT — PAIN SCALES - GENERAL: PAINLEVEL: SEVERE PAIN (6)

## 2018-06-26 NOTE — PROGRESS NOTES
"Dr. Johnson Tomlin  [facility]  Neurosurgery Clinic Visit        This is a shared visit with Dr. Tomlin and myself, Sahra Coronado CNP    CC: Left leg pain    Primary care Provider: Gina Maldonado      Reason For Visit:   I was asked by Dr. Ventura to consult on the patient for lumbar radiculopathy.      HPI: Santos Bagley is a 33 year old female with LLE pain since a work incident on 12/20/17 when she slipped and fell on the ice.  She states the pain has bothered her since that time.  She feels the pain has remained the same, but without anti-inflammatory medication the pain is severe. She describes the pain as a constant \"pounding\" along the left posterior thigh, intermittently extending into the left calf.  She denies foot pain.  She denies RLE.  She states occasionally she'll notice stiffness across her lower back, \"But my back doesn't bother me as much.\"  She has increased pain with bending and lifting.  She has had PT and does not feel it has helped.  She has had one LESI and she does not feel it was helpful, she states she had more weakness and cramping after the injection.  She denies falls.  She denies changes to bowel or bladder.     Pain at its worst 10  Pain right now:  6    Past Medical History:   Diagnosis Date     Cervical high risk HPV (human papillomavirus) test positive 03/22/2018    See Problem List     Severe Cervical Dysplasia 7/02    LEEP, Needs annual pap smear screening       Past Medical History reviewed with patient during visit.    Past Surgical History:   Procedure Laterality Date     TUBAL LIGATION  2009     Past Surgical History reviewed with patient during visit.    Current Outpatient Prescriptions   Medication     Acetaminophen (TYLENOL PO)     diclofenac (VOLTAREN) 50 MG EC tablet     IBUPROFEN PO     lidocaine (LIDODERM) 5 % Patch     methocarbamol (ROBAXIN) 750 MG tablet     nabumetone (RELAFEN) 500 MG tablet     NAPROXEN PO     predniSONE (DELTASONE) " "20 MG tablet     No current facility-administered medications for this visit.        Allergies   Allergen Reactions     Nkda [No Known Drug Allergies]        Social History     Social History     Marital status:      Spouse name: N/A     Number of children: N/A     Years of education: N/A     Social History Main Topics     Smoking status: Former Smoker     Packs/day: 0.50     Types: Cigarettes     Smokeless tobacco: Never Used      Comment: 2 cig/day     Alcohol use No     Drug use: No     Sexual activity: Yes     Partners: Male     Other Topics Concern     None     Social History Narrative       History reviewed. No pertinent family history.      ROS: 10 point ROS neg other than the symptoms noted above in the HPI.    Vital Signs: BP (!) 133/91  Pulse 106  Temp 98.4  F (36.9  C) (Oral)  Ht 5' 1\" (1.549 m)  Wt 225 lb 3.2 oz (102.2 kg)  SpO2 96%  BMI 42.55 kg/m2    Examination:  Constitutional:  Alert, overweight, NAD.  HEENT: Normocephalic, atraumatic.   Pulm:  Without shortness of breath   CV:  No pitting edema of BLE.    Neurological:  Awake  Alert  Oriented x 3  Speech clear  Cranial nerves II - XII intact    Motor exam   Shoulder Abduction:  Right:  5/5   Left:  5/5  Biceps:                      Right:  5/5   Left:  5/5  Triceps:                     Right:  5/5   Left:  5/5  Wrist Extensors:       Right:  5/5   Left:  5/5  Wrist Flexors:           Right:  5/5   Left:  5/5  Intrinsics:                   Right:  5/5   Left:  5/5   Hip Flexor:                Right: 5/5  Left:  5/5  Hip Adductor:             Right:  5/5  Left:  5/5  Hip Abductor:             Right:  5/5  Left:  5/5  Gastroc Soleus:        Right:  5/5  Left:  5/5  Tib/Ant:                      Right:  5/5  Left:  5/5  EHL:                          Right:  5/5  Left:  5/5   Sensation normal to bilateral upper and lower extremities  Clonus negative  DTRs 1+ symmetric  Gait: Able to stand from a seated position. Normal non-antalgic, " "non-myelopathic gait.  Able to heel/toe walk without loss of balance  Cervical examination reveals good range of motion.  No tenderness to palpation of the cervical spine or paraspinous muscles bilaterally.    Lumbar examination reveals no tenderness of the spine or paraspinous muscles.  Hip height is symmetrical. Negative SI joint, sciatic notch or greater trochanteric tenderness to palpation bilaterally.  Straight leg positive on the left.  Limited ROM due to pain.      Imaging: Lumbar MRI 3/22/18:  IMPRESSION: L5-S1 mild or early degenerative disc disease, left  lateral recess 0.5 cm caudally dissecting disc herniation contacting but not displacing the left S1 nerve root, and moderate right L5 foraminal stenosis.    Assessment/Plan:  Lumbar Radiculopathy  Lumbar DDD    Santos Bagley is a 33 year old female with LLE pain since a work incident on 12/20/17 when she slipped and fell on the ice.  She states the pain has bothered her since that time.  She feels the pain has remained the same, but without anti-inflammatory medication the pain is severe. She describes the pain as a constant \"pounding\" along the left posterior thigh, intermittently extending into the left calf.  She denies foot pain.  She denies RLE.  She states occasionally she'll notice stiffness across her lower back, \"But my back doesn't bother me as much.\"  She has increased pain with bending and lifting.  She has had PT and does not feel it has helped.  She has had one LESI and she does not feel it was helpful, she states she had more weakness and cramping after the injection.  She denies falls.  She denies changes to bowel or bladder.  We reviewed MRI results and discussed pain pattern along the L5 distribution.  Dr. Tomlin discussed a minimally invasive microdiscectomy with the patient.  The patient is hesitant for surgery at this time but is open to a repeat lumbar ENIO.  If the pain does not improve, and/or she has increased pain or weakness, she " will contact us to schedule a minimally invasive L5-S1 microdiscectomy.    Patient Instructions   Schedule lumbar epidural steroid injection (someone will contact you)  Please contact the clinic if pain persists at [phone].        Sahra Coronado [facility]  [facility]  [address] [address]    Tel [phone]  Pager [phone]          Agree with above  Pt seen and examined.     Johnson Tomlin MD, MS, FAANS

## 2018-06-26 NOTE — NURSING NOTE
"Santos Bagley is a 33 year old female who presents for:  Chief Complaint   Patient presents with     Back Pain     WORK COMP. Low back injury. DOI 12/20/17. MRI 3/22/18. Xray 1/15/18. Referred by Dr. Ventura. Patient states she slipped on ice and fell forward on her hands and knees. Pain started in the left buttock the day she fell and worked its way down to her calf. Back pain a while later. She continues to get pain down the left leg. She will have cramping and N/T in left leg. She has seen a chiropractor and PT.         Vitals:    Vitals:    06/26/18 1511   BP: (!) 133/91   Pulse: 106   Temp: 98.4  F (36.9  C)   TempSrc: Oral   SpO2: 96%   Weight: 225 lb 3.2 oz (102.2 kg)   Height: 5' 1\" (1.549 m)       BMI:  Estimated body mass index is 42.55 kg/(m^2) as calculated from the following:    Height as of this encounter: 5' 1\" (1.549 m).    Weight as of this encounter: 225 lb 3.2 oz (102.2 kg).    Pain Score:  Severe Pain (6)        Estella Kurtz      "

## 2018-06-26 NOTE — LETTER
"    6/26/2018         RE: Santos Bagley  [address] [facility] [address]        Dear Colleague,    Thank you for referring your patient, Santos Bagley, to the [facility]. Please see a copy of my visit note below.    Dr. Johnson Tomlin  [facility]  Neurosurgery Clinic Visit        This is a shared visit with Dr. Tomlin and myself, Sahra Coronado CNP    CC: Left leg pain    Primary care Provider: Gina Maldonado      Reason For Visit:   I was asked by Dr. Ventura to consult on the patient for lumbar radiculopathy.      HPI: Santos Bagley is a 33 year old female with LLE pain since a work incident on 12/20/17 when she slipped and fell on the ice.  She states the pain has bothered her since that time.  She feels the pain has remained the same, but without anti-inflammatory medication the pain is severe. She describes the pain as a constant \"pounding\" along the left posterior thigh, intermittently extending into the left calf.  She denies foot pain.  She denies RLE.  She states occasionally she'll notice stiffness across her lower back, \"But my back doesn't bother me as much.\"  She has increased pain with bending and lifting.  She has had PT and does not feel it has helped.  She has had one LESI and she does not feel it was helpful, she states she had more weakness and cramping after the injection.  She denies falls.  She denies changes to bowel or bladder.     Pain at its worst 10  Pain right now:  6    Past Medical History:   Diagnosis Date     Cervical high risk HPV (human papillomavirus) test positive 03/22/2018    See Problem List     Severe Cervical Dysplasia 7/02    LEEP, Needs annual pap smear screening       Past Medical History reviewed with patient during visit.    Past Surgical History:   Procedure Laterality Date     TUBAL LIGATION  2009     Past Surgical History reviewed with patient during visit.    Current Outpatient Prescriptions   Medication     " "Acetaminophen (TYLENOL PO)     diclofenac (VOLTAREN) 50 MG EC tablet     IBUPROFEN PO     lidocaine (LIDODERM) 5 % Patch     methocarbamol (ROBAXIN) 750 MG tablet     nabumetone (RELAFEN) 500 MG tablet     NAPROXEN PO     predniSONE (DELTASONE) 20 MG tablet     No current facility-administered medications for this visit.        Allergies   Allergen Reactions     Nkda [No Known Drug Allergies]        Social History     Social History     Marital status:      Spouse name: N/A     Number of children: N/A     Years of education: N/A     Social History Main Topics     Smoking status: Former Smoker     Packs/day: 0.50     Types: Cigarettes     Smokeless tobacco: Never Used      Comment: 2 cig/day     Alcohol use No     Drug use: No     Sexual activity: Yes     Partners: Male     Other Topics Concern     None     Social History Narrative       History reviewed. No pertinent family history.      ROS: 10 point ROS neg other than the symptoms noted above in the HPI.    Vital Signs: BP (!) 133/91  Pulse 106  Temp 98.4  F (36.9  C) (Oral)  Ht 5' 1\" (1.549 m)  Wt 225 lb 3.2 oz (102.2 kg)  SpO2 96%  BMI 42.55 kg/m2    Examination:  Constitutional:  Alert, overweight, NAD.  HEENT: Normocephalic, atraumatic.   Pulm:  Without shortness of breath   CV:  No pitting edema of BLE.    Neurological:  Awake  Alert  Oriented x 3  Speech clear  Cranial nerves II - XII intact    Motor exam   Shoulder Abduction:  Right:  5/5   Left:  5/5  Biceps:                      Right:  5/5   Left:  5/5  Triceps:                     Right:  5/5   Left:  5/5  Wrist Extensors:       Right:  5/5   Left:  5/5  Wrist Flexors:           Right:  5/5   Left:  5/5  Intrinsics:                   Right:  5/5   Left:  5/5   Hip Flexor:                Right: 5/5  Left:  5/5  Hip Adductor:             Right:  5/5  Left:  5/5  Hip Abductor:             Right:  5/5  Left:  5/5  Gastroc Soleus:        Right:  5/5  Left:  5/5  Tib/Ant:                      " "Right:  5/5  Left:  5/5  EHL:                          Right:  5/5  Left:  5/5   Sensation normal to bilateral upper and lower extremities  Clonus negative  DTRs 1+ symmetric  Gait: Able to stand from a seated position. Normal non-antalgic, non-myelopathic gait.  Able to heel/toe walk without loss of balance  Cervical examination reveals good range of motion.  No tenderness to palpation of the cervical spine or paraspinous muscles bilaterally.    Lumbar examination reveals no tenderness of the spine or paraspinous muscles.  Hip height is symmetrical. Negative SI joint, sciatic notch or greater trochanteric tenderness to palpation bilaterally.  Straight leg positive on the left.  Limited ROM due to pain.      Imaging: Lumbar MRI 3/22/18:  IMPRESSION: L5-S1 mild or early degenerative disc disease, left  lateral recess 0.5 cm caudally dissecting disc herniation contacting but not displacing the left S1 nerve root, and moderate right L5 foraminal stenosis.    Assessment/Plan:  Lumbar Radiculopathy  Lumbar DDD    Santos Bagley is a 33 year old female with LLE pain since a work incident on 12/20/17 when she slipped and fell on the ice.  She states the pain has bothered her since that time.  She feels the pain has remained the same, but without anti-inflammatory medication the pain is severe. She describes the pain as a constant \"pounding\" along the left posterior thigh, intermittently extending into the left calf.  She denies foot pain.  She denies RLE.  She states occasionally she'll notice stiffness across her lower back, \"But my back doesn't bother me as much.\"  She has increased pain with bending and lifting.  She has had PT and does not feel it has helped.  She has had one LESI and she does not feel it was helpful, she states she had more weakness and cramping after the injection.  She denies falls.  She denies changes to bowel or bladder.  We reviewed MRI results and discussed pain pattern along the L5 distribution.  " Dr. Tomlin discussed a minimally invasive microdiscectomy with the patient.  The patient is hesitant for surgery at this time but is open to a repeat lumbar ENIO.  If the pain does not improve, and/or she has increased pain or weakness, she will contact us to schedule a minimally invasive L5-S1 microdiscectomy.    Patient Instructions   Schedule lumbar epidural steroid injection (someone will contact you)  Please contact the clinic if pain persists at [phone].        Sahra Coronado [facility]  [facility]  [address] [address]    Tel [phone]  Pager [phone]          Agree with above  Pt seen and examined.     Johnson Tomlin MD, MS, FAANS    Again, thank you for allowing me to participate in the care of your patient.        Sincerely,        Johnson Tomlin MD

## 2018-06-26 NOTE — PATIENT INSTRUCTIONS
Schedule lumbar epidural steroid injection (someone will contact you)  Please contact the clinic if pain persists at [phone].

## 2018-06-26 NOTE — MR AVS SNAPSHOT
After Visit Summary   6/26/2018    Santos Bagley    MRN: [id number]           Patient Information     Date Of Birth          1985        Visit Information        Provider Department      6/26/2018 3:00 PM Sahra Coronado NP [facility]        Today's Diagnoses     Lumbar radiculopathy    -  1      Care Instructions    Schedule lumbar epidural steroid injection (someone will contact you)  Please contact the clinic if pain persists at [phone].            Follow-ups after your visit        Additional Services     PAIN MANAGEMENT REFERRAL       Your provider has referred you to: [facility]: [facility] -    Reason for Referral: Procedure Order Epidural:  Lumbar (Advanced imaging required in the last 3 years)      What is your diagnosis for the patient's pain? Lumbar radiculopathy        For any questions, contact the [facility] [place] at [phone].     **ANY DIAGNOSTIC TESTS THAT ARE NOT IN EPIC SHOULD BE SENT TO THE PAIN CENTER**    REGARDING OPIOID MEDICATIONS:  The discussion of opioids management, appropriateness of therapy, and dosing will be discussed in patients being seen for evaluation.  The pain management clinics are not long-term prescribing clinics, with transition of prescribing of medications ultimately going back to the referring provider/PCP.  If prescribing is taken over at the pain clinic, it is in actively involved patients whom are appropriate for opioids, urine drug screening is completed, and long-term prescribing plan has been determined.  Therefore, we will not be automatically taking over prescribing at the patient's first visit.  Is this agreeable to you? agrees.     Please be aware that coverage of these services is subject to the terms and limitations of your health insurance plan.  Call member services at your health plan with any benefit or coverage questions.      Please bring the following with you to your  "appointment:    (1) Any X-Rays, CTs or MRIs which have been performed.  Contact the facility where they were done to arrange for  prior to your scheduled appointment.    (2) List of current medications   (3) This referral request   (4) Any documents/labs given to you for this referral                  Who to contact     If you have questions or need follow up information about today's clinic visit or your schedule please contact [facility] directly at [phone].  Normal or non-critical lab and imaging results will be communicated to you by LifeDoxhart, letter or phone within 4 business days after the clinic has received the results. If you do not hear from us within 7 days, please contact the clinic through [employer] or phone. If you have a critical or abnormal lab result, we will notify you by phone as soon as possible.  Submit refill requests through MyWishBoard or call your pharmacy and they will forward the refill request to us. Please allow 3 business days for your refill to be completed.          Additional Information About Your Visit        LifeDoxhart Information     MyWishBoard gives you secure access to your electronic health record. If you see a primary care provider, you can also send messages to your care team and make appointments. If you have questions, please call your primary care clinic.  If you do not have a primary care provider, please call [phone] and they will assist you.        Care EveryWhere ID     This is your Care EveryWhere ID. This could be used by other organizations to access your [place] medical records  [id number]        Your Vitals Were     Pulse Temperature Height Pulse Oximetry BMI (Body Mass Index)       106 98.4  F (36.9  C) (Oral) 5' 1\" (1.549 m) 96% 42.55 kg/m2        Blood Pressure from Last 3 Encounters:   06/26/18 (!) 133/91   06/18/18 116/82   06/08/18 118/80    Weight from Last 3 Encounters:   06/26/18 225 lb 3.2 oz (102.2 kg)   06/18/18 221 lb (100.2 kg) "   06/08/18 222 lb (100.7 kg)              We Performed the Following     PAIN MANAGEMENT REFERRAL        Primary Care Provider Office Phone # Fax #    CIPRIANO Cuevas [facility] [phone] [address]  [place] [address]        Equal Access to Services     [facility] BONNIE : Hadii aad ku hadasho Soomaali, waaxda luqadaha, qaybta kaalmada adeegyada, waxay idiin hayaan adeeg khnorrissh la'aan ah. So [facility] [phone].    ATENCIÓN: Si habla español, tiene a saxena disposición servicios gratuitos de asistencia lingüística. Rozina al [phone].    We comply with applicable federal civil rights laws and [place] laws. We do not discriminate on the basis of race, color, national origin, age, disability, sex, sexual orientation, or gender identity.            Thank you!     Thank you for choosing [facility]  for your care. Our goal is always to provide you with excellent care. Hearing back from our patients is one way we can continue to improve our services. Please take a few minutes to complete the written survey that you may receive in the mail after your visit with us. Thank you!             Your Updated Medication List - Protect others around you: Learn how to safely use, store and throw away your medicines at www.disposemymeds.org.          This list is accurate as of 6/26/18  3:54 PM.  Always use your most recent med list.                   Brand Name Dispense Instructions for use Diagnosis    diclofenac 50 MG EC tablet    VOLTAREN    90 tablet    Take 1 tablet (50 mg) by mouth 3 times daily as needed for moderate pain (take with food)    Lumbar pain with radiation down left leg       IBUPROFEN PO      Take 800 mg by mouth every 6 hours        lidocaine 5 % Patch    LIDODERM    30 patch    Apply up to 3 patches to painful area at once for up to 12 h within a 24 h period.  Remove after 12 hours.    Lumbar pain with radiation down left leg       methocarbamol 750 MG tablet     ROBAXIN    60 tablet    Take 1 tablet (750 mg) by mouth 3 times daily as needed for muscle spasms    Lumbar pain with radiation down left leg       nabumetone 500 MG tablet    RELAFEN    60 tablet    Take 1-2 tablets (500-1,000 mg) by mouth 2 times daily as needed for moderate pain    Lumbosacral disc herniation       NAPROXEN PO           predniSONE 20 MG tablet    DELTASONE    5 tablet    Take 1 tablet (20 mg) by mouth daily    Lumbar radiculopathy, Lumbar disc herniation       TYLENOL PO      Take by mouth 3 times daily

## 2018-06-27 ENCOUNTER — TELEPHONE (OUTPATIENT)
Dept: PALLIATIVE MEDICINE | Facility: CLINIC | Age: 33
End: 2018-06-27

## 2018-06-27 NOTE — TELEPHONE ENCOUNTER
Left voicemail for patient to schedule Lumbar ENIO.          Arely SHI    [facility] [place]

## 2018-06-27 NOTE — TELEPHONE ENCOUNTER
Faxed PA request to WC  w/ supporting documentation. Waiting for response.   RightFax confirmed at 4:37 pm on 6/27        Arely SHI    [facility] [place]

## 2018-06-27 NOTE — TELEPHONE ENCOUNTER
Pre-screening Questions for Radiology Injections:    Injection to be done at which interventional clinic site? [facility]   [facility], instruct patient to arrive 30 minutes before the scheduled appointment time.     Procedure ordered by Sahra Coronado    Procedure ordered? Lumbar Epidural Steroid Injection    What insurance would patient like us to bill for this procedure? Work Comp      Worker's comp or MVA (motor vehicle accident) -Any injection DO NOT SCHEDULE and route to Arely Garvin.      [employer] - For SI joint injections, DO NOT SCHEDULE and route Kaitlyn Vivar. [employer] NO PA REQUIRED EFFECTIVE 11/1/2017      [employer]- MBB's must be scheduled at LEAST two weeks apart      [employer] - Any injection besides hip/shoulder/knee joint DO NOT SCHEDULE and route to Kaitlyn Vivar. She will obtain PA and call pt back to schedule procedure or notify pt of denial.       [employer]-Route to Kaitlyn for review    Any chance of pregnancy? NO   If YES, do NOT schedule and route to RN pool    Is an  needed? No     Patient has a drive home? (mandatory) YES: INFORMED    Is patient taking any blood thinners (plavix, coumadin, jantoven, warfarin, heparin, pradaxa or dabigatran )? No   If hold needed, do NOT schedule, route to RN pool     Is patient taking any aspirin products? No     If more than 325mg/day do NOT schedule; route to RN pool     For CERVICAL procedures, hold all aspirin products for 6 days.      Does the patient have a bleeding or clotting disorder? No     If YES, okay to schedule AND route to RN nurse pool    **For any patients with platelet count <100, must be forwarded to provider**    Is patient diabetic?  No  If YES, have them bring their glucometer.    Does patient have an active infection or treated for one within the past week? No     Is patient currently taking any antibiotics?  No     For patients on chronic, preventative, or  prophylactic antibiotics, procedures may be scheduled.     For patients on antibiotics for active or recent infection:    Radha Ramesh Burton, [place]-antibiotic course must have been completed for 4 days    Is patient currently taking any steroid medications? (i.e. Prednisone, Medrol)  No     For patients on steroid medications:    Radha Ramesh Burton, [place]-steroid course must have been completed for 4 days    Reviewed with patient:  If you are started on any steroids or antibiotics between now and your appointment, you must contact us because it may affect our ability to perform your procedure.  Yes    Is patient actively being treated for cancer or immunocompromised? No  If YES, do NOT schedule and route to RN pool     Are you able to get on and off an exam table with minimal or no assistance? Yes  If NO, do NOT schedule and route to RN pool    Are you able to roll over and lay on your stomach with minimal or no assistance? Yes  If NO, do NOT schedule and route to RN pool     Any allergies to contrast dye, iodine, shellfish, or numbing and steroid medications? No  If YES, route to RN pool AND add allergy information to appointment notes    Allergies: Nkda [no known drug allergies]      Has the patient had a flu shot or any other vaccinations within 7 days before or after the procedure.  No     Does patient have an MRI/CT?  YES: MRI  (SI joint, hip injections, lumbar sympathetic blocks, and stellate ganglion blocks do not require an MRI)    Was the MRI done w/in the last 3 years?  Yes    Was MRI done at [place]? Yes      If not, where was it done? N/A       If MRI was not done at [place], [facility] or [facility] do NOT schedule and route to nursing.  If pt has an imaging disc, the injection may be scheduled but pt has to bring disc to appt. If they show up w/out disc the injection cannot be done    Reminders (please tell patient if applicable):       Instructed pt to arrive 30  minutes early for IV start if this is for a cervical procedure, ALL sympathetic (stellate ganglion, hypogastric, or lumbar sympathetic block) and all sedation procedures (RFA, spinal cord stimulation trials).  Not Applicable   -IVs are not routinely placed for Dr. Whatley cervical cases   -Dr. Aceves: IVs for cervical ESIs and cervical TBDs (not CMBBs/facet inj)      If NPO for sedation, informed patient that it is okay to take medications with sips of water (except if they are to hold blood thinners).  Not Applicable   *DO take blood pressure medication if it is prescribed*      If this is for a cervical ENIO, informed patient that aspirin needs to be held for 6 days.   Not Applicable      For all patients not having spinal cord stimulator (SCS) trials or radiofrequency ablations (RFAs), informed patient:    IV sedation is not provided for this procedure.  If you feel that an oral anti-anxiety medication is needed, you can discuss this further with your referring provider or primary care provider.  The Pain Clinic provider will discuss specifics of what the procedure includes at your appointment.  Most procedures last 10-20 minutes.  We use numbing medications to help with any discomfort during the procedure.  Not Applicable      Do not schedule procedures requiring IV placement in the first appointment of the day or first appointment after lunch. Do NOT schedule at 0745, 0815 or 1245. N/A      For patients 85 or older we recommend having an adult stay w/ them for the remainder of the day.   N/A    Does the patient have any questions?  NO  Anna Real  [facility]

## 2018-06-28 NOTE — TELEPHONE ENCOUNTER
Received FAX authorization approval for LESI.     Patient notified by SALOMON SHI    [facility] [place]

## 2018-07-09 NOTE — PROGRESS NOTES
[facility] - Procedure Note    Date of Service: 7/11/2018    Procedure performed: Left S1 transforaminal epidural steroid injection with fluoroscopic guidance  Diagnosis: Lumbar spondylosis; Lumbar radiculitis/radiculopathy  : Corinne Whatley MD & Karine Cross MD (pain fellow)   Anesthesia: none    Indications: Santos Bagley is a 33 year old female who is seen at the request of Sahra Coronado CNP for lumbar transforaminal epidural steroid injection. The patient describes low back pain radiating into the posterior leg on the left.  Her pain began in December of 2017 after a fall. The patient has been exhibiting symptoms consistent with lumbar intraspinal inflammation and radiculopathy. Symptoms have been persistent, disabling, and intermittently severe. The patient reports minimal improvement with conservative treatment, including PT and medications.    Lumbar MRI was done on 3/22/2018 which showed   FINDINGS: Five lumbar vertebrae are assumed. Degenerative loss of disc  signal with normal disc height is present at L5-S1. Apophyseal joints  appear within normal limits with no periarticular marrow edema or  degenerative spondylolisthesis. Vertebral body heights are normal. The  conus medullaris is unremarkable in appearance on the sagittal images.      L2-L3, L3-L4, L4-L5: Normal.     L5-S1: Annular bulge or broad-based central disc protrusion with  superimposed small caudally dissecting extruded disc fragment within  the left lateral recess measuring approximately 0.5 cm in AP diameter.  This contacts but does not appear to displace the left S1 nerve root.  No central stenosis. Bilateral annular bulging results in moderate  right L5 foraminal stenosis. The left neural foramen appears to be  patent.         IMPRESSION: L5-S1 mild or early degenerative disc disease, left  lateral recess 0.5 cm caudally dissecting disc herniation contacting  but not displacing the left S1 nerve root, and  moderate right L5  foraminal stenosis.    Allergies:      Allergies   Allergen Reactions     Nkda [No Known Drug Allergies]         Vitals:  /87  Pulse 87  SpO2 96%    Review of Systems: The patient denies recent fever, chills, illness, use of antibiotics or anticoagulants. All other 10-point review of systems negative.     Procedure: The procedure and risks were explained, and informed written consent was obtained from the patient. Risks include but are not limited to: infection, bleeding, increased pain, and damage to soft tissue, nerve, muscle, and vasculature structures. After getting informed consent, patient was brought into the procedure suite and was placed in a prone position on the procedure table. A Pause for the Cause was performed. Patient was prepped and draped in sterile fashion.     After identifying the left S1 neuroforamen, the C-arm was rotated to a left lateral oblique angle.  A total of 4.5 mL of Lidocaine 1% was used to anesthetize the skin and the needle track at a skin entry site coaxial with the fluoroscopy beam, and overriding the superior aspect of the neuroforamen.  A 22 gauge 5 inch spinal needle was advanced under intermittent fluoroscopy until it entered the foramen superiorly.    The position was then inspected from anteroposterior and lateral views, and the needle adjusted appropriately.  After negative aspiration, a total of 1 mL of Omnipaque-300 was injected using static and continuous fluoroscopy confirming appropriate position, with spread along the nerve root sheath and into the epidural space, with no intravascular or intrathecal uptake. 9 mL of Omnipaque-300 was wasted.    2mL of 1% lidocaine with 20 mg of dexamethasone was injected.  The needle was removed. Hemostasis was achieved, the area was cleaned, and bandaids were placed when appropriate. Images were saved to PACS.    The patient tolerated the procedure well, and was taken to the recovery room, and there was no  evidence of procedural complications. No new sensory or motor deficits were noted following the procedure. The patient was stable and able to ambulate on discharge home. Post-procedure instructions were provided.     Pre-procedure pain score: 2/10 in the back, 2/10 in the leg  Post-procedure pain score: 0/10 in the back, 0/10 in the leg    Assessment/Plan: Santos Bagley is a 33 year old female s/p Left S1 transforaminal epidural steroid injection today for lumbar spondylosis, radiculitis/radiculopathy.     1. Following today's procedure, the patient was advised to contact the [facility] for any of the following:   Fever, chills, or night sweats   New onset of pain, numbness, or weakness   Any questions/concerns regarding the procedure  If unable to contact the Pain Center, the patient was instructed to go to a local Emergency Room for any complications.   2. The patient will receive a follow-up call in 1 week.  3. The patient should follow-up with the referring provider in 2 weeks for post-procedure evaluation.      Corinne Whatley MD   [facility]

## 2018-07-11 ENCOUNTER — RADIOLOGY INJECTION OFFICE VISIT (OUTPATIENT)
Dept: PALLIATIVE MEDICINE | Facility: CLINIC | Age: 33
End: 2018-07-11
Attending: NURSE PRACTITIONER
Payer: OTHER MISCELLANEOUS

## 2018-07-11 ENCOUNTER — RADIANT APPOINTMENT (OUTPATIENT)
Dept: GENERAL RADIOLOGY | Facility: CLINIC | Age: 33
End: 2018-07-11
Attending: ANESTHESIOLOGY
Payer: OTHER MISCELLANEOUS

## 2018-07-11 VITALS — DIASTOLIC BLOOD PRESSURE: 90 MMHG | HEART RATE: 96 BPM | OXYGEN SATURATION: 97 % | SYSTOLIC BLOOD PRESSURE: 141 MMHG

## 2018-07-11 DIAGNOSIS — M54.16 LUMBAR RADICULOPATHY: ICD-10-CM

## 2018-07-11 DIAGNOSIS — M54.16 LUMBAR RADICULOPATHY: Primary | ICD-10-CM

## 2018-07-11 PROCEDURE — 64483 NJX AA&/STRD TFRM EPI L/S 1: CPT | Mod: LT | Performed by: ANESTHESIOLOGY

## 2018-07-11 NOTE — PATIENT INSTRUCTIONS
[facility] Procedure Discharge Instructions    Today you saw:   Dr. Corinne Whatley           Your procedure: Lumbar Epidural steroid injection       Medications used:  Lidocaine (anesthetic)   Dexamethasone (steroid)   Omnipaque (contrast)           Be cautious when walking as numbness and/or weakness in the legs may occur up to 6-8 hours after the procedure due to effect of the local anesthetic    Do not drive for 6 hours. The effect of the local anesthetic could slow your reflexes.     Avoid strenuous activity for the first 24 hours. You may resume your regular activities after that.     You may shower, however avoid swimming, tub baths or hot tubs for 24 hours following your procedure    You may have a mild to moderate increase in pain for several days following the injection.      You may use ice packs for 10-15 minutes, 3 to 4 times a day at the injection site for comfort    Do not use heat to painful areas for 6 to 8 hours. This will give the local anesthetic time to wear off and prevent you from accidentally burning your skin.    You may use anti-inflammatory medications (such as Ibuprofen/Advil or Aleve) or Tylenol for pain control if necessary    With diabetes, check your blood sugar more frequently than usual as your blood sugar may be higher than normal for 10-14 days following a steroid injection. Contact your doctor who manages your diabetes if your blood sugar is higher than usual    It may take up to 14 days for the steroid medication to start working although you may feel the effect as early as a few days after the procedure.     Follow up with your referring provider in 2-3 weeks      If you experience any of the following, call the pain center line during work hours at [phone] or on-call physician after hours at [phone]:  -Fever over 100 degree F  -Swelling, bleeding, redness, drainage, warmth at the injection site  -Progressive weakness or numbness in your legs or arms  -Loss of  bowel or bladder function  -Unusual headache that is not relieved by Tylenol or your regular headache medication  -Unusual new onset of pain that is not improving    Phone #s:  Nurse triage line for general questions: [phone]

## 2018-07-11 NOTE — NURSING NOTE
Discharge Information    IV Discontiued Time:  NA    Amount of Fluid Infused:  NA    Discharge Criteria = When patient returns to baseline or as per MD order    Consciousness:  Pt is fully awake    Circulation:  BP +/- 20% of pre-procedure level    Respiration:  Patient is able to breathe deeply    O2 Sat:  Patient is able to maintain O2 Sat >92% on room air    Activity:  Moves 4 extremities on command    Ambulation:  Patient is able to stand and walk or stand and pivot into wheelchair    Dressing:  Clean/dry or No Dressing    Notes:   Discharge instructions and AVS given to patient    Patient meets criteria for discharge?  YES    Admitted to PCU?  No    Responsible adult present to accompany patient home?  Yes    Signature/Title:    Alyse Krueger RN Care Coordinator  [facility] [place]

## 2018-07-25 PROBLEM — M54.16 LUMBAR RADICULOPATHY: Status: RESOLVED | Noted: 2018-03-29 | Resolved: 2018-06-25

## 2018-08-16 ENCOUNTER — OFFICE VISIT (OUTPATIENT)
Dept: NEUROSURGERY | Facility: CLINIC | Age: 33
End: 2018-08-16
Payer: COMMERCIAL

## 2018-08-16 VITALS
DIASTOLIC BLOOD PRESSURE: 104 MMHG | HEART RATE: 107 BPM | OXYGEN SATURATION: 96 % | TEMPERATURE: 98.2 F | SYSTOLIC BLOOD PRESSURE: 154 MMHG

## 2018-08-16 DIAGNOSIS — M54.16 LUMBAR RADICULOPATHY: Primary | ICD-10-CM

## 2018-08-16 PROCEDURE — 99213 OFFICE O/P EST LOW 20 MIN: CPT | Performed by: NURSE PRACTITIONER

## 2018-08-16 ASSESSMENT — PAIN SCALES - GENERAL: PAINLEVEL: MILD PAIN (3)

## 2018-08-16 NOTE — LETTER
8/16/2018         RE: Santos Bagley  [address] [place] [id number]        Dear Colleague,    Thank you for referring your patient, Santos Bagley, to the [facility]. Please see a copy of my visit note below.    [facility]  Neurosurgery followup:    HPI: Santos Bagley is a 33 year old female with a h/o LLE pain resulting after a work incident on 12/20/17.  She was seen at our office on 6/26/18 and met with Dr. Tomlin to discuss surgery vs repeat lumbar ENIO.  At the time the patient was not interested in pursing a lumbar microdiscectomy and had a repeat lumbar ENIO.  She states the pain continues without any relief after the injection.  She describes a constant pain to the left buttock and along the left posterior leg.  It increases with sitting for prolonged periods of time.  She has increased pain with bending and lifting as well.  She denies falls, weakness or changes to bowel or bladder.     Exam:  Constitutional:  Alert, well nourished, NAD.  HEENT: Normocephalic, atraumatic.   Pulm:  Without shortness of breath   CV:  No pitting edema of BLE.      Neurological:  Awake  Alert  Oriented x 3  Motor exam:        IP Q DF PF EHL  R   5  5   5   5    5  L   5  5   5   5    5     Able to spontaneously move L/E bilaterally  Sensation intact throughout all L/E dermatomes     Imaging: MRI shows left L5-S1 disc herniation    A/P:   Lumbar Radiculopathy  Lumbar DDD  Santos Bagley is a 33 year old female with a h/o LLE pain resulting after a work incident on 12/20/17.  She was seen at our office on 6/26/18 and met with Dr. Tomlin to discuss surgery vs repeat lumbar ENIO.  At the time the patient was not interested in pursing a lumbar microdiscectomy and had a repeat lumbar ENIO.  She states the pain continues without any relief after the injection.  She describes a constant pain to the left buttock and along the left posterior leg.  It increases with sitting for prolonged periods of time.  She has  "increased pain with bending and lifting as well.  She denies falls, weakness or changes to bowel or bladder.  The patient is unsure if she wants to consider surgery, \"But I want to do something.\"  It is almost 6 months since her last MRI and if thinking about surgery we discussed getting an updated MRI.  She will schedule it at her convenience and we will contact her with results or she can schedule back to discuss with Dr. Tomlin specifically.         Patient Instructions   -Schedule lumbar MRI  -We will call you with results  -Please contact the clinic if pain persists at [phone].  Patient to follow up with Primary Care provider regarding elevated blood pressure.          Sahra Coronado CNP  [facility]  [address] [address]    Tel [phone]  Pager [phone]        Again, thank you for allowing me to participate in the care of your patient.        Sincerely,        Sahra Coronado, NP    "

## 2018-08-16 NOTE — PROGRESS NOTES
"[facility]  Neurosurgery followup:    HPI: Santos Bagley is a 33 year old female with a h/o LLE pain resulting after a work incident on 12/20/17.  She was seen at our office on 6/26/18 and met with Dr. Tomlin to discuss surgery vs repeat lumbar ENIO.  At the time the patient was not interested in pursing a lumbar microdiscectomy and had a repeat lumbar ENIO.  She states the pain continues without any relief after the injection.  She describes a constant pain to the left buttock and along the left posterior leg.  It increases with sitting for prolonged periods of time.  She has increased pain with bending and lifting as well.  She denies falls, weakness or changes to bowel or bladder.     Exam:  Constitutional:  Alert, well nourished, NAD.  HEENT: Normocephalic, atraumatic.   Pulm:  Without shortness of breath   CV:  No pitting edema of BLE.      Neurological:  Awake  Alert  Oriented x 3  Motor exam:        IP Q DF PF EHL  R   5  5   5   5    5  L   5  5   5   5    5     Able to spontaneously move L/E bilaterally  Sensation intact throughout all L/E dermatomes     Imaging: MRI shows left L5-S1 disc herniation    A/P:   Lumbar Radiculopathy  Lumbar DDD  Santos Bagley is a 33 year old female with a h/o LLE pain resulting after a work incident on 12/20/17.  She was seen at our office on 6/26/18 and met with Dr. Tomlin to discuss surgery vs repeat lumbar ENIO.  At the time the patient was not interested in pursing a lumbar microdiscectomy and had a repeat lumbar ENIO.  She states the pain continues without any relief after the injection.  She describes a constant pain to the left buttock and along the left posterior leg.  It increases with sitting for prolonged periods of time.  She has increased pain with bending and lifting as well.  She denies falls, weakness or changes to bowel or bladder.  The patient is unsure if she wants to consider surgery, \"But I want to do something.\"  It is almost 6 months since her last " MRI and if thinking about surgery we discussed getting an updated MRI.  She will schedule it at her convenience and we will contact her with results or she can schedule back to discuss with Dr. Tomlin specifically.         Patient Instructions   -Schedule lumbar MRI  -We will call you with results  -Please contact the clinic if pain persists at [phone].  Patient to follow up with Primary Care provider regarding elevated blood pressure.          Sahra Coronado [facility]  [facility]  [address] [address]    Tel [phone]  Pager [phone]

## 2018-08-16 NOTE — MR AVS SNAPSHOT
After Visit Summary   8/16/2018    Santos Bagley    MRN: [id number]           Patient Information     Date Of Birth          1985        Visit Information        Provider Department      8/16/2018 12:00 PM Sahra Coronado NP [facility]        Today's Diagnoses     Lumbar radiculopathy    -  1      Care Instructions    -Schedule lumbar MRI  -We will call you with results  -Please contact the clinic if pain persists at [phone].            Follow-ups after your visit        Future tests that were ordered for you today     Open Future Orders        Priority Expected Expires Ordered    MR Lumbar Spine w/o Contrast Routine  8/16/2019 8/16/2018            Who to contact     If you have questions or need follow up information about today's clinic visit or your schedule please contact [facility] directly at [phone].  Normal or non-critical lab and imaging results will be communicated to you by [employer], letter or phone within 4 business days after the clinic has received the results. If you do not hear from us within 7 days, please contact the clinic through [employer] or phone. If you have a critical or abnormal lab result, we will notify you by phone as soon as possible.  Submit refill requests through [employer] or call your pharmacy and they will forward the refill request to us. Please allow 3 business days for your refill to be completed.          Additional Information About Your Visit        MyChart Information     [employer] gives you secure access to your electronic health record. If you see a primary care provider, you can also send messages to your care team and make appointments. If you have questions, please call your primary care clinic.  If you do not have a primary care provider, please call [phone] and they will assist you.        Care EveryWhere ID     This is your Care EveryWhere ID. This could be used by other organizations to access your [place]  medical records  [id number]        Your Vitals Were     Pulse Temperature Pulse Oximetry Breastfeeding?          107 98.2  F (36.8  C) (Oral) 96% No         Blood Pressure from Last 3 Encounters:   08/16/18 (!) 154/104   07/11/18 141/90   06/26/18 (!) 133/91    Weight from Last 3 Encounters:   06/26/18 225 lb 3.2 oz (102.2 kg)   06/18/18 221 lb (100.2 kg)   06/08/18 222 lb (100.7 kg)               Primary Care Provider Office Phone # Fax #    Gina Saunders CIPRIANO [facility] [phone] [address] [facility] [address]        Equal Access to Services     NI NICOLE : Hadii aad ku hadasho Soomaali, waaxda luqadaha, qaybta kaalmada adeegyada, waxmark madera . So [facility] [phone].    ATENCIÓN: Si habla español, tiene a saxena disposición servicios gratuitos de asistencia lingüística. [facility] [phone].    We comply with applicable federal civil rights laws and [place] laws. We do not discriminate on the basis of race, color, national origin, age, disability, sex, sexual orientation, or gender identity.            Thank you!     Thank you for choosing [facility]  for your care. Our goal is always to provide you with excellent care. Hearing back from our patients is one way we can continue to improve our services. Please take a few minutes to complete the written survey that you may receive in the mail after your visit with us. Thank you!             Your Updated Medication List - Protect others around you: Learn how to safely use, store and throw away your medicines at www.disposemymeds.org.          This list is accurate as of 8/16/18 12:16 PM.  Always use your most recent med list.                   Brand Name Dispense Instructions for use Diagnosis    diclofenac 50 MG EC tablet    VOLTAREN    90 tablet    Take 1 tablet (50 mg) by mouth 3 times daily as needed for moderate pain (take with food)    Lumbar pain with radiation down left leg        IBUPROFEN PO      Take 800 mg by mouth every 6 hours        lidocaine 5 % Patch    LIDODERM    30 patch    Apply up to 3 patches to painful area at once for up to 12 h within a 24 h period.  Remove after 12 hours.    Lumbar pain with radiation down left leg       methocarbamol 750 MG tablet    ROBAXIN    60 tablet    Take 1 tablet (750 mg) by mouth 3 times daily as needed for muscle spasms    Lumbar pain with radiation down left leg       nabumetone 500 MG tablet    RELAFEN    60 tablet    Take 1-2 tablets (500-1,000 mg) by mouth 2 times daily as needed for moderate pain    Lumbosacral disc herniation       NAPROXEN PO           predniSONE 20 MG tablet    DELTASONE    5 tablet    Take 1 tablet (20 mg) by mouth daily    Lumbar radiculopathy, Lumbar disc herniation       TYLENOL PO      Take by mouth 3 times daily

## 2018-08-16 NOTE — NURSING NOTE
"Santos Bagley is a 33 year old female who presents for:  Chief Complaint   Patient presents with     Neurologic Problem     follow up lumbar radiculopathy, failed injection        Vitals:    There were no vitals filed for this visit.    BMI:  Estimated body mass index is 42.55 kg/(m^2) as calculated from the following:    Height as of 6/26/18: 5' 1\" (1.549 m).    Weight as of 6/26/18: 225 lb 3.2 oz (102.2 kg).    Pain Score:  Data Unavailable        Jennifer Tyler CMA, DONALDO    Santos left before 2nd blood pressure was done.  "

## 2018-08-16 NOTE — PATIENT INSTRUCTIONS
-Schedule lumbar MRI  -We will call you with results  -Please contact the clinic if pain persists at [phone].  Patient to follow up with Primary Care provider regarding elevated blood pressure.

## 2018-09-10 ENCOUNTER — RADIANT APPOINTMENT (OUTPATIENT)
Dept: MRI IMAGING | Facility: CLINIC | Age: 33
End: 2018-09-10
Attending: NURSE PRACTITIONER
Payer: OTHER MISCELLANEOUS

## 2018-09-10 ENCOUNTER — MYC MEDICAL ADVICE (OUTPATIENT)
Dept: ORTHOPEDICS | Facility: CLINIC | Age: 33
End: 2018-09-10

## 2018-09-10 DIAGNOSIS — M54.16 LUMBAR RADICULOPATHY: ICD-10-CM

## 2018-09-10 PROCEDURE — 72148 MRI LUMBAR SPINE W/O DYE: CPT | Mod: TC

## 2018-09-13 ENCOUNTER — TELEPHONE (OUTPATIENT)
Dept: NEUROSURGERY | Facility: CLINIC | Age: 33
End: 2018-09-13

## 2018-09-13 NOTE — TELEPHONE ENCOUNTER
DARCIE for pt to review MRI results.  If pt returns call let her know MRI was similar to previous.  If pain is severe with difficulty with walking, and she wants to consider surgery should schedule with Dr. Tomlin to discuss.

## 2018-09-18 ENCOUNTER — OFFICE VISIT (OUTPATIENT)
Dept: NEUROSURGERY | Facility: CLINIC | Age: 33
End: 2018-09-18
Payer: OTHER MISCELLANEOUS

## 2018-09-18 VITALS
OXYGEN SATURATION: 93 % | SYSTOLIC BLOOD PRESSURE: 142 MMHG | WEIGHT: 226.6 LBS | DIASTOLIC BLOOD PRESSURE: 95 MMHG | HEART RATE: 106 BPM | HEIGHT: 61 IN | BODY MASS INDEX: 42.78 KG/M2 | TEMPERATURE: 98.1 F

## 2018-09-18 DIAGNOSIS — M54.16 LUMBAR RADICULOPATHY: Primary | ICD-10-CM

## 2018-09-18 DIAGNOSIS — E66.01 MORBID OBESITY (H): ICD-10-CM

## 2018-09-18 PROCEDURE — 99214 OFFICE O/P EST MOD 30 MIN: CPT | Performed by: NEUROLOGICAL SURGERY

## 2018-09-18 ASSESSMENT — PAIN SCALES - GENERAL: PAINLEVEL: SEVERE PAIN (7)

## 2018-09-18 NOTE — PATIENT INSTRUCTIONS
Patient Instructions  Pre-Operative:  -Surgery scheduled at [facility] for MIS left L5-S1 Microdiscectomy  -Surgical risks: blood clots in the leg or lung, problems urinating, nerve damage, drainage from the incision, infection,stiffness  - Pre-operative physical with primary care physician within 30 days of surgical date.   - Likely same day procedure with discharge home day of surgery, may stay for 23 hour observation hospitalization for monitoring.     -Shower procedure: please shower with antimicrobial soap the night before surgery and morning of surgery. Please refer to showering instruction sheet in folder.  -Stop all solid foods 8 hours before surgery.  -Keep drinking clear liquids until 4 hours before surgery. Clear liquids include water, clear juice, black coffee, or clear tea without milk, Gatorade, clear soda.   - Discontinue Aspirin, NSAIDs (Advil/Ibuprofen, Naproxen,Nuprin,Relafen/Nabumetone, Diclofenac,Meloxicam, Aleve, Celebrex) x 7 days prior to surgical date.  - May try tylenol for pain 1000 mg three times per day for pain      Post-Operative:  -you may resume taking NSAIDs 7 days post op   - Post operative incisional pain x 1-2 weeks which will require pain medications and muscle relaxants. You will receive medication upon discharge.  -Do NOT drive while taking narcotic pain medication.  -Do not drink alcohol while using any pain medication  -Post operative incision care- Watch for signs of infection: redness, swelling, warmth, drainage, and fever of 101 degrees or higher. Notify clinic [phone].  -No submerging incision in water such as pools, hot tubs,baths for at least 8 weeks or until incision is healed. Showers are fine.   - Post operative activity limitations: 6-8 weeks, no lifting > 10 pounds, no bending, twisting, or overhead reaching.  - Post op follow up appointments:6 week post op follow up visit with Nurse practitioner.Please call to schedule follow up appointment at  [phone].  -If you are currently employed, you will need to be off work for 2-4 weeks for post op recovery and healing.       Patient to follow up with Primary Care provider regarding elevated blood pressure.

## 2018-09-18 NOTE — PROGRESS NOTES
Pre-operative Education    Education included but not limited to:  - Pre-operative physical with primary care physician within 30 days of surgical date.   - Pre-operative clearance (cardiology, hematology, etc).   - Discontinue Aspirin, NSAIDs, Diclofenac x 7 days prior to surgical date.   -  -May try tylenol for pain 1000 mg three times per day for pain  -you may resume taking NSAIDs 7 days post op after Laminectomy, microdiscectomy   -Patient is not a smoker  -Forms to be completed  -Surgical risks: blood clots in the leg or lung, problems urinating, nerve damage, drainage from the incision, infection,stiffness  -Preparation timeline  - When to start NPO           -Special bathing procedure.Patient received Hibiclens and showering instruction sheet.   - Likely same day procedure with discharge home day of surgery, may stay for 23 hour observation hospitalization for monitoring  - Post operative incisional pain x 1-2 weeks which will require pain medications and muscle relaxants.   -Do NOT drive or drink alcohol while taking narcotic pain medication.  -Post operative incision care-Keep your incision clean and dry at all times. OK to remove dressing on postop day 2. OK to shower on postop day 3 and allow water to run over incision, pat dry after shower. No bathing, swimming or submerging in water. Call immediately or come to ED if any drainage occurs, or you develop new pain. Watch for signs of infection: redness, swelling, warmth, drainage, and fever of 101 degrees or higher. Notify clinic.   - Post operative activity limitations: 6-8 weeks, no lifting > 10 pounds, no bending, twisting, or overhead reaching.  - Post op follow up appointments: 6 weeks and 3 months with Nurse Practitioner. Please call to schedule follow up appointment at [phone].   - Spine Education book was also given to the patient for further review.      Patient verbalized understanding of above instructions. All questions were answered to  the best of my ability and the patient's satisfaction. Patient advised to call with any additional questions or concerns.  Elin Anthony RN

## 2018-09-18 NOTE — NURSING NOTE
"Santos Baglye is a 33 year old female who presents for:  Chief Complaint   Patient presents with     Neurologic Problem     LBP and LLE pain s/p W/C injury on 12/20/17 when she slipped on ice. MRI 9/10/18. Xray 1/15/18. Last injection: 7/11/18. Injection only helped for a couple of days. She is here to discuss possible microdiscectomy.         Vitals:    Vitals:    09/18/18 1427   BP: (!) 142/95   Pulse: 106   Temp: 98.1  F (36.7  C)   TempSrc: Oral   SpO2: 93%   Weight: 226 lb 9.6 oz (102.8 kg)   Height: 5' 1\" (1.549 m)       BMI:  Estimated body mass index is 42.82 kg/(m^2) as calculated from the following:    Height as of this encounter: 5' 1\" (1.549 m).    Weight as of this encounter: 226 lb 9.6 oz (102.8 kg).    Pain Score:  Severe Pain (7)        Estella Kurtz      "

## 2018-09-18 NOTE — PROGRESS NOTES
Neurosurgery Follow Up Clinic Visit      Santos Bagley returns for follow up visit regarding her left lower extremity pain    Currently the patient describes having seemed to progress in intensity.  She has pain with sitting and says her pain is somewhat improved with standing.  She takes ibuprofen for the pain and feels like she is not getting much relief.  She describes the pain in the left S1 distribution.    Exam is stable    We reviewed the MRI of her lumbar spine once again that she is noted to have left L5-S1 bulging disc with compression of the left S1 root the disc is paracentral is also off to the right but does not appear to compress the root on the right      Plan:   I recommended a left L5-S1 minimally invasive microdiscectomy. I discussed with the patient the risk of surgery to include, but, not be limited to; nerve injury, failure of improvement of symptoms, CSF leak, infection, post op hematoma, the need for recurrent surgery, paralysis, coma and death.

## 2018-09-18 NOTE — LETTER
9/18/2018         RE: Santos Bagley  [address] [place] [id number]        Dear Colleague,    Thank you for referring your patient, Santos Bagley, to the [facility]. Please see a copy of my visit note below.    Neurosurgery Follow Up Clinic Visit      Santos Bagley returns for follow up visit regarding her left lower extremity pain    Currently the patient describes having seemed to progress in intensity.  She has pain with sitting and says her pain is somewhat improved with standing.  She takes ibuprofen for the pain and feels like she is not getting much relief.  She describes the pain in the left S1 distribution.    Exam is stable    We reviewed the MRI of her lumbar spine once again that she is noted to have left L5-S1 bulging disc with compression of the left S1 root the disc is paracentral is also off to the right but does not appear to compress the root on the right      Plan:   I recommended a left L5-S1 minimally invasive microdiscectomy. I discussed with the patient the risk of surgery to include, but, not be limited to; nerve injury, failure of improvement of symptoms, CSF leak, infection, post op hematoma, the need for recurrent surgery, paralysis, coma and death.             Pre-operative Education    Education included but not limited to:  - Pre-operative physical with primary care physician within 30 days of surgical date.   - Pre-operative clearance (cardiology, hematology, etc).   - Discontinue Aspirin, NSAIDs, Diclofenac x 7 days prior to surgical date.   -  -May try tylenol for pain 1000 mg three times per day for pain  -you may resume taking NSAIDs 7 days post op after Laminectomy, microdiscectomy   -Patient is not a smoker  -Forms to be completed  -Surgical risks: blood clots in the leg or lung, problems urinating, nerve damage, drainage from the incision, infection,stiffness  -Preparation timeline  - When to start NPO           -Special bathing procedure.Patient received  Hibiclens and showering instruction sheet.   - Likely same day procedure with discharge home day of surgery, may stay for 23 hour observation hospitalization for monitoring  - Post operative incisional pain x 1-2 weeks which will require pain medications and muscle relaxants.   -Do NOT drive or drink alcohol while taking narcotic pain medication.  -Post operative incision care-Keep your incision clean and dry at all times. OK to remove dressing on postop day 2. OK to shower on postop day 3 and allow water to run over incision, pat dry after shower. No bathing, swimming or submerging in water. Call immediately or come to ED if any drainage occurs, or you develop new pain. Watch for signs of infection: redness, swelling, warmth, drainage, and fever of 101 degrees or higher. Notify clinic.   - Post operative activity limitations: 6-8 weeks, no lifting > 10 pounds, no bending, twisting, or overhead reaching.  - Post op follow up appointments: 6 weeks and 3 months with Nurse Practitioner. Please call to schedule follow up appointment at [phone].   - Spine Education book was also given to the patient for further review.      Patient verbalized understanding of above instructions. All questions were answered to the best of my ability and the patient's satisfaction. Patient advised to call with any additional questions or concerns.  Elin Anthony RN           Again, thank you for allowing me to participate in the care of your patient.        Sincerely,        Johnson Tomlin MD

## 2018-09-18 NOTE — MR AVS SNAPSHOT
After Visit Summary   9/18/2018    Santos Bagley    MRN: [id number]           Patient Information     Date Of Birth          1985        Visit Information        Provider Department      9/18/2018 2:20 PM Johnson Tomlin MD [facility] Instructions    Patient Instructions  Pre-Operative:  -Surgery scheduled at [facility] for MIS left L5-S1 Microdiscectomy  -Surgical risks: blood clots in the leg or lung, problems urinating, nerve damage, drainage from the incision, infection,stiffness  - Pre-operative physical with primary care physician within 30 days of surgical date.   - Likely same day procedure with discharge home day of surgery, may stay for 23 hour observation hospitalization for monitoring.     -Shower procedure: please shower with antimicrobial soap the night before surgery and morning of surgery. Please refer to showering instruction sheet in folder.  -Stop all solid foods 8 hours before surgery.  -Keep drinking clear liquids until 4 hours before surgery. Clear liquids include water, clear juice, black coffee, or clear tea without milk, Gatorade, clear soda.   - Discontinue Aspirin, NSAIDs (Advil/Ibuprofen, Naproxen,Nuprin,Relafen/Nabumetone, Diclofenac,Meloxicam, Aleve, Celebrex) x 7 days prior to surgical date.  - May try tylenol for pain 1000 mg three times per day for pain      Post-Operative:  -you may resume taking NSAIDs 7 days post op   - Post operative incisional pain x 1-2 weeks which will require pain medications and muscle relaxants. You will receive medication upon discharge.  -Do NOT drive while taking narcotic pain medication.  -Do not drink alcohol while using any pain medication  -Post operative incision care- Watch for signs of infection: redness, swelling, warmth, drainage, and fever of 101 degrees or higher. Notify clinic [phone].  -No submerging incision in water such as pools, hot tubs,baths for at least 8 weeks or  until incision is healed. Showers are fine.   - Post operative activity limitations: 6-8 weeks, no lifting > 10 pounds, no bending, twisting, or overhead reaching.  - Post op follow up appointments:6 week post op follow up visit with Nurse practitioner.Please call to schedule follow up appointment at [phone].  -If you are currently employed, you will need to be off work for 2-4 weeks for post op recovery and healing.       Patient to follow up with Primary Care provider regarding elevated blood pressure.            Follow-ups after your visit        Who to contact     If you have questions or need follow up information about today's clinic visit or your schedule please contact [facility] directly at [phone].  Normal or non-critical lab and imaging results will be communicated to you by [employer], letter or phone within 4 business days after the clinic has received the results. If you do not hear from us within 7 days, please contact the clinic through [employer] or phone. If you have a critical or abnormal lab result, we will notify you by phone as soon as possible.  Submit refill requests through [employer] or call your pharmacy and they will forward the refill request to us. Please allow 3 business days for your refill to be completed.          Additional Information About Your Visit        [employer] Information     [employer] gives you secure access to your electronic health record. If you see a primary care provider, you can also send messages to your care team and make appointments. If you have questions, please call your primary care clinic.  If you do not have a primary care provider, please call [phone] and they will assist you.        Care EveryWhere ID     This is your Care EveryWhere ID. This could be used by other organizations to access your [place] medical records  [id number]        Your Vitals Were     Pulse Temperature Height Pulse Oximetry BMI (Body Mass Index)       106 98.1  F  "(36.7  C) (Oral) 5' 1\" (1.549 m) 93% 42.82 kg/m2        Blood Pressure from Last 3 Encounters:   09/18/18 (!) 142/95   08/16/18 (!) 154/104   07/11/18 141/90    Weight from Last 3 Encounters:   09/18/18 226 lb 9.6 oz (102.8 kg)   06/26/18 225 lb 3.2 oz (102.2 kg)   06/18/18 221 lb (100.2 kg)              Today, you had the following     No orders found for display       Primary Care Provider Office Phone # Fax #    Gina Saunders Marty Nolan, CIPRIANO [facility] [phone] [address] [facility] [address]        Equal Access to Services     NI NICOLE : Hadii adrianne escobaro Soomaali, waaxda luqadaha, qaybta kaalmada adeegyada, altagracia madera . So [facility] [phone].    ATENCIÓN: Si habla español, tiene a saxena disposición servicios gratuitos de asistencia lingüística. Llame al [phone].    We comply with applicable federal civil rights laws and [place] laws. We do not discriminate on the basis of race, color, national origin, age, disability, sex, sexual orientation, or gender identity.            Thank you!     Thank you for choosing [facility]  for your care. Our goal is always to provide you with excellent care. Hearing back from our patients is one way we can continue to improve our services. Please take a few minutes to complete the written survey that you may receive in the mail after your visit with us. Thank you!             Your Updated Medication List - Protect others around you: Learn how to safely use, store and throw away your medicines at www.disposemymeds.org.          This list is accurate as of 9/18/18  3:12 PM.  Always use your most recent med list.                   Brand Name Dispense Instructions for use Diagnosis    diclofenac 50 MG EC tablet    VOLTAREN    90 tablet    Take 1 tablet (50 mg) by mouth 3 times daily as needed for moderate pain (take with food)    Lumbar pain with radiation down left leg       IBUPROFEN PO      Take 800 mg " by mouth every 6 hours        lidocaine 5 % Patch    LIDODERM    30 patch    Apply up to 3 patches to painful area at once for up to 12 h within a 24 h period.  Remove after 12 hours.    Lumbar pain with radiation down left leg       methocarbamol 750 MG tablet    ROBAXIN    60 tablet    Take 1 tablet (750 mg) by mouth 3 times daily as needed for muscle spasms    Lumbar pain with radiation down left leg       nabumetone 500 MG tablet    RELAFEN    60 tablet    Take 1-2 tablets (500-1,000 mg) by mouth 2 times daily as needed for moderate pain    Lumbosacral disc herniation       NAPROXEN PO           predniSONE 20 MG tablet    DELTASONE    5 tablet    Take 1 tablet (20 mg) by mouth daily    Lumbar radiculopathy, Lumbar disc herniation       TYLENOL PO      Take by mouth 3 times daily

## 2018-09-19 ENCOUNTER — TELEPHONE (OUTPATIENT)
Dept: NEUROSURGERY | Facility: CLINIC | Age: 33
End: 2018-09-19

## 2018-09-19 NOTE — TELEPHONE ENCOUNTER
REASON FOR CALL:  Called and left voicemail for patient to call back to schedule surgery.    Detailed message can be left:  YES

## 2018-09-24 ENCOUNTER — HOSPITAL ENCOUNTER (OUTPATIENT)
Facility: CLINIC | Age: 33
End: 2018-09-24
Attending: NEUROLOGICAL SURGERY | Admitting: NEUROLOGICAL SURGERY
Payer: OTHER MISCELLANEOUS

## 2018-09-24 NOTE — TELEPHONE ENCOUNTER
Type of surgery: Left L5-S1 minimally invasive microdiscectomy  Location of surgery: [facility] OR  Date and time of surgery: 10/15/2018  Surgeon: Dr. Tomlin  Pre-Op Appt Date: TBD. Discussed.  Post-Op Appt Date: 11/27/2018. [place] location.   Packet sent out: Yes  Pre-cert/Authorization completed:  Yes  Date: TBD

## 2018-09-25 ENCOUNTER — OFFICE VISIT (OUTPATIENT)
Dept: FAMILY MEDICINE | Facility: CLINIC | Age: 33
End: 2018-09-25
Payer: OTHER MISCELLANEOUS

## 2018-09-25 VITALS
SYSTOLIC BLOOD PRESSURE: 131 MMHG | TEMPERATURE: 98.5 F | HEART RATE: 99 BPM | OXYGEN SATURATION: 97 % | DIASTOLIC BLOOD PRESSURE: 86 MMHG | RESPIRATION RATE: 20 BRPM | WEIGHT: 225.8 LBS | BODY MASS INDEX: 42.66 KG/M2

## 2018-09-25 DIAGNOSIS — E66.09 CLASS 2 OBESITY DUE TO EXCESS CALORIES WITHOUT SERIOUS COMORBIDITY WITH BODY MASS INDEX (BMI) OF 35.0 TO 35.9 IN ADULT: ICD-10-CM

## 2018-09-25 DIAGNOSIS — R73.01 IMPAIRED FASTING GLUCOSE: ICD-10-CM

## 2018-09-25 DIAGNOSIS — M51.27 HERNIATION OF INTERVERTEBRAL DISC BETWEEN L5 AND S1: ICD-10-CM

## 2018-09-25 DIAGNOSIS — Z01.818 PREOP GENERAL PHYSICAL EXAM: Primary | ICD-10-CM

## 2018-09-25 DIAGNOSIS — Z13.220 LIPID SCREENING: ICD-10-CM

## 2018-09-25 DIAGNOSIS — E78.5 HYPERLIPIDEMIA LDL GOAL <160: ICD-10-CM

## 2018-09-25 DIAGNOSIS — Z11.4 SCREENING FOR HUMAN IMMUNODEFICIENCY VIRUS: ICD-10-CM

## 2018-09-25 DIAGNOSIS — E66.812 CLASS 2 OBESITY DUE TO EXCESS CALORIES WITHOUT SERIOUS COMORBIDITY WITH BODY MASS INDEX (BMI) OF 35.0 TO 35.9 IN ADULT: ICD-10-CM

## 2018-09-25 LAB
BASOPHILS # BLD AUTO: 0 10E9/L (ref 0–0.2)
BASOPHILS NFR BLD AUTO: 0.3 %
BETA HCG QUAL IFA URINE: NEGATIVE
CHOLEST SERPL-MCNC: 253 MG/DL
DIFFERENTIAL METHOD BLD: NORMAL
EOSINOPHIL # BLD AUTO: 0.1 10E9/L (ref 0–0.7)
EOSINOPHIL NFR BLD AUTO: 1.4 %
ERYTHROCYTE [DISTWIDTH] IN BLOOD BY AUTOMATED COUNT: 12.5 % (ref 10–15)
HBA1C MFR BLD: 6.5 % (ref 0–5.6)
HCT VFR BLD AUTO: 45.5 % (ref 35–47)
HDLC SERPL-MCNC: 33 MG/DL
HGB BLD-MCNC: 15.3 G/DL (ref 11.7–15.7)
LDLC SERPL CALC-MCNC: ABNORMAL MG/DL
LDLC SERPL DIRECT ASSAY-MCNC: 177 MG/DL
LYMPHOCYTES # BLD AUTO: 2.1 10E9/L (ref 0.8–5.3)
LYMPHOCYTES NFR BLD AUTO: 24.5 %
MCH RBC QN AUTO: 30 PG (ref 26.5–33)
MCHC RBC AUTO-ENTMCNC: 33.6 G/DL (ref 31.5–36.5)
MCV RBC AUTO: 89 FL (ref 78–100)
MONOCYTES # BLD AUTO: 0.3 10E9/L (ref 0–1.3)
MONOCYTES NFR BLD AUTO: 3.8 %
NEUTROPHILS # BLD AUTO: 6 10E9/L (ref 1.6–8.3)
NEUTROPHILS NFR BLD AUTO: 70 %
NONHDLC SERPL-MCNC: 220 MG/DL
PLATELET # BLD AUTO: 235 10E9/L (ref 150–450)
RBC # BLD AUTO: 5.1 10E12/L (ref 3.8–5.2)
TRIGL SERPL-MCNC: 546 MG/DL
WBC # BLD AUTO: 8.6 10E9/L (ref 4–11)

## 2018-09-25 PROCEDURE — 83036 HEMOGLOBIN GLYCOSYLATED A1C: CPT | Performed by: NURSE PRACTITIONER

## 2018-09-25 PROCEDURE — 83721 ASSAY OF BLOOD LIPOPROTEIN: CPT | Performed by: NURSE PRACTITIONER

## 2018-09-25 PROCEDURE — 85025 COMPLETE CBC W/AUTO DIFF WBC: CPT | Performed by: NURSE PRACTITIONER

## 2018-09-25 PROCEDURE — 99214 OFFICE O/P EST MOD 30 MIN: CPT | Performed by: NURSE PRACTITIONER

## 2018-09-25 PROCEDURE — 84703 CHORIONIC GONADOTROPIN ASSAY: CPT | Performed by: NURSE PRACTITIONER

## 2018-09-25 PROCEDURE — 80061 LIPID PANEL: CPT | Performed by: NURSE PRACTITIONER

## 2018-09-25 PROCEDURE — 87389 HIV-1 AG W/HIV-1&-2 AB AG IA: CPT | Performed by: NURSE PRACTITIONER

## 2018-09-25 PROCEDURE — 36415 COLL VENOUS BLD VENIPUNCTURE: CPT | Performed by: NURSE PRACTITIONER

## 2018-09-25 NOTE — PATIENT INSTRUCTIONS
Please stop all blood thinners prior to surgery as discussed.  If pain is unmanageable, please let us know.  Follow up as needed for any health care questions or concerns.   Before Your Surgery      Call your surgeon if there is any change in your health. This includes signs of a cold or flu (such as a sore throat, runny nose, cough, rash or fever).    Do not smoke, drink alcohol or take over the counter medicine (unless your surgeon or primary care doctor tells you to) for the 24 hours before and after surgery.    If you take prescribed drugs: Follow your doctor s orders about which medicines to take and which to stop until after surgery.    Eating and drinking prior to surgery: follow the instructions from your surgeon    Take a shower or bath the night before surgery. Use the soap your surgeon gave you to gently clean your skin. If you do not have soap from your surgeon, use your regular soap. Do not shave or scrub the surgery site.  Wear clean pajamas and have clean sheets on your bed.

## 2018-09-25 NOTE — MR AVS SNAPSHOT
After Visit Summary   9/25/2018    Santos Bagley    MRN: [id number]           Patient Information     Date Of Birth          1985        Visit Information        Provider Department      9/25/2018 12:00 PM Diamond Keys NP [facility]        Today's Diagnoses     Preop general physical exam    -  1    Screening for human immunodeficiency virus        Lipid screening        Hyperlipidemia LDL goal <160        Class 2 obesity due to excess calories without serious comorbidity with body mass index (BMI) of 35.0 to 35.9 in adult        Impaired fasting glucose          Care Instructions    Please stop all blood thinners prior to surgery as discussed.  If pain is unmanageable, please let us know.  Follow up as needed for any health care questions or concerns.   Before Your Surgery      Call your surgeon if there is any change in your health. This includes signs of a cold or flu (such as a sore throat, runny nose, cough, rash or fever).    Do not smoke, drink alcohol or take over the counter medicine (unless your surgeon or primary care doctor tells you to) for the 24 hours before and after surgery.    If you take prescribed drugs: Follow your doctor s orders about which medicines to take and which to stop until after surgery.    Eating and drinking prior to surgery: follow the instructions from your surgeon    Take a shower or bath the night before surgery. Use the soap your surgeon gave you to gently clean your skin. If you do not have soap from your surgeon, use your regular soap. Do not shave or scrub the surgery site.  Wear clean pajamas and have clean sheets on your bed.           Follow-ups after your visit        Follow-up notes from your care team     Return if symptoms worsen or fail to improve.      Your next 10 appointments already scheduled     Oct 15, 2018   Procedure with Johnson Tomlin MD   [facility] (--)    [address]  [facility]  [address]   [phone]            Nov 27, 2018 10:00 AM CST   [facility] Visit with Sahra Coronado NP   [facility]    [address] [place], [place] [address] [address] [phone]              Who to contact     Normal or non-critical lab and imaging results will be communicated to you by [employer]ist[employer], letter or phone within 4 business days after the clinic has received the results. If you do not hear from us within 7 days, please contact the clinic through [employer]ourist[employer] or phone. If you have a critical or abnormal lab result, we will notify you by phone as soon as possible.  Submit refill requests through [employer] Direct or call your pharmacy and they will forward the refill request to us. Please allow 3 business days for your refill to be completed.          If you need to speak with a  for additional information , please call: [phone]             Additional Information About Your Visit        Leads Direct Information     [employer] Direct gives you secure access to your electronic health record. If you see a primary care provider, you can also send messages to your care team and make appointments. If you have questions, please call your primary care clinic.  If you do not have a primary care provider, please call [phone] and they will assist you.        Care EveryWhere ID     This is your Care EveryWhere ID. This could be used by other organizations to access your [facility] medical records  [id number]        Your Vitals Were     Pulse Temperature Respirations Pulse Oximetry BMI (Body Mass Index)       99 98.5  F (36.9  C) (Oral) 20 97% 42.66 kg/m2        Blood Pressure from Last 3 Encounters:   09/25/18 (!) 144/91   09/18/18 (!) 142/95   08/16/18 (!) 154/104    Weight from Last 3 Encounters:   09/25/18 225 lb 12.8 oz (102.4 kg)   09/18/18 226 lb 9.6 oz (102.8 kg)   06/26/18 225 lb 3.2 oz (102.2 kg)              We Performed the Following     Beta HCG qual IFA urine     CBC with  platelets and differential     Hemoglobin A1c     HIV Antigen Antibody Combo     Lipid panel reflex to direct LDL Non-fasting        Primary Care Provider Office Phone # Fax #    CIPRIANO Cuevas [facility] [phone] [address] [facility]  [place] [address]        Equal Access to Services     NI NICOLE : Hadii aad ku hadasho Soomaali, waaxda luqadaha, qaybta kaalmada adeegyada, waxay idiin hayaan adeeg kharash la'aan ah. So [facility] [phone].    ATENCIÓN: Si habla español, tiene a saxena disposición servicios gratuitos de asistencia lingüística. Rozina al [phone].    We comply with applicable federal civil rights laws and [place] laws. We do not discriminate on the basis of race, color, national origin, age, disability, sex, sexual orientation, or gender identity.            Thank you!     Thank you for choosing [facility]  for your care. Our goal is always to provide you with excellent care. Hearing back from our patients is one way we can continue to improve our services. Please take a few minutes to complete the written survey that you may receive in the mail after your visit with us. Thank you!             Your Updated Medication List - Protect others around you: Learn how to safely use, store and throw away your medicines at www.disposemymeds.org.          This list is accurate as of 9/25/18 12:05 PM.  Always use your most recent med list.                   Brand Name Dispense Instructions for use Diagnosis    IBUPROFEN PO      Take 800 mg by mouth every 6 hours        lidocaine 5 % Patch    LIDODERM    30 patch    Apply up to 3 patches to painful area at once for up to 12 h within a 24 h period.  Remove after 12 hours.    Lumbar pain with radiation down left leg       TYLENOL PO      Take by mouth 3 times daily

## 2018-09-25 NOTE — NURSING NOTE
"Chief Complaint   Patient presents with     Pre Op Exam       Initial BP (!) 144/91  Pulse 99  Temp 98.5  F (36.9  C) (Oral)  Resp 20  Wt 225 lb 12.8 oz (102.4 kg)  SpO2 97%  BMI 42.66 kg/m2 Estimated body mass index is 42.66 kg/(m^2) as calculated from the following:    Height as of 9/18/18: 5' 1\" (1.549 m).    Weight as of this encounter: 225 lb 12.8 oz (102.4 kg).  Medication Reconciliation: complete     Nany Reyes MA  "

## 2018-09-25 NOTE — PROGRESS NOTES
[facility]  [address]  [place] [address]  [phone]  Dept: [phone]    PRE-OP EVALUATION:  Today's date: 2018    Santos Bagley (: 1985) presents for pre-operative evaluation assessment as requested by Dr. Tomlin.  She requires evaluation and anesthesia risk assessment prior to undergoing surgery/procedure for treatment of pain .    Proposed Surgery/ Procedure: Left L5-s1 minimally invasive microdiscectomy  Date of Surgery/ Procedure: 10/15/18  Time of Surgery/ Procedure: 730am  Hospital/Surgical Facility: [facility]    Primary Physician: Gina Maldonado  Type of Anesthesia Anticipated: to be determined    Patient has a Health Care Directive or Living Will:  NO    1. NO - Do you have a history of heart attack, stroke, stent, bypass or surgery on an artery in the head, neck, heart or legs?  2. NO - Do you ever have any pain or discomfort in your chest?  3. NO - Do you have a history of  Heart Failure?  4. NO - Are you troubled by shortness of breath when: walking on the level, up a slight hill or at night?  5. NO - Do you currently have a cold, bronchitis or other respiratory infection?  6. NO - Do you have a cough, shortness of breath or wheezing?  7. YES - DO YOU SOMETIMES GET PAINS IN THE CALVES OF YOUR LEGS WHEN YOU WALK? Due to herniated disc- left leg  8. NO - Do you or anyone in your family have previous history of blood clots?  9. NO - Do you or does anyone in your family have a serious bleeding problem such as prolonged bleeding following surgeries or cuts?  10. NO - Have you ever had problems with anemia or been told to take iron pills?  11. NO - Have you had any abnormal blood loss such as black, tarry or bloody stools, or abnormal vaginal bleeding?  12. NO - Have you ever had a blood transfusion?  13. NO - Have you or any of your relatives ever had problems with anesthesia?  14. NO - Do you have sleep apnea, excessive snoring or daytime  drowsiness?  15. NO - Do you have any prosthetic heart valves?  16. NO - Do you have prosthetic joints?  17. NO - Is there any chance that you may be pregnant?      HPI:     HPI related to upcoming procedure: worsening back pain- severe, without medication cannot walk/ bend/ etc      HYPERLIPIDEMIA - Patient has a history of elevated Hyperlipidemia not requiring medication for treatment with recent fair control.                                                                                                                                                      MEDICAL HISTORY:     Patient Active Problem List    Diagnosis Date Noted     Morbid obesity (H) 09/18/2018     Priority: Medium     Impaired fasting glucose 03/23/2018     Priority: Medium     Class 2 obesity due to excess calories without serious comorbidity with body mass index (BMI) of 35.0 to 35.9 in adult 03/22/2018     Priority: Medium     Cervical high risk HPV (human papillomavirus) test positive 03/22/2018     Priority: Medium     3/22/18 NIL Pap, + HR HPV (Neg 16/18). Plan cotest in 1 year.        Hyperlipidemia LDL goal <160 12/06/2011     Priority: Medium     Chronic tonsillitis 10/04/2010     Priority: Medium      Past Medical History:   Diagnosis Date     Cervical high risk HPV (human papillomavirus) test positive 03/22/2018    See Problem List     Severe Cervical Dysplasia 7/02    LEEP, Needs annual pap smear screening     Past Surgical History:   Procedure Laterality Date     TUBAL LIGATION  2009     Current Outpatient Prescriptions   Medication Sig Dispense Refill     Acetaminophen (TYLENOL PO) Take by mouth 3 times daily       IBUPROFEN PO Take 800 mg by mouth every 6 hours       lidocaine (LIDODERM) 5 % Patch Apply up to 3 patches to painful area at once for up to 12 h within a 24 h period.  Remove after 12 hours. 30 patch 0     OTC products: None, except as noted above    Allergies   Allergen Reactions     Nkda [No Known Drug Allergies]        Latex Allergy: NO    Social History   Substance Use Topics     Smoking status: Former Smoker     Packs/day: 0.50     Types: Cigarettes     Smokeless tobacco: Never Used      Comment: former smoker     Alcohol use No     History   Drug Use No       REVIEW OF SYSTEMS:   CONSTITUTIONAL: NEGATIVE for fever, chills, change in weight  INTEGUMENTARY/SKIN: NEGATIVE for worrisome rashes, moles or lesions  EYES: NEGATIVE for vision changes or irritation  ENT/MOUTH: NEGATIVE for ear, mouth and throat problems  RESP: NEGATIVE for significant cough or SOB  BREAST: NEGATIVE for masses, tenderness or discharge  CV: NEGATIVE for chest pain, palpitations or peripheral edema  GI: NEGATIVE for nausea, abdominal pain, heartburn, or change in bowel habits  : NEGATIVE for frequency, dysuria, or hematuria  MUSCULOSKELETAL: NEGATIVE for significant arthralgias or myalgia  NEURO: NEGATIVE for weakness, dizziness or paresthesias  ENDOCRINE: NEGATIVE for temperature intolerance, skin/hair changes  HEME: NEGATIVE for bleeding problems  PSYCHIATRIC: NEGATIVE for changes in mood or affect    EXAM:   /86  Pulse 99  Temp 98.5  F (36.9  C) (Oral)  Resp 20  Wt 225 lb 12.8 oz (102.4 kg)  SpO2 97%  BMI 42.66 kg/m2    GENERAL APPEARANCE: healthy, alert and no distress     EYES: EOMI, PERRL     HENT: ear canals and TM's normal and nose and mouth without ulcers or lesions     NECK: no adenopathy, no asymmetry, masses, or scars and thyroid normal to palpation     RESP: lungs clear to auscultation - no rales, rhonchi or wheezes     CV: regular rates and rhythm, normal S1 S2, no S3 or S4 and no murmur, click or rub     ABDOMEN:  soft, nontender, no HSM or masses and bowel sounds normal     MS: extremities normal- no gross deformities noted, no evidence of inflammation in joints, FROM in all extremities. POSITIVE for low back pain with sitting/ movement.  Concerned that when discontinuing ibuprofen for surgery (which is the only things that  helps her pain) that she will not be able to control pain.      SKIN: no suspicious rashes     NEURO: Normal strength and tone, sensory exam grossly normal, mentation intact and speech normal     PSYCH: mentation appears normal. and affect normal/bright     LYMPHATICS: No cervical adenopathy    DIAGNOSTICS:     Labs Drawn and in Process:   Unresulted Labs Ordered in the Past 30 Days of this Admission     Date and Time Order Name Status Description    9/25/2018 1204 CBC WITH PLATELETS DIFFERENTIAL In process     9/25/2018 1204 LIPID REFLEX TO DIRECT LDL PANEL In process     9/25/2018 1204 HIV ANTIGEN ANTIBODY COMBO In process           Recent Labs   Lab Test  11/03/11   1605  10/26/10   1532   HGB   --   15.9*   NA  141   --    POTASSIUM  4.0   --    CR  0.64   --         IMPRESSION:   Reason for surgery/procedure: worsening low back pain with radiculopathy into left leg.   Diagnosis/reason for consult: pre-operative evaluation    The proposed surgical procedure is considered INTERMEDIATE risk.    REVISED CARDIAC RISK INDEX  The patient has the following serious cardiovascular risks for perioperative complications such as (MI, PE, VFib and 3  AV Block):  No serious cardiac risks  INTERPRETATION: 1 risks: Class II (low risk - 0.9% complication rate)    The patient has the following additional risks for perioperative complications:  No identified additional risks  Morbid obesity      ICD-10-CM    1. Preop general physical exam Z01.818 CBC with platelets and differential     Beta HCG qual IFA urine   2. Screening for human immunodeficiency virus Z11.4 HIV Antigen Antibody Combo    per protocol   3. Lipid screening Z13.220 Lipid panel reflex to direct LDL Non-fasting   4. Hyperlipidemia LDL goal <160 E78.5    5. Class 2 obesity due to excess calories without serious comorbidity with body mass index (BMI) of 35.0 to 35.9 in adult E66.09     Z68.35    6. Impaired fasting glucose R73.01 Hemoglobin A1c   7. Herniation of  intervertebral disc between L5 and S1 M51.27        RECOMMENDATIONS:       --Patient is to take all scheduled medications on the day of surgery EXCEPT for modifications listed below.  Discussed no blood thinners.     APPROVAL GIVEN to proceed with proposed procedure, without further diagnostic evaluation       Signed Electronically by: AMADOU Fregoso    Copy of this evaluation report is provided to requesting physician.    [facility] Preop Guidelines    Revised Cardiac Risk Index

## 2018-09-26 LAB — HIV 1+2 AB+HIV1 P24 AG SERPL QL IA: NONREACTIVE

## 2018-10-05 ENCOUNTER — TELEPHONE (OUTPATIENT)
Dept: NEUROSURGERY | Facility: CLINIC | Age: 33
End: 2018-10-05

## 2018-10-05 NOTE — TELEPHONE ENCOUNTER
Patient's surgery with Dr Tomlin has been canceled for 10/15/18 d/t Worker's comp denial. Per Dr Tomlin would like patient to obtain second opinion before being rescheduled. Called and left detailed message for patient. Advised to call back if any questions.

## 2018-10-30 ENCOUNTER — MYC MEDICAL ADVICE (OUTPATIENT)
Dept: FAMILY MEDICINE | Facility: CLINIC | Age: 33
End: 2018-10-30

## 2018-11-02 ENCOUNTER — TELEPHONE (OUTPATIENT)
Dept: NEUROSURGERY | Facility: CLINIC | Age: 33
End: 2018-11-02

## 2018-11-02 DIAGNOSIS — M54.16 LUMBAR RADICULOPATHY: Primary | ICD-10-CM

## 2018-11-02 NOTE — TELEPHONE ENCOUNTER
Per Dr Tomlin, Help patient get second opinion at [facility] or wherever she wants. BMI needs to be lower due to risk of infection for me.    Called and LVM. Awaiting call back from patient to discuss.

## 2018-11-02 NOTE — TELEPHONE ENCOUNTER
Per Dr Tomlin, Help patient get second opinion at [facility] or wherever she wants. BMI needs to be lower due to risk of infection for me.    Patient called back. Discussed above with her. Patient verbalized understanding and referral to [facility] for second opinion ordered in EPIC. Patient will contact us if any further questions or concerns.

## 2018-11-05 ENCOUNTER — OFFICE VISIT (OUTPATIENT)
Dept: FAMILY MEDICINE | Facility: CLINIC | Age: 33
End: 2018-11-05
Payer: OTHER MISCELLANEOUS

## 2018-11-05 VITALS
HEIGHT: 61 IN | RESPIRATION RATE: 20 BRPM | DIASTOLIC BLOOD PRESSURE: 76 MMHG | SYSTOLIC BLOOD PRESSURE: 120 MMHG | BODY MASS INDEX: 42.25 KG/M2 | HEART RATE: 122 BPM | TEMPERATURE: 98.2 F | WEIGHT: 223.8 LBS | OXYGEN SATURATION: 97 %

## 2018-11-05 DIAGNOSIS — M51.27 LUMBOSACRAL DISC HERNIATION: Primary | ICD-10-CM

## 2018-11-05 DIAGNOSIS — E11.9 TYPE 2 DIABETES MELLITUS WITHOUT COMPLICATION, WITHOUT LONG-TERM CURRENT USE OF INSULIN (H): ICD-10-CM

## 2018-11-05 PROCEDURE — 99213 OFFICE O/P EST LOW 20 MIN: CPT | Performed by: NURSE PRACTITIONER

## 2018-11-05 RX ORDER — GABAPENTIN 100 MG/1
CAPSULE ORAL
Qty: 90 CAPSULE | Refills: 0 | Status: SHIPPED | OUTPATIENT
Start: 2018-11-05 | End: 2018-12-03

## 2018-11-05 ASSESSMENT — PAIN SCALES - GENERAL: PAINLEVEL: SEVERE PAIN (7)

## 2018-11-05 NOTE — PROGRESS NOTES
SUBJECTIVE:   Santos Bagley is a 33 year old female who presents to clinic today for the following health issues:    Joint Pain    Onset: 12/20/17    Description:   Location: left buttock and leg  Character: Sharp, Stabbing and Cramping    Intensity: moderate    Progression of Symptoms: same    Accompanying Signs & Symptoms:  Other symptoms: numbness and weakness of left leg    History:   Previous similar pain: no       Precipitating factors:   Trauma or overuse: YES fell at work    Alleviating factors:  Improved by:  Ibuprofen    Therapies Tried and outcome:   Ibuprofen and helps    Patient notes constant pain since her injury on 12/2017.  She has seen multiple providers and has been treated with physical therapy, LESI x 2, medrol dose devin and several different anti-inflammatories.  She was scheduled for L5-S1 microdiscectomy, but it was cancelled due to denial by Workman's Comp.  She was requested to get a second opinion and plans to do so.  She notes continued pain and wonders what else she can do.  She is concerned about long term use of NSAIDS and affects on her kidneys.      Problem list and histories reviewed & adjusted, as indicated.  Additional history: as documented    Patient Active Problem List   Diagnosis     Chronic tonsillitis     Hyperlipidemia LDL goal <160     Class 2 obesity due to excess calories without serious comorbidity with body mass index (BMI) of 35.0 to 35.9 in adult     Impaired fasting glucose     Cervical high risk HPV (human papillomavirus) test positive     Morbid obesity (H)     Diabetes mellitus, type 2 (H)     Past Surgical History:   Procedure Laterality Date     TUBAL LIGATION  2009       Social History   Substance Use Topics     Smoking status: Former Smoker     Packs/day: 0.50     Types: Cigarettes     Smokeless tobacco: Never Used      Comment: former smoker     Alcohol use No     History reviewed. No pertinent family history.      Current Outpatient Prescriptions   Medication  "Sig Dispense Refill     Acetaminophen (TYLENOL PO) Take by mouth 3 times daily       gabapentin (NEURONTIN) 100 MG capsule Take 1 tablet (100 mg) every night for 1-3 days, then 1 tablet twice daily for 1-3 days, then 1 tablet three times daily 90 capsule 0     IBUPROFEN PO Take 800 mg by mouth every 6 hours       lidocaine (LIDODERM) 5 % Patch Apply up to 3 patches to painful area at once for up to 12 h within a 24 h period.  Remove after 12 hours. 30 patch 0     Allergies   Allergen Reactions     Nkda [No Known Drug Allergies]      BP Readings from Last 3 Encounters:   11/05/18 120/76   09/25/18 131/86   09/18/18 (!) 142/95    Wt Readings from Last 3 Encounters:   11/05/18 223 lb 12.8 oz (101.5 kg)   09/25/18 225 lb 12.8 oz (102.4 kg)   09/18/18 226 lb 9.6 oz (102.8 kg)                  Labs reviewed in EPIC    Reviewed and updated as needed this visit by clinical staff       Reviewed and updated as needed this visit by Provider           ROS:  Constitutional, HEENT, cardiovascular, pulmonary, gi and gu systems are negative, except as otherwise noted.    OBJECTIVE:     /76  Pulse 122  Temp 98.2  F (36.8  C) (Oral)  Resp 20  Ht 5' 1\" (1.549 m)  Wt 223 lb 12.8 oz (101.5 kg)  SpO2 97%  BMI 42.29 kg/m2  Body mass index is 42.29 kg/(m^2).  GENERAL: healthy, alert and no distress  RESP: lungs clear to auscultation - no rales, rhonchi or wheezes  CV: regular rate and rhythm, normal S1 S2, no S3 or S4, no murmur, click or rub, no peripheral edema and peripheral pulses strong  MS: no gross musculoskeletal defects noted, no edema    Diagnostic Test Results:  none     ASSESSMENT/PLAN:       1. Lumbosacral disc herniation  Patient to start low dose gabapentin and trial chiropractic care.  She has previously had some improvement in symptoms with chiropractic care.  - gabapentin (NEURONTIN) 100 MG capsule; Take 1 tablet (100 mg) every night for 1-3 days, then 1 tablet twice daily for 1-3 days, then 1 tablet three " times daily  Dispense: 90 capsule; Refill: 0  - [facility] CHIROPRACTIC REFERRAL; Future    2. Type 2 diabetes mellitus without complication, without long-term current use of insulin (H)  Noted on recent pre-op at new diagnosis.  Patient to schedule follow-up to discuss diagnosis and weight loss.      FUTURE APPOINTMENTS:       - Follow-up visit in 1-2 weeks.    CIPRIANO Perez [facility]

## 2018-11-05 NOTE — PATIENT INSTRUCTIONS
[facility]    If you have any questions regarding to your visit please contact your care team:     Team Pink:   Clinic Hours Telephone Number   Internal Medicine:  Dr. Tierra Maldonado NP 7am-7pm  Monday - Thursday   7am-5pm  Fridays  [phone]  (Appointment scheduling available 24/7)   Urgent Care - [place] [facility] - 11am-9pm Monday-Friday Saturday-Sunday- 9am-5pm   [facility] - 5pm-9pm Monday-Friday Saturday-Sunday- 9am-5pm  [phone] - [place]  [phone] - [place]       What options do I have for a visit other than an office visit? We offer electronic visits (e-visits) and telephone visits, when medically appropriate.  Please check with your medical insurance to see if these types of visits are covered, as you will be responsible for any charges that are not paid by your insurance.      You can use [employer] (secure electronic communication) to access to your chart, send your primary care provider a message, or make an appointment. Ask a team member how to get started.     For a price quote for your services, please call our Consumer Price Line at [phone] or our Imaging Cost estimation line at [phone] (for imaging tests).  Karen Gordillo MA

## 2018-11-05 NOTE — MR AVS SNAPSHOT
After Visit Summary   11/5/2018    Santos Bagley    MRN: [id number]           Patient Information     Date Of Birth          1985        Visit Information        Provider Department      11/5/2018 12:20 PM iGna Maldonado APRN [facility]        Today's Diagnoses     Lumbosacral disc herniation    -  1    Type 2 diabetes mellitus without complication, without long-term current use of insulin (H)          Care Instructions    [facility]    If you have any questions regarding to your visit please contact your care team:     Team Pink:   Clinic Hours Telephone Number   Internal Medicine:  Dr. Tierra Maldonado, NP 7am-7pm  Monday - Thursday   7am-5pm  Fridays  [phone]  (Appointment scheduling available 24/7)   Urgent Care - [place] - 11am-9pm Monday-Friday Saturday-Sunday- 9am-5pm   [place] - 5pm-9pm Monday-Friday Saturday-Sunday- 9am-5pm  [phone] - [place]  [phone] - [place]       What options do I have for a visit other than an office visit? We offer electronic visits (e-visits) and telephone visits, when medically appropriate.  Please check with your medical insurance to see if these types of visits are covered, as you will be responsible for any charges that are not paid by your insurance.      You can use [employer] (secure electronic communication) to access to your chart, send your primary care provider a message, or make an appointment. Ask a team member how to get started.     For a price quote for your services, please call our Consumer Price Line at [phone] or our Imaging Cost estimation line at [phone] (for imaging tests).  Karen Gordillo MA            Follow-ups after your visit        Additional Services     [facility] PT, HAND, AND CHIROPRACTIC REFERRAL       Dr. Sahni                  Follow-up notes from your care team     Return in about 2 weeks (around  "11/19/2018) for Diabetes, weight management.      Your next 10 appointments already scheduled     Nov 27, 2018 10:00 AM CST   [facility]    [address] [place] [address] [place] [address]   [phone]              Future tests that were ordered for you today     Open Future Orders        Priority Expected Expires Ordered    [facility] PT, HAND, AND CHIROPRACTIC REFERRAL Routine  11/5/2019 11/5/2018            Who to contact     If you have questions or need follow up information about today's clinic visit or your schedule please contact [facility] directly at [phone].  Normal or non-critical lab and imaging results will be communicated to you by [employer]hart, letter or phone within 4 business days after the clinic has received the results. If you do not hear from us within 7 days, please contact the clinic through [employer] or phone. If you have a critical or abnormal lab result, we will notify you by phone as soon as possible.  Submit refill requests through [employer] or call your pharmacy and they will forward the refill request to us. Please allow 3 business days for your refill to be completed.          Additional Information About Your Visit        OpenCounter Information     [employer] gives you secure access to your electronic health record. If you see a primary care provider, you can also send messages to your care team and make appointments. If you have questions, please call your primary care clinic.  If you do not have a primary care provider, please call [phone] and they will assist you.        Care EveryWhere ID     This is your Care EveryWhere ID. This could be used by other organizations to access your [place] medical records  [id number]        Your Vitals Were     Pulse Temperature Respirations Height Pulse Oximetry BMI (Body Mass Index)    122 98.2  F (36.8  C) (Oral) 20 5' 1\" (1.549 m) 97% 42.29 kg/m2       Blood " Pressure from Last 3 Encounters:   11/05/18 120/76   09/25/18 131/86   09/18/18 (!) 142/95    Weight from Last 3 Encounters:   11/05/18 223 lb 12.8 oz (101.5 kg)   09/25/18 225 lb 12.8 oz (102.4 kg)   09/18/18 226 lb 9.6 oz (102.8 kg)                 Today's Medication Changes          These changes are accurate as of 11/5/18  1:18 PM.  If you have any questions, ask your nurse or doctor.               Start taking these medicines.        Dose/Directions    gabapentin 100 MG capsule   Commonly known as:  NEURONTIN   Used for:  Lumbosacral disc herniation   Started by:  Gina Maldonado APRN CNP        Take 1 tablet (100 mg) every night for 1-3 days, then 1 tablet twice daily for 1-3 days, then 1 tablet three times daily   Quantity:  90 capsule   Refills:  0            Where to get your medicines      These medications were sent to [facility] - [address] [facility]  [address] [address] [facility] [address] [address]     Phone:  [phone]     gabapentin 100 MG capsule                Primary Care Provider Office Phone # Fax #    CIPRIANO Cuevas [phone] [address] [facility] [address]        Equal Access to Services     : Hadii aad ku hadasho Soomaali, waaxda luqadaha, qaybta kaalmada adeegyada, waxay idiin haylamont madera . So [facility] [phone].    ATENCIÓN: Si habla español, tiene a saxena disposición servicios gratuitos de asistencia lingüística. Llame al [phone].    We comply with applicable federal civil rights laws and [place] laws. We do not discriminate on the basis of race, color, national origin, age, disability, sex, sexual orientation, or gender identity.            Thank you!     Thank you for choosing [facility]  for your care. Our goal is always to provide you with excellent care. Hearing back from our patients is one way we can continue to improve our services. Please take a few  minutes to complete the written survey that you may receive in the mail after your visit with us. Thank you!             Your Updated Medication List - Protect others around you: Learn how to safely use, store and throw away your medicines at www.disposemymeds.org.          This list is accurate as of 11/5/18  1:18 PM.  Always use your most recent med list.                   Brand Name Dispense Instructions for use Diagnosis    gabapentin 100 MG capsule    NEURONTIN    90 capsule    Take 1 tablet (100 mg) every night for 1-3 days, then 1 tablet twice daily for 1-3 days, then 1 tablet three times daily    Lumbosacral disc herniation       IBUPROFEN PO      Take 800 mg by mouth every 6 hours        lidocaine 5 % Patch    LIDODERM    30 patch    Apply up to 3 patches to painful area at once for up to 12 h within a 24 h period.  Remove after 12 hours.    Lumbar pain with radiation down left leg       TYLENOL PO      Take by mouth 3 times daily

## 2018-11-05 NOTE — LETTER
November 5, 2018      Santos Bagley  [address] [place] [id number]        To Whom It May Concern:    Santos is under my care for a low back injury. She may return to work tomorrow 11/06/2018 with the following restrictions:  1) Bend: Occasionally (1-3 hours)  2) Squat: Occasionally (1-3 hours)  3) Walk/Stand: Frequently (4-8 hours)  4) Reach Above Shoulders: Frequently (4-8 hours)  5) Lift, carry, push, and pull no more than:  11-20 lbs.Light duty-unable to lift more than 20 pounds.      She will follow up in around 4-6 weeks.    Sincerely,        Gina Maldonado, CNP

## 2018-11-27 ENCOUNTER — TELEPHONE (OUTPATIENT)
Dept: NEUROSURGERY | Facility: CLINIC | Age: 33
End: 2018-11-27

## 2018-11-27 NOTE — TELEPHONE ENCOUNTER
Spoke with  Patient Santos, informed patient that we received an approval from insurance an would like to move further in scheduling surgery.   Patient states that Dr. Tomlin requested 2nd opinion due to BMI being high, also that she has lost weight and currently weighs 210, hoping that this would lower her BMI.   Patient understands that she will have [place] [facility] [place] before  [facility] can move forward in scheduling surgery.

## 2018-12-03 ENCOUNTER — OFFICE VISIT (OUTPATIENT)
Dept: FAMILY MEDICINE | Facility: CLINIC | Age: 33
End: 2018-12-03
Payer: COMMERCIAL

## 2018-12-03 VITALS
DIASTOLIC BLOOD PRESSURE: 70 MMHG | SYSTOLIC BLOOD PRESSURE: 126 MMHG | OXYGEN SATURATION: 99 % | RESPIRATION RATE: 20 BRPM | HEIGHT: 61 IN | TEMPERATURE: 99 F | BODY MASS INDEX: 41.04 KG/M2 | HEART RATE: 92 BPM | WEIGHT: 217.4 LBS

## 2018-12-03 DIAGNOSIS — E11.9 TYPE 2 DIABETES MELLITUS WITHOUT COMPLICATION, WITHOUT LONG-TERM CURRENT USE OF INSULIN (H): ICD-10-CM

## 2018-12-03 DIAGNOSIS — M51.27 LUMBOSACRAL DISC HERNIATION: Primary | ICD-10-CM

## 2018-12-03 PROBLEM — E66.812 CLASS 2 OBESITY DUE TO EXCESS CALORIES WITHOUT SERIOUS COMORBIDITY WITH BODY MASS INDEX (BMI) OF 35.0 TO 35.9 IN ADULT: Status: RESOLVED | Noted: 2018-03-22 | Resolved: 2018-12-03

## 2018-12-03 PROBLEM — E66.09 CLASS 2 OBESITY DUE TO EXCESS CALORIES WITHOUT SERIOUS COMORBIDITY WITH BODY MASS INDEX (BMI) OF 35.0 TO 35.9 IN ADULT: Status: RESOLVED | Noted: 2018-03-22 | Resolved: 2018-12-03

## 2018-12-03 PROCEDURE — 99214 OFFICE O/P EST MOD 30 MIN: CPT | Performed by: NURSE PRACTITIONER

## 2018-12-03 RX ORDER — LANCING DEVICE
EACH MISCELLANEOUS
Qty: 1 EACH | Refills: 0 | Status: SHIPPED | OUTPATIENT
Start: 2018-12-03 | End: 2019-08-06

## 2018-12-03 RX ORDER — GABAPENTIN 100 MG/1
100 CAPSULE ORAL 2 TIMES DAILY
Qty: 180 CAPSULE | Refills: 1 | Status: SHIPPED | OUTPATIENT
Start: 2018-12-03 | End: 2018-12-11

## 2018-12-03 ASSESSMENT — PAIN SCALES - GENERAL: PAINLEVEL: NO PAIN (0)

## 2018-12-03 NOTE — PROGRESS NOTES
SUBJECTIVE:   Santos Bagley is a 33 year old female who presents to clinic today for the following health issues:      Diabetes Follow-up      Patient is checking blood sugars: not at all    Diabetic concerns: None     Symptoms of hypoglycemia (low blood sugar): none     Paresthesias (numbness or burning in feet) or sores: No     Date of last diabetic eye exam: long time     BP Readings from Last 2 Encounters:   12/03/18 126/70   11/05/18 120/76     Hemoglobin A1C (%)   Date Value   09/25/2018 6.5 (H)     LDL Cholesterol Calculated (mg/dL)   Date Value   09/25/2018     Cannot estimate LDL when triglyceride exceeds 400 mg/dL   09/17/2013 174 (H)     LDL Cholesterol Direct (mg/dL)   Date Value   09/25/2018 177 (H)       Diabetes Management Resources    Amount of exercise or physical activity: 6-7 days/week for an average of 2 hours     Problems taking medications regularly: No    Medication side effects: none    Diet: low salt    Patient notes pain is improved on gabapentin.  She has been able to exercise and do more activities.  She has been exercising 2-3 hours per day and has limited herself to 1300 calories per day.  She is trying to eat less than 100 carbs per day per her chiropractors recommendation.  She is interested in losing weight to have back surgery and improve blood sugars.  Patient is newly diagnosed with diabetes and was diagnosed at her pre-op appointment.      Problem list and histories reviewed & adjusted, as indicated.  Additional history: as documented    Patient Active Problem List   Diagnosis     Chronic tonsillitis     Hyperlipidemia LDL goal <160     Impaired fasting glucose     Cervical high risk HPV (human papillomavirus) test positive     Morbid obesity (H)     Type 2 diabetes mellitus without complication, without long-term current use of insulin (H)     Past Surgical History:   Procedure Laterality Date     TUBAL LIGATION  2009       Social History   Substance Use Topics     Smoking  status: Former Smoker     Packs/day: 0.50     Types: Cigarettes     Smokeless tobacco: Never Used      Comment: former smoker     Alcohol use No     Family History   Problem Relation Age of Onset     Family history unknown: Yes         Current Outpatient Prescriptions   Medication Sig Dispense Refill     Acetaminophen (TYLENOL PO) Take by mouth 3 times daily       blood glucose (NO BRAND SPECIFIED) lancets standard Use to test blood sugar one times daily or as directed. 100 each 11     blood glucose (NO BRAND SPECIFIED) lancing device Use to test blood sugars one times daily or as directed. 1 each 0     blood glucose monitoring (NO BRAND SPECIFIED) meter device kit Use to test blood sugar one times daily or as directed. 1 kit 0     blood glucose monitoring (NO BRAND SPECIFIED) test strip Use to test blood sugars one times daily or as directed 100 strip 3     gabapentin (NEURONTIN) 100 MG capsule Take 1 capsule (100 mg) by mouth 2 times daily 180 capsule 1     IBUPROFEN PO Take 800 mg by mouth every 6 hours       insulin pen needle (31G X 6 MM) 31G X 6 MM miscellaneous Use once weekly or as directed. 30 each 1     lidocaine (LIDODERM) 5 % Patch Apply up to 3 patches to painful area at once for up to 12 h within a 24 h period.  Remove after 12 hours. 30 patch 0     Semaglutide 0.25 or 0.5 MG/DOSE SOPN Inject 0.25 mg Subcutaneous every 7 days 1 pen 0     Allergies   Allergen Reactions     Nkda [No Known Drug Allergies]      BP Readings from Last 3 Encounters:   12/03/18 126/70   11/05/18 120/76   09/25/18 131/86    Wt Readings from Last 3 Encounters:   12/03/18 217 lb 6.4 oz (98.6 kg)   11/05/18 223 lb 12.8 oz (101.5 kg)   09/25/18 225 lb 12.8 oz (102.4 kg)                  Labs reviewed in EPIC    Reviewed and updated as needed this visit by clinical staff       Reviewed and updated as needed this visit by Provider         ROS:  Constitutional, HEENT, cardiovascular, pulmonary, gi and gu systems are negative, except  "as otherwise noted.    OBJECTIVE:     /70  Pulse 92  Temp 99  F (37.2  C) (Oral)  Resp 20  Ht 5' 1\" (1.549 m)  Wt 217 lb 6.4 oz (98.6 kg)  SpO2 99%  BMI 41.08 kg/m2  Body mass index is 41.08 kg/(m^2).  GENERAL: alert, no distress and obese  RESP: lungs clear to auscultation - no rales, rhonchi or wheezes  CV: regular rate and rhythm, normal S1 S2, no S3 or S4, no murmur, click or rub, no peripheral edema and peripheral pulses strong  MS: no gross musculoskeletal defects noted, no edema    Diagnostic Test Results:  none     ASSESSMENT/PLAN:     1. Lumbosacral disc herniation  Patient to continue low dose gabapentin for pain control.  - gabapentin (NEURONTIN) 100 MG capsule; Take 1 capsule (100 mg) by mouth 2 times daily  Dispense: 180 capsule; Refill: 1    2. Type 2 diabetes mellitus without complication, without long-term current use of insulin (H)  Patient to aim for 0851-2008 calories per day and 150-165 grams of carbs per day.  I advised her to only exercise at most 1 hour per day and if she is doing that to aim for 1700 calories.  Our goal is for patient to have a weight loss that she is able to maintain.  Patient to start semaglutide to improve blood glucose control and hopefully help with weight loss.  I did discuss the pathophysiology of diabetes with patient, along with potential complications if untreated.  We discussed recommendations for carbohydrate intake and eating a well balanced diet.  We discussed expectations regarding monitoring of disease and potential future blood work and urine testing.  Patient encouraged to schedule an eye exam.  Will do additional blood work at follow-up appointment.  - DIABETES EDUCATOR REFERRAL  - Semaglutide 0.25 or 0.5 MG/DOSE SOPN; Inject 0.25 mg Subcutaneous every 7 days  Dispense: 1 pen; Refill: 0  - insulin pen needle (31G X 6 MM) 31G X 6 MM miscellaneous; Use once weekly or as directed.  Dispense: 30 each; Refill: 1  - blood glucose monitoring (NO BRAND " SPECIFIED) test strip; Use to test blood sugars one times daily or as directed  Dispense: 100 strip; Refill: 3  - blood glucose monitoring (NO BRAND SPECIFIED) meter device kit; Use to test blood sugar one times daily or as directed.  Dispense: 1 kit; Refill: 0  - blood glucose (NO BRAND SPECIFIED) lancing device; Use to test blood sugars one times daily or as directed.  Dispense: 1 each; Refill: 0  - blood glucose (NO BRAND SPECIFIED) lancets standard; Use to test blood sugar one times daily or as directed.  Dispense: 100 each; Refill: 11    3. BMI 40.0-44.9, adult (H)  As above.   - Semaglutide 0.25 or 0.5 MG/DOSE SOPN; Inject 0.25 mg Subcutaneous every 7 days  Dispense: 1 pen; Refill: 0  - insulin pen needle (31G X 6 MM) 31G X 6 MM miscellaneous; Use once weekly or as directed.  Dispense: 30 each; Refill: 1    FUTURE APPOINTMENTS:       - Follow-up visit in 1 month.    CIPRIANO Perez [facility]

## 2018-12-03 NOTE — PATIENT INSTRUCTIONS
Aim for 1700 calories per day  Aim for 150-165 grams grams of carbs per day.      [facility]    If you have any questions regarding to your visit please contact your care team:     Team Pink:   Clinic Hours Telephone Number   Internal Medicine:  Dr. Tierra Maldonado, NP 7am-7pm  Monday - Thursday   7am-5pm  Fridays  [phone]  (Appointment scheduling available 24/7)   Urgent Care - [place] [facility] - 11am-9pm Monday-Friday Saturday-Sunday- 9am-5pm   [place] - 5pm-9pm Monday-Friday Saturday-Sunday- 9am-5pm  [phone] - [place]  [phone] - [place]       What options do I have for a visit other than an office visit? We offer electronic visits (e-visits) and telephone visits, when medically appropriate.  Please check with your medical insurance to see if these types of visits are covered, as you will be responsible for any charges that are not paid by your insurance.      You can use [employer] (secure electronic communication) to access to your chart, send your primary care provider a message, or make an appointment. Ask a team member how to get started.     For a price quote for your services, please call our Consumer Price Line at [phone] or our Imaging Cost estimation line at [phone] (for imaging tests).  Josefa THOMAS CMA ([facility])

## 2018-12-14 ENCOUNTER — HOSPITAL ENCOUNTER (OUTPATIENT)
Facility: CLINIC | Age: 33
End: 2018-12-14
Attending: NEUROLOGICAL SURGERY | Admitting: NEUROLOGICAL SURGERY
Payer: OTHER MISCELLANEOUS

## 2018-12-14 ENCOUNTER — TELEPHONE (OUTPATIENT)
Facility: CLINIC | Age: 33
End: 2018-12-14

## 2018-12-14 NOTE — TELEPHONE ENCOUNTER
Left Voice message, informing patient that surgery will be canceled, Dr. Tomlin feels patient should keep with continued care at the [facility] with Dr. Ferguson.

## 2018-12-14 NOTE — TELEPHONE ENCOUNTER
Spoke with Santos this morning  In regards to getting her scheduled for surgery.   Patient currently has appointments with the [facility] pertaining to BMI and other health reasons all recommended by Dr. Tomlin.     I explained that  We have already received approval from [employer] claim and would need to submit and surgery date before end of year.     Reschedule patients surgery for 01/18/2018 at [facility]

## 2018-12-20 ENCOUNTER — PATIENT OUTREACH (OUTPATIENT)
Dept: CARE COORDINATION | Facility: CLINIC | Age: 33
End: 2018-12-20

## 2018-12-27 ENCOUNTER — OFFICE VISIT (OUTPATIENT)
Dept: NEUROSURGERY | Facility: CLINIC | Age: 33
End: 2018-12-27
Payer: COMMERCIAL

## 2018-12-27 ENCOUNTER — PATIENT OUTREACH (OUTPATIENT)
Dept: NEUROSURGERY | Facility: CLINIC | Age: 33
End: 2018-12-27

## 2018-12-27 VITALS
BODY MASS INDEX: 39.16 KG/M2 | WEIGHT: 207.4 LBS | HEIGHT: 61 IN | SYSTOLIC BLOOD PRESSURE: 129 MMHG | DIASTOLIC BLOOD PRESSURE: 81 MMHG | OXYGEN SATURATION: 97 % | HEART RATE: 90 BPM

## 2018-12-27 DIAGNOSIS — M54.16 LUMBAR RADICULOPATHY: Primary | ICD-10-CM

## 2018-12-27 DIAGNOSIS — M54.42 CHRONIC LEFT-SIDED LOW BACK PAIN WITH LEFT-SIDED SCIATICA: ICD-10-CM

## 2018-12-27 DIAGNOSIS — G89.29 CHRONIC LEFT-SIDED LOW BACK PAIN WITH LEFT-SIDED SCIATICA: ICD-10-CM

## 2018-12-27 ASSESSMENT — PAIN SCALES - GENERAL: PAINLEVEL: NO PAIN (0)

## 2018-12-27 ASSESSMENT — MIFFLIN-ST. JEOR: SCORE: 1570.2

## 2018-12-27 NOTE — PROGRESS NOTES
Pre-op packet sent via US Mail with instructions to call upon receipt.    PAC:  1/22/19  DOS: 2/5/19

## 2018-12-27 NOTE — PROGRESS NOTES
Service Date: 12/27/2018      HISTORY OF PRESENT ILLNESS:  Ms. Bagley was seen in Neurosurgery Clinic today, 12/27/2018.  She was previously seen by this process [facility] at [facility].  The notes are in the system.      In summary, this is a pleasant 33-year-old woman with a history of left lower extremity pain resulting after a work incident on 12/20/2017.  The pain did not resolve over time, and therefore, she was seen at [facility] in June and was offered the option of surgical intervention.  At that time, a large left L5-S1 disk was seen on MRI imaging and therefore, a microdiskectomy was offered.  At that time, she opted to repeat a lumbar ENIO.  This was the second of two ESIs, and she stated the second one worked for one day only.  She then was interested in surgery; however, she was found to be prediabetic and therefore that the surgery was delayed.  She also mentioned something about Workers' Compensation requiring a second opinion, but apparently that has been approved as well.      Of note, she has never had a back surgery previously.  She does have history a of tonsillectomy and tubal ligation.  She has undergone conservative therapy including 2 ESIs.  She was on large doses of daily ibuprofen; however, recently she has started gabapentin and she states that this has helped her tremendously.  However, when she starts to cut back on the gabapentin, she starts to feel the pain in her leg again.  The pain she describes as into her left buttock and going down her leg to the ankle.  She has also lost 30 pounds over the past year in the hopes that this would help; however, the pain persists.        PHYSICAL EXAMINATION:  She is in no obvious distress.  She does not have any weakness detectable in either her upper or lower extremities.  She does not have any numbness or tingling at this period of time or sensory loss.  She does not have any other abnormalities detected on  neurological exam.      She does have a more recent MRI performed in 2018.  This again demonstrates the disk herniation at the left L5-S1 disk space.  Her description is most consistent with an S1 radiculopathy on the left.  Therefore, I have offered her a L5-S1 minimally invasive microdiskectomy and we will schedule that as soon as possible.        It was my pleasure to see her in Neurosurgery Clinic today.      cc:      Gina Maldonado NP   [address] [address]         SALLIE REYNOLDS MD             D: 2018   T: 2018   MT: MEGAN      Name:     MINISTERIO BURRELL   MRN:      [id number]        Account:      [id number]   :      1985           Service Date: 2018      Document: [id number]

## 2018-12-27 NOTE — NURSING NOTE
Chief Complaint   Patient presents with     Consult     [facility] NEW - 2ND OPINION, LUMBAR RADICULOPAHTY       Dev Ellis, EMT

## 2018-12-27 NOTE — LETTER
12/27/2018       RE: Santos Bagley  [address] [place] [phone]     Dear Colleague,    Thank you for referring your patient, Santos Bagley, to the [facility] NEUROSURGERY at [facility]. Please see a copy of my visit note below.    Service Date: 12/27/2018      HISTORY OF PRESENT ILLNESS:  Ms. Bagley was seen in Neurosurgery Clinic today, 12/27/2018.  She was previously seen by this process [facility] Clinic at [facility].  The notes are in the system.      In summary, this is a pleasant 33-year-old woman with a history of left lower extremity pain resulting after a work incident on 12/20/2017.  The pain did not resolve over time, and therefore, she was seen at [facility] in June and was offered the option of surgical intervention.  At that time, a large left L5-S1 disk was seen on MRI imaging and therefore, a microdiskectomy was offered.  At that time, she opted to repeat a lumbar ENIO.  This was the second of two ESIs, and she stated the second one worked for one day only.  She then was interested in surgery; however, she was found to be prediabetic and therefore that the surgery was delayed.  She also mentioned something about Workers' Compensation requiring a second opinion, but apparently that has been approved as well.      Of note, she has never had a back surgery previously.  She does have history a of tonsillectomy and tubal ligation.  She has undergone conservative therapy including 2 ESIs.  She was on large doses of daily ibuprofen; however, recently she has started gabapentin and she states that this has helped her tremendously.  However, when she starts to cut back on the gabapentin, she starts to feel the pain in her leg again.  The pain she describes as into her left buttock and going down her leg to the ankle.  She has also lost 30 pounds over the past year in the hopes that this would help; however, the pain persists.        PHYSICAL  EXAMINATION:  She is in no obvious distress.  She does not have any weakness detectable in either her upper or lower extremities.  She does not have any numbness or tingling at this period of time or sensory loss.  She does not have any other abnormalities detected on neurological exam.      She does have a more recent MRI performed in 2018.  This again demonstrates the disk herniation at the left L5-S1 disk space.  Her description is most consistent with an S1 radiculopathy on the left.  Therefore, I have offered her a L5-S1 minimally invasive microdiskectomy and we will schedule that as soon as possible.        It was my pleasure to see her in Neurosurgery Clinic today.      cc:      Gina Maldonado NP   [address] [address]         SALLIE REYNOLDS MD       D: 2018   T: 2018   MT: MEGAN      Name:     MINISTERIO BURRELL   MRN:      [id number]        Account:      [id number]   :      1985           Service Date: 2018      Document: [id number]

## 2019-01-04 ENCOUNTER — TELEPHONE (OUTPATIENT)
Dept: FAMILY MEDICINE | Facility: CLINIC | Age: 34
End: 2019-01-04

## 2019-01-04 ENCOUNTER — OFFICE VISIT (OUTPATIENT)
Dept: FAMILY MEDICINE | Facility: CLINIC | Age: 34
End: 2019-01-04
Payer: COMMERCIAL

## 2019-01-04 VITALS
DIASTOLIC BLOOD PRESSURE: 60 MMHG | BODY MASS INDEX: 38.23 KG/M2 | RESPIRATION RATE: 20 BRPM | SYSTOLIC BLOOD PRESSURE: 120 MMHG | WEIGHT: 202.5 LBS | HEIGHT: 61 IN | OXYGEN SATURATION: 100 % | HEART RATE: 86 BPM | TEMPERATURE: 98 F

## 2019-01-04 DIAGNOSIS — E66.01 CLASS 2 SEVERE OBESITY DUE TO EXCESS CALORIES WITH SERIOUS COMORBIDITY AND BODY MASS INDEX (BMI) OF 38.0 TO 38.9 IN ADULT (H): ICD-10-CM

## 2019-01-04 DIAGNOSIS — Z13.89 SCREENING FOR DIABETIC PERIPHERAL NEUROPATHY: ICD-10-CM

## 2019-01-04 DIAGNOSIS — E11.9 TYPE 2 DIABETES MELLITUS WITHOUT COMPLICATION, WITHOUT LONG-TERM CURRENT USE OF INSULIN (H): Primary | ICD-10-CM

## 2019-01-04 DIAGNOSIS — E66.812 CLASS 2 SEVERE OBESITY DUE TO EXCESS CALORIES WITH SERIOUS COMORBIDITY AND BODY MASS INDEX (BMI) OF 38.0 TO 38.9 IN ADULT (H): ICD-10-CM

## 2019-01-04 DIAGNOSIS — E11.9 TYPE 2 DIABETES MELLITUS WITHOUT COMPLICATION, WITHOUT LONG-TERM CURRENT USE OF INSULIN (H): ICD-10-CM

## 2019-01-04 LAB
ANION GAP SERPL CALCULATED.3IONS-SCNC: 8 MMOL/L (ref 3–14)
BUN SERPL-MCNC: 19 MG/DL (ref 7–30)
CALCIUM SERPL-MCNC: 9.3 MG/DL (ref 8.5–10.1)
CHLORIDE SERPL-SCNC: 107 MMOL/L (ref 94–109)
CO2 SERPL-SCNC: 25 MMOL/L (ref 20–32)
CREAT SERPL-MCNC: 0.62 MG/DL (ref 0.52–1.04)
CREAT UR-MCNC: 150 MG/DL
GFR SERPL CREATININE-BSD FRML MDRD: >90 ML/MIN/{1.73_M2}
GLUCOSE SERPL-MCNC: 78 MG/DL (ref 70–99)
HBA1C MFR BLD: 5.5 % (ref 0–5.6)
MICROALBUMIN UR-MCNC: 11 MG/L
MICROALBUMIN/CREAT UR: 7.47 MG/G CR (ref 0–25)
POTASSIUM SERPL-SCNC: 4.2 MMOL/L (ref 3.4–5.3)
SODIUM SERPL-SCNC: 140 MMOL/L (ref 133–144)

## 2019-01-04 PROCEDURE — 82043 UR ALBUMIN QUANTITATIVE: CPT | Performed by: NURSE PRACTITIONER

## 2019-01-04 PROCEDURE — 99207 C FOOT EXAM  NO CHARGE: CPT | Performed by: NURSE PRACTITIONER

## 2019-01-04 PROCEDURE — 83036 HEMOGLOBIN GLYCOSYLATED A1C: CPT | Performed by: NURSE PRACTITIONER

## 2019-01-04 PROCEDURE — 36415 COLL VENOUS BLD VENIPUNCTURE: CPT | Performed by: NURSE PRACTITIONER

## 2019-01-04 PROCEDURE — 99214 OFFICE O/P EST MOD 30 MIN: CPT | Performed by: NURSE PRACTITIONER

## 2019-01-04 PROCEDURE — 80048 BASIC METABOLIC PNL TOTAL CA: CPT | Performed by: NURSE PRACTITIONER

## 2019-01-04 ASSESSMENT — PAIN SCALES - GENERAL: PAINLEVEL: NO PAIN (0)

## 2019-01-04 ASSESSMENT — MIFFLIN-ST. JEOR: SCORE: 1547.97

## 2019-01-04 NOTE — TELEPHONE ENCOUNTER
Prior authorization required for : Ozempic 0.25 or 0.5mg/dose   Insurance plan: [employer]  Patient Id:[id number]  Bin Number: [id number]  Pcn: N/A  Help Desk Number: [phone]    Please fax over an alternative medication to the pharmacy or if you are going to pursue a prior authorization please contact the pharmacy and patient when approved. Thanks!    Valeria Black CPhT  [facility]

## 2019-01-04 NOTE — PATIENT INSTRUCTIONS
Eat more fruits and vegetables.  Aim for 0818-2752 calories per day.  Okay to have 135-150 g of carbs per day.  Aim for  g of protein per day.    [facility]    If you have any questions regarding to your visit please contact your care team:     Team Pink:   Clinic Hours Telephone Number   Internal Medicine:  Dr. Tierra Maldonado, NP 7am-7pm  Monday - Thursday   7am-5pm  Fridays  [phone]  (Appointment scheduling available 24/7)   Urgent Care - [place] and [facility] - 11am-9pm Monday-Friday Saturday-Sunday- 9am-5pm   [place] - 5pm-9pm Monday-Friday Saturday-Sunday- 9am-5pm  [phone] - [place]  [phone] - [place]       What options do I have for a visit other than an office visit? We offer electronic visits (e-visits) and telephone visits, when medically appropriate.  Please check with your medical insurance to see if these types of visits are covered, as you will be responsible for any charges that are not paid by your insurance.      You can use App[employer]Log (secure electronic communication) to access to your chart, send your primary care provider a message, or make an appointment. Ask a team member how to get started.     For a price quote for your services, please call our Consumer Price Line at [phone] or our Imaging Cost estimation line at [phone] (for imaging tests).  Delilah STRICKLAND CMA

## 2019-01-04 NOTE — PROGRESS NOTES
SUBJECTIVE:   Santos Bagley is a 33 year old female who presents to clinic today for the following health issues:      Diabetes Follow-up      Patient is checking blood sugars: 1-2 times a week morning 85 after eating 119    Diabetic concerns: None     Symptoms of hypoglycemia (low blood sugar): none     Paresthesias (numbness or burning in feet) or sores: Yes burning     Date of last diabetic eye exam: due    BP Readings from Last 2 Encounters:   01/04/19 120/60   12/27/18 129/81     Hemoglobin A1C (%)   Date Value   01/04/2019 5.5   09/25/2018 6.5 (H)     LDL Cholesterol Calculated (mg/dL)   Date Value   09/25/2018     Cannot estimate LDL when triglyceride exceeds 400 mg/dL   09/17/2013 174 (H)     LDL Cholesterol Direct (mg/dL)   Date Value   09/25/2018 177 (H)       Diabetes Management Resources    Amount of exercise or physical activity: 6-7 days/week for an average of greater than 60 minutes    Problems taking medications regularly: No    Medication side effects: none    Diet: regular (no restrictions)    Patient has lost 15 lbs since starting semaglutide.  She continues exercise with 30 minutes of cardio with strength training.  She denies any side effects from semaglutide.  She feels it has helped decrease her appetite.  Patient is eating about 1300 calories per day.  She is only eating about 100 carbs per day.      Problem list and histories reviewed & adjusted, as indicated.  Additional history: as documented    Patient Active Problem List   Diagnosis     Chronic tonsillitis     Hyperlipidemia LDL goal <160     Impaired fasting glucose     Cervical high risk HPV (human papillomavirus) test positive     Morbid obesity (H)     Type 2 diabetes mellitus without complication, without long-term current use of insulin (H)     Past Surgical History:   Procedure Laterality Date     TUBAL LIGATION  2009       Social History     Tobacco Use     Smoking status: Former Smoker     Packs/day: 0.50     Types: Cigarettes      Smokeless tobacco: Never Used     Tobacco comment: former smoker   Substance Use Topics     Alcohol use: No     Family History   Family history unknown: Yes         Current Outpatient Medications   Medication Sig Dispense Refill     Acetaminophen (TYLENOL PO) Take by mouth 3 times daily       blood glucose (NO BRAND SPECIFIED) lancets standard Use to test blood sugar one times daily or as directed. 100 each 11     blood glucose (NO BRAND SPECIFIED) lancing device Use to test blood sugars one times daily or as directed. 1 each 0     blood glucose monitoring (NO BRAND SPECIFIED) meter device kit Use to test blood sugar one times daily or as directed. 1 kit 0     blood glucose monitoring (NO BRAND SPECIFIED) test strip Use to test blood sugars one times daily or as directed 100 strip 3     gabapentin (NEURONTIN) 100 MG capsule Take 1 capsule (100 mg) by mouth 3 times daily 270 capsule 1     IBUPROFEN PO Take 800 mg by mouth every 6 hours       lidocaine (LIDODERM) 5 % Patch Apply up to 3 patches to painful area at once for up to 12 h within a 24 h period.  Remove after 12 hours. 30 patch 0     Semaglutide 0.25 or 0.5 MG/DOSE SOPN Inject 0.25 mg Subcutaneous every 7 days 1 pen 1     insulin pen needle (31G X 6 MM) 31G X 6 MM miscellaneous Use once weekly or as directed. (Patient not taking: Reported on 1/4/2019) 30 each 1     Allergies   Allergen Reactions     Nkda [No Known Drug Allergies]      BP Readings from Last 3 Encounters:   01/04/19 120/60   12/27/18 129/81   12/03/18 126/70    Wt Readings from Last 3 Encounters:   01/04/19 91.9 kg (202 lb 8 oz)   12/27/18 94.1 kg (207 lb 6.4 oz)   12/03/18 98.6 kg (217 lb 6.4 oz)                  Labs reviewed in EPIC    Reviewed and updated as needed this visit by clinical staff  Tobacco  Allergies  Meds  Med Hx  Surg Hx  Fam Hx  Soc Hx      Reviewed and updated as needed this visit by Provider  Allergies  Meds         ROS:  Constitutional, HEENT, cardiovascular,  "pulmonary, gi and gu systems are negative, except as otherwise noted.    OBJECTIVE:     /60   Pulse 86   Temp 98  F (36.7  C) (Oral)   Resp 20   Ht 1.537 m (5' 0.5\")   Wt 91.9 kg (202 lb 8 oz)   SpO2 100%   BMI 38.90 kg/m    Body mass index is 38.9 kg/m .  GENERAL: alert, no distress and obese  RESP: lungs clear to auscultation - no rales, rhonchi or wheezes  CV: regular rate and rhythm, normal S1 S2, no S3 or S4, no murmur, click or rub, no peripheral edema and peripheral pulses strong  MS: no gross musculoskeletal defects noted, no edema  Diabetic foot exam: normal DP and PT pulses, no trophic changes or ulcerative lesions and normal sensory exam    Diagnostic Test Results:  Results for orders placed or performed in visit on 01/04/19 (from the past 24 hour(s))   Hemoglobin A1c   Result Value Ref Range    Hemoglobin A1C 5.5 0 - 5.6 %       ASSESSMENT/PLAN:     1. Type 2 diabetes mellitus without complication, without long-term current use of insulin (H)  Stable.  Continue current treatment plan and medications.    - Albumin Random Urine Quantitative with Creat Ratio  - Hemoglobin A1c  - Basic metabolic panel  - Semaglutide 0.25 or 0.5 MG/DOSE SOPN; Inject 0.25 mg Subcutaneous every 7 days  Dispense: 1 pen; Refill: 1    2. Class 2 severe obesity due to excess calories with serious comorbidity and body mass index (BMI) of 38.0 to 38.9 in adult (H)  Patient congratulated on weight loss.  I asked that she add more carbs to her diet to ensure she is eating 8986-9114 calories per day.  Continue semaglutide at current dose.  - Semaglutide 0.25 or 0.5 MG/DOSE SOPN; Inject 0.25 mg Subcutaneous every 7 days  Dispense: 1 pen; Refill: 1    3. Screening for diabetic peripheral neuropathy    - FOOT EXAM  NO CHARGE [95935.114]    FUTURE APPOINTMENTS:       - Follow-up visit in 8 weeks.    CIPRIANO Perez [facility]    "

## 2019-01-07 NOTE — RESULT ENCOUNTER NOTE
Dear Santos,    Your recent test results are attached.       No excess protein in the urine.  Normal kidney function and electrolytes.      If you have any questions please feel free to contact [phone] or myself via [employer].    Sincerely,  Gina Maldonado, CNP

## 2019-01-07 NOTE — TELEPHONE ENCOUNTER
Patient notified and agreed to try Trulicity  She has 1 more dose left of the Semaglutide, then will switch over to the Trulicity    Prescription sent as pended by provider  Semaglutide removed from epic med list    Catia Hu RN

## 2019-01-08 ENCOUNTER — TELEPHONE (OUTPATIENT)
Dept: FAMILY MEDICINE | Facility: CLINIC | Age: 34
End: 2019-01-08

## 2019-01-08 DIAGNOSIS — E11.9 TYPE 2 DIABETES MELLITUS WITHOUT COMPLICATION, WITHOUT LONG-TERM CURRENT USE OF INSULIN (H): Primary | ICD-10-CM

## 2019-01-08 NOTE — TELEPHONE ENCOUNTER
Prior authorization required for : Trulicity 0.75mg/0.5ml soln  Insurance plan: [employer]  Patient Id:[id number]  Bin Number: [id number]  Pcn: N/A  Help Desk Number: [phone]    Please fax over an alternative medication to the pharmacy or if you are going to pursue a prior authorization please contact the pharmacy and patient when approved. Thanks!    Valeria Black [facility] [place]

## 2019-01-09 NOTE — TELEPHONE ENCOUNTER
Please have PA done on semaglutide.  Patient has been able to lost 15 lbs on medication and has reduced her A1C to 5.5 from 6.5.    Gina Maldonado, CNP

## 2019-01-09 NOTE — TELEPHONE ENCOUNTER
Please call patient-    Her insurance is now also saying it does not cover trulicity.  I will try sending the last weekly injectable medication bydureon to her insurance to see if there is coverage.  If not, I will attempt a prior auth.  The injection of bydureon is a little bit different from the others.  She can come in for nurse teaching on how to do injections or watch a video at the [email or web address].  The link to diabetes education is under Health Resources on the home page.    Thanks,  Gina Maldonado, CNP

## 2019-01-09 NOTE — TELEPHONE ENCOUNTER
Called [facility] pharmacy  Bydureon is not covered and needs PA    They will be sending the PA request    Please advise which med will you be doing the PA on   Will call patient after this is clarified    Catia Hu RN

## 2019-01-11 ENCOUNTER — TELEPHONE (OUTPATIENT)
Dept: FAMILY MEDICINE | Facility: CLINIC | Age: 34
End: 2019-01-11

## 2019-01-11 NOTE — TELEPHONE ENCOUNTER
Hello,    This drug Bydueron BCISE 2mg/0.85mg requires prior authorization. Please contact insurance at [phone] .   Patient's ID [id number] .    Please contact pharmacy when prior authorization has been approved. We will contact patient when order has been filled.     RxBin:[id number]   RxGroup:[id number]  RxPCN: N/A    Thanks,   Zara Johnson, Pharmacy Tech  [place]/ [facility]

## 2019-01-11 NOTE — TELEPHONE ENCOUNTER
Prior Authorization Retail Medication Request    Medication/Dose: Bydueron BCISE 2mg/0.85mg  ICD code (if different than what is on RX): Type 2 diabetes mellitus without complication, without long-term current use of insulin (H) [E11.9]  Previously Tried and Failed:  Rationale:      Insurance Name:  See below  Insurance ID:        Pharmacy Information (if different than what is on RX)  Name:  [facility] Y[facility]  [id number] [facility]  [place] [address]  Phone: [phone] Fax: [phone]

## 2019-01-14 NOTE — TELEPHONE ENCOUNTER
Prior Authorization Approval    Authorization Effective Date: 12/15/2018  Authorization Expiration Date: 1/13/2022  Medication: Trulicity 0.75 mg/0.5 ml soln requires a prior authorization - APPROVED   Approved Dose/Quantity:   Reference #:     Insurance Company: [employer] - Phone [phone] Fax [phone]  Expected CoPay:       CoPay Card Available:      Foundation Assistance Needed:    Which Pharmacy is filling the prescription (Not needed for infusion/clinic administered): [facility] [place] [id number] [facility] [place]armacy Notified: Yes  Patient Notified: Yes

## 2019-01-16 NOTE — TELEPHONE ENCOUNTER
Central Prior Authorization Team   Phone: [phone]    PA Initiation    Medication: Bydueron BCISE 2mg/0.85mg  Insurance Company: [employer] - Phone [phone] Fax [phone]  Pharmacy Filling the Rx: [facility] - [address] [facility]  Filling Pharmacy Phone: [phone]  Filling Pharmacy Fax: [phone]  Start Date: 1/16/2019    INITIATED VIA PHONE.

## 2019-01-17 NOTE — TELEPHONE ENCOUNTER
Prior Authorization Approval    Authorization Effective Date: 12/18/2018  Authorization Expiration Date: 1/16/2022  Medication: Bydueron BCISE 2mg/0.85mg-APPROVED  Approved Dose/Quantity:    Reference #: [id number]   Insurance Company: [employer] - Phone [phone] Fax [phone]  Expected CoPay:       CoPay Card Available:      Foundation Assistance Needed:    Which Pharmacy is filling the prescription (Not needed for infusion/clinic administered): [facility] - [address] [facility]  Pharmacy Notified: Yes  Patient Notified: Yes

## 2019-01-22 ENCOUNTER — OFFICE VISIT (OUTPATIENT)
Dept: SURGERY | Facility: CLINIC | Age: 34
End: 2019-01-22

## 2019-01-22 ENCOUNTER — ANESTHESIA EVENT (OUTPATIENT)
Dept: SURGERY | Facility: CLINIC | Age: 34
End: 2019-01-22
Payer: OTHER MISCELLANEOUS

## 2019-01-22 VITALS
BODY MASS INDEX: 39.74 KG/M2 | HEIGHT: 60 IN | OXYGEN SATURATION: 98 % | TEMPERATURE: 98 F | RESPIRATION RATE: 18 BRPM | HEART RATE: 82 BPM | WEIGHT: 202.4 LBS | DIASTOLIC BLOOD PRESSURE: 85 MMHG | SYSTOLIC BLOOD PRESSURE: 120 MMHG

## 2019-01-22 DIAGNOSIS — Z01.818 PREOP EXAMINATION: Primary | ICD-10-CM

## 2019-01-22 RX ORDER — DULAGLUTIDE 0.75 MG/.5ML
INJECTION, SOLUTION SUBCUTANEOUS
COMMUNITY
Start: 2019-01-18 | End: 2019-03-01

## 2019-01-22 RX ORDER — CALCIUM CARBONATE 500 MG/1
1 TABLET, CHEWABLE ORAL PRN
COMMUNITY
End: 2019-03-04

## 2019-01-22 ASSESSMENT — LIFESTYLE VARIABLES: TOBACCO_USE: 1

## 2019-01-22 ASSESSMENT — MIFFLIN-ST. JEOR: SCORE: 1540.37

## 2019-01-22 NOTE — ANESTHESIA PREPROCEDURE EVALUATION
Anesthesia Pre-Procedure Evaluation    Patient: Santos Bagley   MRN:     [id number] Gender:   female   Age:    33 year old :      1985        Preoperative Diagnosis: Chronic Left-Sided Low Back Pain With Left-Sided Sciatica   Procedure(s):  Minimally Invasive Left Lumbar 5-Sacral 1 Microdiscectomy     Past Medical History:   Diagnosis Date     Cervical high risk HPV (human papillomavirus) test positive 2018    See Problem List     DM (diabetes mellitus) (H)      Lumbar radiculopathy      Severe Cervical Dysplasia     LEEP, Needs annual pap smear screening      Past Surgical History:   Procedure Laterality Date     TUBAL LIGATION            Anesthesia Evaluation     . Pt has had prior anesthetic. Type: General    No history of anesthetic complications          ROS/MED HX    ENT/Pulmonary:     (+)other ENT- uvula surgically absent, tobacco use, Past use , . .    Neurologic:     (+)other neuro Lumbar radiculopathy    Cardiovascular:     (+) Dyslipidemia, ----. : . . . :. . No previous cardiac testing      (-) taking anticoagulants/antiplatelets   METS/Exercise Tolerance:  >4 METS   Hematologic:  - neg hematologic  ROS       Musculoskeletal:  - neg musculoskeletal ROS       GI/Hepatic:  - neg GI/hepatic ROS      (-) GERD and liver disease   Renal/Genitourinary:  - ROS Renal section negative    (-) renal disease   Endo:     (+) type II DM Last HgA1c: 5.5 date: 19 Not using insulin - not using insulin pump Normal glucose range:  Obesity, .      Psychiatric:  - neg psychiatric ROS       Infectious Disease:  - neg infectious disease ROS       Malignancy:      - no malignancy   Other:    (+) C-spine cleared: N/A, H/O Chronic Pain,no other significant disability                        PHYSICAL EXAM:   Mental Status/Neuro: A/A/O; Age Appropriate   Airway: Facies: Feasible  Mallampati: III  Mouth/Opening: Full  TM distance: > 6 cm  Neck ROM: Full   Respiratory: Auscultation: CTAB     Resp. Rate:  "Normal     Resp. Effort: Normal      CV: Rhythm: Regular  Heart: Normal Sounds   Comments:      Dental: Normal                  Lab Results   Component Value Date    WBC 8.6 09/25/2018    HGB 15.3 09/25/2018    HCT 45.5 09/25/2018     09/25/2018     01/04/2019    POTASSIUM 4.2 01/04/2019    CHLORIDE 107 01/04/2019    CO2 25 01/04/2019    BUN 19 01/04/2019    CR 0.62 01/04/2019    GLC 78 01/04/2019    OLIVE 9.3 01/04/2019    TSH 1.07 03/22/2018    HCG  10/26/2010     Negative   This test provides a presumptive diagnosis of pregnancy or non-pregnancy. A   confirmed pregnancy diagnosis should only be made by a physician after all   clinical and laboratory findings have been evaluated.       Preop Vitals  BP Readings from Last 3 Encounters:   01/04/19 120/60   12/27/18 129/81   12/03/18 126/70    Pulse Readings from Last 3 Encounters:   01/04/19 86   12/27/18 90   12/03/18 92      Resp Readings from Last 3 Encounters:   01/04/19 20   12/03/18 20   11/05/18 20    SpO2 Readings from Last 3 Encounters:   01/04/19 100%   12/27/18 97%   12/03/18 99%      Temp Readings from Last 1 Encounters:   01/04/19 98  F (36.7  C) (Oral)    Ht Readings from Last 1 Encounters:   01/04/19 1.537 m (5' 0.5\")      Wt Readings from Last 1 Encounters:   01/04/19 91.9 kg (202 lb 8 oz)    Estimated body mass index is 38.9 kg/m  as calculated from the following:    Height as of 1/4/19: 1.537 m (5' 0.5\").    Weight as of 1/4/19: 91.9 kg (202 lb 8 oz).     LDA:            Assessment:   ASA SCORE: 2    NPO Status: > 6 hours since completed Solid Foods   Documentation: H&P complete; Preop Testing complete; Consents complete   Proceeding: Proceed without further delay     Plan:   Anes. Type:  General                          PONV Management:Adult Risk Factors: Female     CONSENT: Direct conversation   Plan and risks discussed with: Patient   Blood Products: Consented (ALL Blood Products)       Comments for Plan/Consent:  Airway exam:   MP: " III  MO: >3 FB  TM: > 3 FB  ROM: Full  Impression: Potentially challenging, will have videolaryngoscope available    General with ETT . PIVx2. Standard ASA monitors, +/- invasive BP monitoring with arterial line (discussed with patient). IV opioids, IV and PO adjuncts PRN. IV antiemetics. PACU postop.    General anesthetic risks discussed included sore throat, voice hoarseness, injury to vocal cords, throat, mouth, teeth, tongue, and lips from intubation; nausea/vomiting; cardiac arrest, respiratory complications, MI, stroke, blood clots, death.     Transfusion risks discussed include infection, and complications involving the heart and lungs (transfusion reaction).    Presented opportunity to answer patient/family/POA/guardian questions. Questions addressed.      ###Note may not be generated accurately due to auto-population of certain parts of the note upon opening for editing###                    PAC Discussion and Assessment    ASA Classification: 2  Case is suitable for: [place]  Anesthetic techniques and relevant risks discussed: GA  Invasive monitoring and risk discussed: No  Types:   Possibility and Risk of blood transfusion discussed: No  NPO instructions given:   Additional anesthetic preparation and risks discussed:   Needs early admission to pre-op area:   Other:     PAC Resident/NP Anesthesia Assessment:  Santos Bagley is a 33 year old female scheduled to undergo Left Lumber 5/Sacral 1 minimally invasive microdiscectomy with Dr. Arevalo on 2/5/19. She has the following specific operative considerations:   - RCRI : No serious cardiac risks.    - VTE risk: 0.26%  - LEESA # of risks 1/8 = Low risk  - Risk of PONV score = 3.  If > 2, anti-emetic intervention recommended. If 3 or > anti emetic intervention recommended with two or more meds    Lumbar radiculopathy. Above procedure now planned. Will take Gabapentin on DOS.     No cardiac history, symptoms. or meds. Good exercise tolerance.      Former smoker. 1/2  PPD X 10 years, quit in 2018. No pulmonary symptoms.     DM II. Last A1C 5.5. Takes Trulicity weekly on Mondays.       Patient was reviewed by Dr Lee.      Reviewed and Signed by PAC Mid-Level Provider/Resident  Mid-Level Provider/Resident: CIPRIANO Branch CNS  Date: 1/22/19  Time: 8:52am    Attending Anesthesiologist Anesthesia Assessment:  33 year old for left L5/S1 minimally invasive microdiscectomy. Patient has no significant cardiac or pulmonary disease.     Patient/case discussed with CHRISTOPH/resident; agree with above assessment. No need to see patient. Patient is appropriate for the planned procedure without further work-up or medical management.      Reviewed and Signed by PAC Anesthesiologist  Anesthesiologist: lulu  Date: 1/22/2019  Time:   Pass/Fail: Pass  Disposition:     PAC Pharmacist Assessment:        Pharmacist:   Date:   Time:        CIPRIANO Mitchell

## 2019-01-22 NOTE — H&P
Pre-Operative H & P     CC:  Preoperative exam to assess for increased cardiopulmonary risk while undergoing surgery and anesthesia.    Date of Encounter: 1/22/2019  Primary Care Physician:  Gina Maldonado  Reason for visit: Lumbar radiculopathy [M54.16]  - Primary     HPI  Santos Bagley is a 33 year old female who presents for pre-operative H & P in preparation for Left Lumber 5/Sacral 1 minimally invasive microdiscectomy with Dr. Arevalo on 2/5/19 at [facility]. History is obtained from the patient.     Patient who was recently evaluated by Dr. Arevalo with concerns for left lower extremity pain which began after a work incident on 12/20/17. She was initially evaluated in June 2018 and chose to receive epidural steroid injections which did not provide lasting relief. She continues to take Ibuprofen and has more recently started on Gabapentin which has helped her, but as soon as she tries to cut down the medication, her leg pain starts again. Her imaging was reviewed and she was counseled for above procedure.     Her history is otherwise significant for DM II and obesity, however she has recently lost weight.     Past Medical History  Past Medical History:   Diagnosis Date     Cervical high risk HPV (human papillomavirus) test positive 03/22/2018    See Problem List     DM (diabetes mellitus) (H)      Lumbar radiculopathy      Severe Cervical Dysplasia 7/02    LEEP, Needs annual pap smear screening       Past Surgical History  Past Surgical History:   Procedure Laterality Date     TONSILLECTOMY  2013    uvula removed at same time     TUBAL LIGATION  2009       Hx of Blood transfusions/reactions: Denies.      Hx of abnormal bleeding or anti-platelet use: Denies.     Menstrual history: Patient's last menstrual period was 12/22/2018 (approximate).    Steroid use in the last year: Denies.     Personal or FH with difficulty with Anesthesia:  Denies.     Prior to Admission  Medications  Current Outpatient Medications   Medication Sig Dispense Refill     Acetaminophen (TYLENOL PO) Take 1,000 mg by mouth as needed        calcium carbonate (TUMS) 500 MG chewable tablet Take 1 chew tab by mouth as needed for heartburn       gabapentin (NEURONTIN) 100 MG capsule Take 1 capsule (100 mg) by mouth 3 times daily 270 capsule 1     IBUPROFEN PO Take 800 mg by mouth as needed        blood glucose (NO BRAND SPECIFIED) lancets standard Use to test blood sugar one times daily or as directed. 100 each 11     blood glucose (NO BRAND SPECIFIED) lancing device Use to test blood sugars one times daily or as directed. 1 each 0     blood glucose monitoring (NO BRAND SPECIFIED) meter device kit Use to test blood sugar one times daily or as directed. 1 kit 0     blood glucose monitoring (NO BRAND SPECIFIED) test strip Use to test blood sugars one times daily or as directed 100 strip 3     insulin pen needle (31G X 6 MM) 31G X 6 MM miscellaneous Use once weekly or as directed. (Patient not taking: Reported on 1/4/2019) 30 each 1     TRULICITY 0.75 MG/0.5ML pen every 7 days         Allergies  Allergies   Allergen Reactions     Nkda [No Known Drug Allergies]        Social History  Social History     Socioeconomic History     Marital status:      Spouse name: Not on file     Number of children: Not on file     Years of education: Not on file     Highest education level: Not on file   Social Needs     Financial resource strain: Not on file     Food insecurity - worry: Not on file     Food insecurity - inability: Not on file     Transportation needs - medical: Not on file     Transportation needs - non-medical: Not on file   Occupational History     Not on file   Tobacco Use     Smoking status: Former Smoker     Packs/day: 0.50     Years: 12.00     Pack years: 6.00     Types: Cigarettes     Smokeless tobacco: Never Used   Substance and Sexual Activity     Alcohol use: No     Drug use: No     Sexual activity:  Yes     Partners: Male   Other Topics Concern     Parent/sibling w/ CABG, MI or angioplasty before 65F 55M? Not Asked   Social History Narrative     Not on file       Family History  Family History   Family history unknown: Yes       Review of Systems  ROS/MED HX  The complete review of systems is negative other than noted in the HPI or here.   ENT/Pulmonary: uvula surgically absent    (+)tobacco use, Past use , . .    Neurologic:     (+)other neuro Lumbar radiculopathy    Cardiovascular:     (+) Dyslipidemia, ----. : . . . :. . No previous cardiac testing      (-) taking anticoagulants/antiplatelets   METS/Exercise Tolerance:  >4    Hematologic:  - neg hematologic  ROS       Musculoskeletal:  - neg musculoskeletal ROS       GI/Hepatic:  - neg GI/hepatic ROS       Renal/Genitourinary:  - ROS Renal section negative       Endo:     (+) type II DM Last HgA1c: 5.5 date: 1/4/19 Not using insulin - not using insulin pump Obesity, .      Psychiatric:  - neg psychiatric ROS       Infectious Disease:  - neg infectious disease ROS       Malignancy:         Other:    (+) C-spine cleared: N/A, H/O Chronic Pain,no other significant disability            PHYSICAL EXAM:   Mental Status/Neuro: A/A/O; Age Appropriate   Airway: Facies: Feasible  Mallampati: II  Mouth/Opening: Full  TM distance: > 6 cm  Neck ROM: Full   Respiratory: Auscultation: CTAB     Resp. Rate: Normal     Resp. Effort: Normal      CV: Rhythm: Regular  Heart: Normal Sounds   Comments:    Preop Vitals  BP Readings from Last 3 Encounters:   01/04/19 120/60   12/27/18 129/81   12/03/18 126/70    Pulse Readings from Last 3 Encounters:   01/04/19 86   12/27/18 90   12/03/18 92      Resp Readings from Last 3 Encounters:   01/04/19 20   12/03/18 20   11/05/18 20    SpO2 Readings from Last 3 Encounters:   01/04/19 100%   12/27/18 97%   12/03/18 99%      Temp Readings from Last 1 Encounters:   01/04/19 98  F (36.7  C) (Oral)    Ht Readings from Last 1 Encounters:  "  01/04/19 1.537 m (5' 0.5\")      Wt Readings from Last 1 Encounters:   01/04/19 91.9 kg (202 lb 8 oz)    Estimated body mass index is 38.9 kg/m  as calculated from the following:    Height as of 1/4/19: 1.537 m (5' 0.5\").    Weight as of 1/4/19: 91.9 kg (202 lb 8 oz).       Temp: 98  F (36.7  C) Temp src: Oral BP: 120/85 Pulse: 82   Resp: 18 SpO2: 98 %         202 lbs 6.4 oz  5' .05\"   Body mass index is 39.46 kg/m .       Physical Exam  Constitutional: Awake, alert, cooperative, no apparent distress, and appears stated age.  Eyes: Pupils equal, round and reactive to light, extra ocular muscles intact, sclera clear, conjunctiva normal.  HENT: Normocephalic, oral pharynx with moist mucus membranes, good dentition. Uvula surgically absent. No goiter appreciated.   Respiratory: Clear to auscultation bilaterally, no crackles or wheezing. No cough or obvious dyspnea.  Cardiovascular: Regular rate and rhythm, normal S1 and S2, and no murmur noted.  Carotids +2, no bruits. No edema. Palpable pulses to radial  DP and PT arteries.   GI: Normal bowel sounds, soft, non-distended, non-tender, no masses palpated, no hepatosplenomegaly.    Lymph/Hematologic: No cervical lymphadenopathy and no supraclavicular lymphadenopathy.  Genitourinary:  Deferred.   Skin: Warm and dry.  No rashes at anticipated surgical site.   Musculoskeletal: Full ROM of neck. There is no redness, warmth, or swelling of the joints. Gross motor strength is normal.    Neurologic: Awake, alert, oriented to name, place and time. Cranial nerves II-XII are grossly intact. Gait is normal.   Neuropsychiatric: Calm, cooperative. Normal affect.     Labs: (personally reviewed)  Lab Results   Component Value Date    WBC 8.6 09/25/2018     Lab Results   Component Value Date    RBC 5.10 09/25/2018     Lab Results   Component Value Date    HGB 15.3 09/25/2018     Lab Results   Component Value Date    HCT 45.5 09/25/2018     Lab Results   Component Value Date    MCV 89 " 09/25/2018     Lab Results   Component Value Date    MCH 30.0 09/25/2018     Lab Results   Component Value Date    MCHC 33.6 09/25/2018     Lab Results   Component Value Date    RDW 12.5 09/25/2018     Lab Results   Component Value Date     09/25/2018     Last Comprehensive Metabolic Panel:  Sodium   Date Value Ref Range Status   01/04/2019 140 133 - 144 mmol/L Final     Potassium   Date Value Ref Range Status   01/04/2019 4.2 3.4 - 5.3 mmol/L Final     Chloride   Date Value Ref Range Status   01/04/2019 107 94 - 109 mmol/L Final     Carbon Dioxide   Date Value Ref Range Status   01/04/2019 25 20 - 32 mmol/L Final     Anion Gap   Date Value Ref Range Status   01/04/2019 8 3 - 14 mmol/L Final     Glucose   Date Value Ref Range Status   01/04/2019 78 70 - 99 mg/dL Final     Comment:     Non Fasting     Urea Nitrogen   Date Value Ref Range Status   01/04/2019 19 7 - 30 mg/dL Final     Creatinine   Date Value Ref Range Status   01/04/2019 0.62 0.52 - 1.04 mg/dL Final     GFR Estimate   Date Value Ref Range Status   01/04/2019 >90 >60 mL/min/[1.73_m2] Final     Comment:     Non  GFR Calc  Starting 12/18/2018, serum creatinine based estimated GFR (eGFR) will be   calculated using the Chronic Kidney Disease Epidemiology Collaboration   (CKD-EPI) equation.       Calcium   Date Value Ref Range Status   01/04/2019 9.3 8.5 - 10.1 mg/dL Final   1/4/19 A1c 5.5    EKG: Not indicated.   MR Lumbar spine 9/10/18                                                                     IMPRESSION: Moderate-sized central to left paramedian partially    extruded L5-S1 disc protrusion in continuity with the left S1 nerve    root but not causing any displacement. Finding has not significantly    changed since prior exam.    Imaging reviewed by this provider    Outside records reviewed from: Care Everywhere    ASSESSMENT and PLAN  Santos Bagley is a 33 year old female scheduled to undergo Left Lumber 5/Sacral 1 minimally  invasive microdiscectomy with Dr. Arevalo on 2/5/19. She has the following specific operative considerations:   - RCRI : No serious cardiac risks.    - Anesthesia considerations:  Refer to PAC assessment in anesthesia records  - VTE risk: 0.26%  - LEESA # of risks 1/8 = Low risk  - Risk of PONV score = 3.  If > 2, anti-emetic intervention recommended. If 3 or > anti emetic intervention recommended with two or more meds    Lumbar radiculopathy. Above procedure now planned. Will take Gabapentin on DOS.     No cardiac history, symptoms. or meds. Good exercise tolerance.      Former smoker. 1/2 PPD X 10 years, quit in 2018. No pulmonary symptoms.     DM II. Last A1C 5.5. Takes Trulicity weekly on Mondays.     .Arrival time, NPO, shower and medication instructions provided by nursing staff today. Preparing For Your Surgery handout given.    Patient was reviewed by Dr Lee.    CIPRIANO Mitchell CNS  Preoperative Assessment Center  [facility]  Clinic and Surgery Center  Phone: [phone]  Fax: [phone]

## 2019-01-22 NOTE — PATIENT INSTRUCTIONS
Preparing for Your Surgery      Name:  Santos Bagley   MRN:  [id number]   :  1985   Today's Date:  2019     Arriving for surgery:  Surgery date:  19  Arrival time:  7AM  Please come to:       [facility] [address]  [address] [place]  [address]    -   parking is available in front of the hospital from 5:15 am to 8:00 pm    -  Stop at the Information Desk in the lobby    -   Inform the information person that you are here for surgery. An escort to 3c will be provided. If you would not like an escort, please proceed to 3C on the 3rd floor. [phone]     Influenza visitor restrictions are in force at this time.  [facility] is prohibiting the following visitors:  -Any sick persons regardless of age  -Anyone with known exposure to a communicable illness  -Anyone under the age of 5    What can I eat or drink?  -  You may have solid food or milk products until 8 hours prior to your surgery. Midnight  -  You may have water, apple juice or 7up/Sprite until 2 hours prior to your surgery. 19, 7AM    Which medicines can I take?  Stop Aspirin one week prior to surgery.  Hold Ibuprofen and Naproxen for 24 hours prior to surgery.   -  Do NOT take these medications in the morning, the day of surgery:    Calcium      -  Please take these medications the day of surgery:    Gabapentin     How do I prepare myself?  -  Take two showers: one the night before surgery; and one the morning of surgery.         Use Scrubcare or Hibiclens to wash from neck down.  You may use your own shampoo and conditioner. No other hair products.   -  Do NOT use lotion, powder, deodorant, or antiperspirant the day of your surgery.  -  Do NOT wear any makeup, fingernail polish or jewelry.  - Do not bring your own medications to the hospital, except for inhalers and eye drops.  -  Bring your ID and insurance card.    Questions or Concerns:  -If you have questions or concerns regarding the  day of surgery, please call [phone].     -For questions after surgery please call your surgeons office.       AFTER YOUR SURGERY  Breathing exercises   Breathing exercises help you recover faster. Take deep breaths and let the air out slowly. This will:     Help you wake up after surgery.    Help prevent complications like pneumonia.  Preventing complications will help you go home sooner.   Nausea and vomiting   You may feel sick to your stomach after surgery; if so, let your nurse know.    Pain control:  After surgery, you may have pain. Our goal is to help you manage your pain. Pain medicine will help you feel comfortable enough to do activities that will help you heal.  These activities may include breathing exercises, walking and physical therapy.   To help your health care team treat your pain we will ask: 1) If you have pain  2) where it is located 3) describe your pain in your words  Methods of pain control include medications given by mouth, vein or by nerve block for some surgeries.  Sequential Compression Device (SCD) or Pneumo Boots:  You may need to wear SCD S on your legs or feet. These are wraps connected to a machine that pumps in air and releases it. The repeated pumping helps prevent blood clots from forming.

## 2019-02-05 ENCOUNTER — ANESTHESIA (OUTPATIENT)
Dept: SURGERY | Facility: CLINIC | Age: 34
End: 2019-02-05
Payer: OTHER MISCELLANEOUS

## 2019-02-05 ENCOUNTER — APPOINTMENT (OUTPATIENT)
Dept: GENERAL RADIOLOGY | Facility: CLINIC | Age: 34
End: 2019-02-05
Attending: NEUROLOGICAL SURGERY
Payer: OTHER MISCELLANEOUS

## 2019-02-05 ENCOUNTER — HOSPITAL ENCOUNTER (OUTPATIENT)
Facility: CLINIC | Age: 34
Discharge: HOME | End: 2019-02-05
Attending: NEUROLOGICAL SURGERY | Admitting: NEUROLOGICAL SURGERY
Payer: OTHER MISCELLANEOUS

## 2019-02-05 VITALS
TEMPERATURE: 98.5 F | WEIGHT: 201.94 LBS | BODY MASS INDEX: 39.65 KG/M2 | SYSTOLIC BLOOD PRESSURE: 110 MMHG | HEART RATE: 73 BPM | DIASTOLIC BLOOD PRESSURE: 68 MMHG | RESPIRATION RATE: 18 BRPM | OXYGEN SATURATION: 97 % | HEIGHT: 60 IN

## 2019-02-05 DIAGNOSIS — M54.16 LUMBAR RADICULOPATHY: Primary | ICD-10-CM

## 2019-02-05 LAB
ABO + RH BLD: NORMAL
ABO + RH BLD: NORMAL
BLD GP AB SCN SERPL QL: NORMAL
BLOOD BANK CMNT PATIENT-IMP: NORMAL
GLUCOSE BLDC GLUCOMTR-MCNC: 93 MG/DL (ref 70–99)
GLUCOSE BLDC GLUCOMTR-MCNC: 96 MG/DL (ref 70–99)
GLUCOSE BLDC GLUCOMTR-MCNC: 98 MG/DL (ref 70–99)
HCG UR QL: NEGATIVE
SPECIMEN EXP DATE BLD: NORMAL

## 2019-02-05 PROCEDURE — 25000125 ZZHC RX 250: Performed by: NEUROLOGICAL SURGERY

## 2019-02-05 PROCEDURE — 25000128 H RX IP 250 OP 636: Performed by: STUDENT IN AN ORGANIZED HEALTH CARE EDUCATION/TRAINING PROGRAM

## 2019-02-05 PROCEDURE — 25000125 ZZHC RX 250: Performed by: STUDENT IN AN ORGANIZED HEALTH CARE EDUCATION/TRAINING PROGRAM

## 2019-02-05 PROCEDURE — 86901 BLOOD TYPING SEROLOGIC RH(D): CPT | Performed by: ANESTHESIOLOGY

## 2019-02-05 PROCEDURE — 71000027 ZZH RECOVERY PHASE 2 EACH 15 MINS: Performed by: NEUROLOGICAL SURGERY

## 2019-02-05 PROCEDURE — 27210794 ZZH OR GENERAL SUPPLY STERILE: Performed by: NEUROLOGICAL SURGERY

## 2019-02-05 PROCEDURE — 71000014 ZZH RECOVERY PHASE 1 LEVEL 2 FIRST HR: Performed by: NEUROLOGICAL SURGERY

## 2019-02-05 PROCEDURE — 36000064 ZZH SURGERY LEVEL 4 EA 15 ADDTL MIN - UMMC: Performed by: NEUROLOGICAL SURGERY

## 2019-02-05 PROCEDURE — 36000066 ZZH SURGERY LEVEL 4 W FLUORO 1ST 30 MIN - UMMC: Performed by: NEUROLOGICAL SURGERY

## 2019-02-05 PROCEDURE — 82962 GLUCOSE BLOOD TEST: CPT

## 2019-02-05 PROCEDURE — 81025 URINE PREGNANCY TEST: CPT | Performed by: NEUROLOGICAL SURGERY

## 2019-02-05 PROCEDURE — 37000008 ZZH ANESTHESIA TECHNICAL FEE, 1ST 30 MIN: Performed by: NEUROLOGICAL SURGERY

## 2019-02-05 PROCEDURE — 40000277 XR SURGERY CARM FLUORO LESS THAN 5 MIN W STILLS: Mod: TC

## 2019-02-05 PROCEDURE — 71000015 ZZH RECOVERY PHASE 1 LEVEL 2 EA ADDTL HR: Performed by: NEUROLOGICAL SURGERY

## 2019-02-05 PROCEDURE — 36415 COLL VENOUS BLD VENIPUNCTURE: CPT | Performed by: ANESTHESIOLOGY

## 2019-02-05 PROCEDURE — 40000170 ZZH STATISTIC PRE-PROCEDURE ASSESSMENT II: Performed by: NEUROLOGICAL SURGERY

## 2019-02-05 PROCEDURE — 86850 RBC ANTIBODY SCREEN: CPT | Performed by: ANESTHESIOLOGY

## 2019-02-05 PROCEDURE — 37000009 ZZH ANESTHESIA TECHNICAL FEE, EACH ADDTL 15 MIN: Performed by: NEUROLOGICAL SURGERY

## 2019-02-05 PROCEDURE — 86900 BLOOD TYPING SEROLOGIC ABO: CPT | Performed by: ANESTHESIOLOGY

## 2019-02-05 PROCEDURE — 25000132 ZZH RX MED GY IP 250 OP 250 PS 637: Performed by: STUDENT IN AN ORGANIZED HEALTH CARE EDUCATION/TRAINING PROGRAM

## 2019-02-05 PROCEDURE — 27210995 ZZH RX 272: Performed by: NEUROLOGICAL SURGERY

## 2019-02-05 PROCEDURE — 25000128 H RX IP 250 OP 636: Performed by: NEUROLOGICAL SURGERY

## 2019-02-05 PROCEDURE — 25000566 ZZH SEVOFLURANE, EA 15 MIN: Performed by: NEUROLOGICAL SURGERY

## 2019-02-05 RX ORDER — BUPIVACAINE HYDROCHLORIDE AND EPINEPHRINE 5; 5 MG/ML; UG/ML
INJECTION, SOLUTION PERINEURAL PRN
Status: DISCONTINUED | OUTPATIENT
Start: 2019-02-05 | End: 2019-02-05 | Stop reason: HOSPADM

## 2019-02-05 RX ORDER — NALOXONE HYDROCHLORIDE 0.4 MG/ML
.1-.4 INJECTION, SOLUTION INTRAMUSCULAR; INTRAVENOUS; SUBCUTANEOUS
Status: DISCONTINUED | OUTPATIENT
Start: 2019-02-05 | End: 2019-02-05 | Stop reason: HOSPADM

## 2019-02-05 RX ORDER — POLYETHYLENE GLYCOL 3350 17 G/17G
1 POWDER, FOR SOLUTION ORAL 2 TIMES DAILY
Qty: 60 PACKET | Refills: 0 | Status: SHIPPED | OUTPATIENT
Start: 2019-02-05 | End: 2019-03-04

## 2019-02-05 RX ORDER — ONDANSETRON 2 MG/ML
INJECTION INTRAMUSCULAR; INTRAVENOUS PRN
Status: DISCONTINUED | OUTPATIENT
Start: 2019-02-05 | End: 2019-02-05

## 2019-02-05 RX ORDER — ONDANSETRON 4 MG/1
4 TABLET, ORALLY DISINTEGRATING ORAL EVERY 30 MIN PRN
Status: DISCONTINUED | OUTPATIENT
Start: 2019-02-05 | End: 2019-02-05 | Stop reason: HOSPADM

## 2019-02-05 RX ORDER — AMOXICILLIN 250 MG
2 CAPSULE ORAL 2 TIMES DAILY
Qty: 120 TABLET | Refills: 0 | Status: SHIPPED | OUTPATIENT
Start: 2019-02-05 | End: 2019-03-01

## 2019-02-05 RX ORDER — FENTANYL CITRATE 50 UG/ML
25-50 INJECTION, SOLUTION INTRAMUSCULAR; INTRAVENOUS
Status: DISCONTINUED | OUTPATIENT
Start: 2019-02-05 | End: 2019-02-05 | Stop reason: HOSPADM

## 2019-02-05 RX ORDER — HYDROMORPHONE HYDROCHLORIDE 1 MG/ML
.3-.5 INJECTION, SOLUTION INTRAMUSCULAR; INTRAVENOUS; SUBCUTANEOUS EVERY 5 MIN PRN
Status: DISCONTINUED | OUTPATIENT
Start: 2019-02-05 | End: 2019-02-05 | Stop reason: HOSPADM

## 2019-02-05 RX ORDER — SODIUM CHLORIDE, SODIUM LACTATE, POTASSIUM CHLORIDE, CALCIUM CHLORIDE 600; 310; 30; 20 MG/100ML; MG/100ML; MG/100ML; MG/100ML
INJECTION, SOLUTION INTRAVENOUS CONTINUOUS
Status: DISCONTINUED | OUTPATIENT
Start: 2019-02-05 | End: 2019-02-05 | Stop reason: HOSPADM

## 2019-02-05 RX ORDER — GABAPENTIN 300 MG/1
300 CAPSULE ORAL ONCE
Status: COMPLETED | OUTPATIENT
Start: 2019-02-05 | End: 2019-02-05

## 2019-02-05 RX ORDER — PROPOFOL 10 MG/ML
INJECTION, EMULSION INTRAVENOUS PRN
Status: DISCONTINUED | OUTPATIENT
Start: 2019-02-05 | End: 2019-02-05

## 2019-02-05 RX ORDER — OXYCODONE HYDROCHLORIDE 5 MG/1
5 TABLET ORAL EVERY 6 HOURS PRN
Qty: 30 TABLET | Refills: 0 | Status: SHIPPED | OUTPATIENT
Start: 2019-02-05 | End: 2019-03-01

## 2019-02-05 RX ORDER — FENTANYL CITRATE 50 UG/ML
INJECTION, SOLUTION INTRAMUSCULAR; INTRAVENOUS PRN
Status: DISCONTINUED | OUTPATIENT
Start: 2019-02-05 | End: 2019-02-05

## 2019-02-05 RX ORDER — ONDANSETRON 2 MG/ML
4 INJECTION INTRAMUSCULAR; INTRAVENOUS EVERY 30 MIN PRN
Status: DISCONTINUED | OUTPATIENT
Start: 2019-02-05 | End: 2019-02-05 | Stop reason: HOSPADM

## 2019-02-05 RX ORDER — ACETAMINOPHEN 325 MG/1
975 TABLET ORAL ONCE
Status: COMPLETED | OUTPATIENT
Start: 2019-02-05 | End: 2019-02-05

## 2019-02-05 RX ORDER — SODIUM CHLORIDE, SODIUM LACTATE, POTASSIUM CHLORIDE, CALCIUM CHLORIDE 600; 310; 30; 20 MG/100ML; MG/100ML; MG/100ML; MG/100ML
INJECTION, SOLUTION INTRAVENOUS CONTINUOUS PRN
Status: DISCONTINUED | OUTPATIENT
Start: 2019-02-05 | End: 2019-02-05

## 2019-02-05 RX ORDER — LIDOCAINE 40 MG/G
CREAM TOPICAL
Status: DISCONTINUED | OUTPATIENT
Start: 2019-02-05 | End: 2019-02-05 | Stop reason: HOSPADM

## 2019-02-05 RX ORDER — CEFAZOLIN SODIUM 2 G/100ML
2 INJECTION, SOLUTION INTRAVENOUS
Status: COMPLETED | OUTPATIENT
Start: 2019-02-05 | End: 2019-02-05

## 2019-02-05 RX ORDER — CEFAZOLIN SODIUM 1 G/3ML
1 INJECTION, POWDER, FOR SOLUTION INTRAMUSCULAR; INTRAVENOUS SEE ADMIN INSTRUCTIONS
Status: DISCONTINUED | OUTPATIENT
Start: 2019-02-05 | End: 2019-02-05 | Stop reason: HOSPADM

## 2019-02-05 RX ADMIN — FENTANYL CITRATE 25 MCG: 50 INJECTION, SOLUTION INTRAMUSCULAR; INTRAVENOUS at 13:06

## 2019-02-05 RX ADMIN — SUGAMMADEX 200 MG: 100 INJECTION, SOLUTION INTRAVENOUS at 11:52

## 2019-02-05 RX ADMIN — FENTANYL CITRATE 100 MCG: 50 INJECTION, SOLUTION INTRAMUSCULAR; INTRAVENOUS at 09:17

## 2019-02-05 RX ADMIN — PHENYLEPHRINE HYDROCHLORIDE 100 MCG: 10 INJECTION, SOLUTION INTRAMUSCULAR; INTRAVENOUS; SUBCUTANEOUS at 10:42

## 2019-02-05 RX ADMIN — PROPOFOL 180 MG: 10 INJECTION, EMULSION INTRAVENOUS at 09:17

## 2019-02-05 RX ADMIN — FENTANYL CITRATE 50 MCG: 50 INJECTION, SOLUTION INTRAMUSCULAR; INTRAVENOUS at 10:31

## 2019-02-05 RX ADMIN — PROCHLORPERAZINE EDISYLATE 5 MG: 5 INJECTION INTRAMUSCULAR; INTRAVENOUS at 12:55

## 2019-02-05 RX ADMIN — PHENYLEPHRINE HYDROCHLORIDE 100 MCG: 10 INJECTION, SOLUTION INTRAMUSCULAR; INTRAVENOUS; SUBCUTANEOUS at 09:19

## 2019-02-05 RX ADMIN — ROCURONIUM BROMIDE 20 MG: 10 INJECTION INTRAVENOUS at 10:30

## 2019-02-05 RX ADMIN — PHENYLEPHRINE HYDROCHLORIDE 100 MCG: 10 INJECTION, SOLUTION INTRAMUSCULAR; INTRAVENOUS; SUBCUTANEOUS at 09:36

## 2019-02-05 RX ADMIN — HYDROMORPHONE HYDROCHLORIDE 0.3 MG: 1 INJECTION, SOLUTION INTRAMUSCULAR; INTRAVENOUS; SUBCUTANEOUS at 11:16

## 2019-02-05 RX ADMIN — GABAPENTIN 300 MG: 300 CAPSULE ORAL at 08:32

## 2019-02-05 RX ADMIN — ONDANSETRON 4 MG: 2 INJECTION INTRAMUSCULAR; INTRAVENOUS at 11:27

## 2019-02-05 RX ADMIN — FENTANYL CITRATE 50 MCG: 50 INJECTION, SOLUTION INTRAMUSCULAR; INTRAVENOUS at 09:38

## 2019-02-05 RX ADMIN — PHENYLEPHRINE HYDROCHLORIDE 100 MCG: 10 INJECTION, SOLUTION INTRAMUSCULAR; INTRAVENOUS; SUBCUTANEOUS at 09:25

## 2019-02-05 RX ADMIN — SODIUM CHLORIDE, POTASSIUM CHLORIDE, SODIUM LACTATE AND CALCIUM CHLORIDE: 600; 310; 30; 20 INJECTION, SOLUTION INTRAVENOUS at 12:36

## 2019-02-05 RX ADMIN — FENTANYL CITRATE 25 MCG: 50 INJECTION, SOLUTION INTRAMUSCULAR; INTRAVENOUS at 12:51

## 2019-02-05 RX ADMIN — HYDROMORPHONE HYDROCHLORIDE 0.2 MG: 1 INJECTION, SOLUTION INTRAMUSCULAR; INTRAVENOUS; SUBCUTANEOUS at 11:35

## 2019-02-05 RX ADMIN — FENTANYL CITRATE 50 MCG: 50 INJECTION, SOLUTION INTRAMUSCULAR; INTRAVENOUS at 09:19

## 2019-02-05 RX ADMIN — ONDANSETRON 4 MG: 2 INJECTION INTRAMUSCULAR; INTRAVENOUS at 12:27

## 2019-02-05 RX ADMIN — SODIUM CHLORIDE, POTASSIUM CHLORIDE, SODIUM LACTATE AND CALCIUM CHLORIDE: 600; 310; 30; 20 INJECTION, SOLUTION INTRAVENOUS at 09:11

## 2019-02-05 RX ADMIN — ROCURONIUM BROMIDE 10 MG: 10 INJECTION INTRAVENOUS at 11:02

## 2019-02-05 RX ADMIN — CEFAZOLIN SODIUM 2 G: 2 INJECTION, SOLUTION INTRAVENOUS at 09:45

## 2019-02-05 RX ADMIN — ROCURONIUM BROMIDE 70 MG: 10 INJECTION INTRAVENOUS at 09:17

## 2019-02-05 RX ADMIN — ACETAMINOPHEN 975 MG: 325 TABLET, FILM COATED ORAL at 08:32

## 2019-02-05 ASSESSMENT — MIFFLIN-ST. JEOR: SCORE: 1538.12

## 2019-02-05 NOTE — ANESTHESIA POSTPROCEDURE EVALUATION
Anesthesia POST Procedure Evaluation    Patient: Santos Bagley   MRN:     [id number] Gender:   female   Age:    33 year old :      1985        Preoperative Diagnosis: Chronic Left-Sided Low Back Pain With Left-Sided Sciatica   Procedure(s):  Minimally Invasive Left Lumbar 5-Sacral 1 Microdiscectomy   Postop Comments: No value filed.       Anesthesia Type:  General    Reportable Event: NO     PAIN:        Disposition:        Comments/Narrative:  Evaluation location: PACU    Mental status: Preoperative baseline. Awake and oriented, responding to questions appropriately  Airway: Natural airway (no supplemental O2)  Pain: Well controlled/None  Nausea: None  Vitals: Stable    Disposition: Appropriate to be discharged to home             Last Anesthesia Record Vitals:  CRNA VITALS  2019 1135 - 2019 1235      2019             Resp Rate (observed):  3  (Abnormal)           Last PACU/Preop Vitals:  Vitals:    19 1440 19 1500 19 1530   BP: 106/65 106/63 110/68   Pulse:      Resp: 18 18 18   Temp: 37  C (98.6  F)  36.9  C (98.5  F)   SpO2: 94% 95% 97%         Electronically Signed By: Jeison Dee MD, 2019, 4:52 PM

## 2019-02-05 NOTE — OR NURSING
Handoff received from [facility] PACU. Pt drowsy but easy to arouse. Neuro intact, no c/o numbness/tingling noted, BP soft but increasing, denies pain and nausea resolved. O2 turned off and sat 93% RA. Waiting for Dr. Aldrich to assess pt for sign out.

## 2019-02-05 NOTE — DISCHARGE INSTRUCTIONS
Take it easy when you get home.  Remember, same day surgery DOES NOT MEAN SAME DAY RECOVERY!  Healing is a gradual process.  You will need some time to recover - you may be more tired than you realize at first.  Rest and relax for at least the first 24 hours at home.  You'll feel better and heal faster if you take good care of yourself.  [facility], [place]  Same-Day Surgery   Adult Discharge Orders & Instructions     For 24 hours after surgery    1. Get plenty of rest.  A responsible adult must stay with you for at least 24 hours after you leave the hospital.   2. Do not drive or use heavy equipment.  If you have weakness or tingling, don't drive or use heavy equipment until this feeling goes away.  3. Do not drink alcohol.  4. Avoid strenuous or risky activities.  Ask for help when climbing stairs.   5. You may feel lightheaded.  IF so, sit for a few minutes before standing.  Have someone help you get up.   6. If you have nausea (feel sick to your stomach): Drink only clear liquids such as apple juice, ginger ale, broth or 7-Up.  Rest may also help.  Be sure to drink enough fluids.  Move to a regular diet as you feel able.  7. You may have a slight fever. Call the doctor if your fever is over 100 F (37.7 C) (taken under the tongue) or lasts longer than 24 hours.  8. You may have a dry mouth, a sore throat, muscle aches or trouble sleeping.  These should go away after 24 hours.  9. Do not make important or legal decisions.   Call your doctor for any of the followin.  Signs of infection (fever, growing tenderness at the surgery site, a large amount of drainage or bleeding, severe pain, foul-smelling drainage, redness, swelling).    2. It has been over 8 to 10 hours since surgery and you are still not able to urinate (pass water).    3.  Headache for over 24 hours.    To contact a doctor, call Dr Arevalo's office at [phone] or:        [phone] and ask for the resident on call  for NEUROLOGY (answered 24 hours a day)      Emergency Department:    [facility]: [phone]       (TTY for hearing impaired: [phone])    [facility]: [phone]       (TTY for hearing impaired: [phone])    Tips for taking pain medications  To get the best pain relief possible , remember these points:      Take pain medications as directed, before pain becomes severe      Pain medication can upset your stomach: taking it with food may help      Constipation is a common side effect of pain medication. Drink plenty of  Fluids      Eat foods high in fiber. Take a stool softener  if recommended by your doctor or  Pharmacist.      Do not drink alcohol, drive or operate machinery while taking pain medications.      Ask about other ways to control pain, such as with heat, ice or relaxation.

## 2019-02-05 NOTE — BRIEF OP NOTE
[facility], [place]    Brief Operative Note    Pre-operative diagnosis: Chronic Left-Sided Low Back Pain With Left-Sided Sciatica  Post-operative diagnosis Chronic Left-Sided Low Back Pain With Left-Sided Sciatica  Procedure: Procedure(s):  Minimally Invasive Left Lumbar 5-Sacral 1 Microdiscectomy  Surgeon: Surgeon(s) and Role:     * Roberta Arevalo MD - Primary     * Dheeraj Lora MD - Resident - Assisting  Anesthesia: General   Estimated blood loss: 10cc  Drains: None  Specimens: None  Findings:   Calcified disc protruding into canal. Well decompressed at the end of the case.  Complications: None.  Implants: None.

## 2019-02-05 NOTE — OR NURSING
discharge instructions given to pt and . Both verbalized understanding of instructions. IV removed, pressure and dressing applied. VS wnl, 97% RA saturation. Denies pain or n/v. Taken to car via wheelchair. All belongings returned to pt, Rx picked up by . Lumbar incision with medipore tape c/d/i, no hematoma noted.

## 2019-02-05 NOTE — ANESTHESIA CARE TRANSFER NOTE
Patient: Santos Bagley    Procedure(s):  Minimally Invasive Left Lumbar 5-Sacral 1 Microdiscectomy    Diagnosis: Chronic Left-Sided Low Back Pain With Left-Sided Sciatica  Diagnosis Additional Information: No value filed.    Anesthesia Type:   No value filed.     Note:  Airway :Face Mask  Patient transferred to:PACU  Comments: Extubated in OR 22, transferred to PACU with oxygen, hemodynamically stable. Upon arrival to PACU, pain is nil, no nausea. SpO2 98% on 8L O2 via face mask.     Laina LathamHandoff Report: Identifed the Patient, Identified the Reponsible Provider, Reviewed the pertinent medical history, Discussed the surgical course, Reviewed Intra-OP anesthesia mangement and issues during anesthesia, Set expectations for post-procedure period and Allowed opportunity for questions and acknowledgement of understanding      Vitals: (Last set prior to Anesthesia Care Transfer)    CRNA VITALS  2/5/2019 1135 - 2/5/2019 1216      2/5/2019             Resp Rate (observed):  3  (Abnormal)                 Electronically Signed By: Laina Latham MD  February 5, 2019  12:16 PM

## 2019-02-06 NOTE — OP NOTE
Procedure Date: 02/05/2019      PREOPERATIVE DIAGNOSIS:      1) Lumbar radiculopathy  2) Chronic left lower back pain.      POSTOPERATIVE DIAGNOSIS:  1) Lumbar radiculopathy  2) Chronic left lower back pain.     PROCEDURE:  Left minimally invasive lumbar 5-sacral 1 hemilaminectomy and microdiscectomy.      ATTENDING SURGEON:  Roberta Arevalo MD, PhD      RESIDENT SURGEON:  Dheeraj Lora MD      ANESTHESIA:  General.      ESTIMATED BLOOD LOSS:  10 mL.      FINDINGS:  Calcified, chronic-appearing disc protrusion with compression of the nerve root.  Well decompressed at the end of the case.      INDICATIONS:  Ms. Bagley is a 33-year-old female who presented to Neurosurgery Clinic with complaints of left-sided radicular pain that traveled down the lateral portion of her thigh, as well as leg below the knee and involved the little toe of her left foot.  She has attempted conservative measures and unfortunately had ongoing symptoms.  Given that the patient had a large disc protrusion at the L5-S1 level, it was thought that her symptoms were related to a left S1 radiculopathy.  She was offered the above-mentioned procedure.      DESCRIPTION OF PROCEDURE:  The patient was marked and consented in the preoperative area.  The patient was brought to the operating room on a hospital bed.  General anesthesia was induced and the patient was intubated.  A Ye was placed.  The patient was then rotated prone on a Alexander frame with her arms brought forward.  We ensured that the elbows and shoulders were less than 90 degrees and all pressure points were adequately padded.      The patient was then prepped and draped in the normal sterile fashion.  A timeout was called, confirming the patient's identity as well as intended procedure and laterality.  The C-arm was then brought into the field and the smallest dilator was used to localize the level of surgery.  Using the 22 mm diameter tube as a temple, the incision was then planned. Five mL  of 0.5% bupivacaine with 1:200,000 epinephrine was injected along the intended incision line.      A #10 blade was then used to make an incision through the skin.  Using blunt dissection, the fascial layer was palpable.  Another incision was made through the fascial layer using a #10 blade.  Again, manual dissection was used to palpate the spinous process and follow it down to the lamina.  The smallest dilator was then placed.  Its location was confirmed using the C-arm.  Each enlarging dilator was then placed until the 20 mm dilator was placed.  Finally, a 22 mm unbevelled Medtronic METRx tube was placed.  Final x-ray confirmed that we were indeed at the L5-S1 level.  This concluded our spine timeout.  The snake arm was then brought to the field and the METRx tube was secured and locked in place.      The C-arm was removed from the field and the operative microscope was then brought to the field.  Monopolar cautery was then used to open the residual muscle and fully expose the bony landmarks.  The drill was then used to create a window along the transition from the spinous process to the lamina, as well as down the lamina itself.  Bony decompression was continued until the ligamentum flavum was encountered.  We then continued to use the drill to open up the bone until the ligament had been fully exposed within our working window.  A Kerrison 2 punch was then used to further thin the bone around our opening.  Using an upgoing curet, the ligamentum flavum was then detached from the superior edge of the lamina and a dissection plane was identified.  Again, Kerrison punches were used to open and remove the ligamentum flavum through this dissection plane.  Epidural fat was encountered and removed using the Kerrison punches.  The dura was then visualized.  We then continued to use the Kerrison punches to remove all epidural fat until we had a good working corridor down to the lateral space.  Zaniab was then used to  mobilize the thecal sac medially.  Using bipolar cautery, the epidural venous plexus was then cauterized and opened.  Chronic appearing disc was then immediately visible.  Using a #15 blade on an extended bayonet, the disc was then punctured and opened to the disc space.      Using a downbiting curet, the chronic disc was then pushed and leveraged back towards our open window.  We continued this procedure until we felt that the epidural space along the ventral side of the spinal canal was fully decompressed.  Large individual fragments were not removed; however, many small fragments were able to be removed using suction.  Once we were completely satisfied with our degree of decompression, we then palpated superior and inferiorly to ensure that the nerve root was fully decompressed.  We were very satisfied that this was indeed the case.  Meticulous hemostasis was then obtained.      The METRx tube was slowly withdrawn from the wound, bipolar cautery was used to maintain meticulous hemostasis.  Once the METRx tube had been removed, we then turned our attention to closure.     The fascia was closed using an 0 Vicryl suture on a UR-6 needle.  This was closed in a figure-of-eight fashion.  Once the fascia was closed, an additional 20 mL of 0.5% Marcaine with 1:200,000 epinephrine was injected into the surrounding muscle and skin.  We then continued our closure.  2-0 Vicryl sutures in an inverted interrupted fashion were then used to close the dermal layer.  The skin was closed using 3-0 Monocryl in a running subcuticular fashion.  The wound was cleaned with a wet followed by a dry sponge.  ChloraPrep was then applied to the incision.  Once dried, Exofin was then applied to the incision.  Once dried, a small Primapore was then placed over the incision.  The drapes were taken down and the patient was rotated back supine on the hospital bed.  General anesthesia was gently reversed and the patient was extubated.  The patient  was then taken to the postoperative care area for further postoperative cares.      All counts were correct at the end of the procedure x 2.  Dr. Sallie Reynolds was present and scrubbed throughout all critical portions of the case and otherwise immediately available.         SALLIE REYNOLDS MD       As dictated by NIRMALA ANGEL MD            D: 2019   T: 2019   MT: JASE      Name:     MINISTERIO BURRELL   MRN:      [id number]        Account:        [id number]   :      1985           Procedure Date: 2019      Document: [id number]

## 2019-02-12 ENCOUNTER — TELEPHONE (OUTPATIENT)
Dept: NEUROSURGERY | Facility: CLINIC | Age: 34
End: 2019-02-12

## 2019-02-12 DIAGNOSIS — M54.16 LUMBAR RADICULOPATHY: Primary | ICD-10-CM

## 2019-02-12 NOTE — TELEPHONE ENCOUNTER
Patient calling leaving a phone mail message asking for a callback stating needing help with back pain.    25/19 GAIL Minimally Invasive Left Lumbar 5-Sacral 1 Microdiscectomy    Left a detailed message asking patient to return call on voice mail to .

## 2019-02-13 ENCOUNTER — TELEPHONE (OUTPATIENT)
Dept: NEUROSURGERY | Facility: CLINIC | Age: 34
End: 2019-02-13

## 2019-02-13 DIAGNOSIS — M54.16 LUMBAR RADICULOPATHY: Primary | ICD-10-CM

## 2019-02-13 RX ORDER — GABAPENTIN 300 MG/1
CAPSULE ORAL
Qty: 120 CAPSULE | Refills: 3 | Status: SHIPPED | OUTPATIENT
Start: 2019-02-13 | End: 2019-02-15

## 2019-02-13 NOTE — TELEPHONE ENCOUNTER
PER RAÚL White : Big disc, tough surgery. Leg is going to hurt for awhile. A few weeks.     She's on only tiny doses of gabapentin. 300mg per day total.   Needs new prescrip for gabapentin.  Needs 300 mg capsules:  # 120 pills.   SI po bid for 5 days. Then 1 po TID for 5 days. Then 1po in AM and at midday, and 2 po at HS.  (this will be 4 pills per day, 1200 mg)   Give her 3 refills.     Thanks     Spoke with patient and reviewed above information .  Gabapentin filled at [facility] pharmacy per pt request.  Pt has my number for questions/concerns.    Voices understanding.

## 2019-02-14 NOTE — TELEPHONE ENCOUNTER
Prior Authorization Retail Medication Request    Medication/Dose: gabapentin 300mg  ICD code (if different than what is on RX):  [id number]  Previously Tried and Failed:  unknown  Rationale:  unknown    Insurance Name:  [employer]  Insurance ID:  [id number]      Pharmacy Information (if different than what is on RX)  Name:    Phone:

## 2019-02-15 ENCOUNTER — TELEPHONE (OUTPATIENT)
Dept: NEUROSURGERY | Facility: CLINIC | Age: 34
End: 2019-02-15

## 2019-02-15 DIAGNOSIS — M54.16 LUMBAR RADICULOPATHY: ICD-10-CM

## 2019-02-15 RX ORDER — GABAPENTIN 300 MG/1
CAPSULE ORAL
Qty: 90 CAPSULE | Refills: 3 | Status: SHIPPED | OUTPATIENT
Start: 2019-02-15 | End: 2019-04-26

## 2019-02-15 RX ORDER — GABAPENTIN 300 MG/1
300 CAPSULE ORAL 3 TIMES DAILY
Qty: 90 CAPSULE | Refills: 3 | Status: SHIPPED | OUTPATIENT
Start: 2019-02-15 | End: 2019-02-15

## 2019-02-15 RX ORDER — METHYLPREDNISOLONE 4 MG
TABLET, DOSE PACK ORAL
Qty: 21 TABLET | Refills: 0 | Status: SHIPPED | OUTPATIENT
Start: 2019-02-15 | End: 2019-03-01

## 2019-02-15 NOTE — TELEPHONE ENCOUNTER
Spoke with pharmacy,  Gabapentin 300mg Insurance coverage is for 90 tablets per month.    RX changed to Gabapentin 300mg TID, starting with BID dosing for one week, then increasing to TID dosing.  Per RAÚL Dollner can fill a Medrol dose pac for patient.    Spoke to patient explained medications ordered at pharmacy.  Pharmacy opened today until 5p, closed Sat and Sunday.      Patient voices understanding of medications and pharmacy hours.

## 2019-02-19 ENCOUNTER — OFFICE VISIT (OUTPATIENT)
Dept: NEUROSURGERY | Facility: CLINIC | Age: 34
End: 2019-02-19

## 2019-02-19 VITALS
SYSTOLIC BLOOD PRESSURE: 122 MMHG | DIASTOLIC BLOOD PRESSURE: 82 MMHG | OXYGEN SATURATION: 96 % | WEIGHT: 196 LBS | HEART RATE: 74 BPM | HEIGHT: 61 IN | BODY MASS INDEX: 37 KG/M2

## 2019-02-19 DIAGNOSIS — M54.42 CHRONIC LEFT-SIDED LOW BACK PAIN WITH LEFT-SIDED SCIATICA: Primary | ICD-10-CM

## 2019-02-19 DIAGNOSIS — G89.29 CHRONIC LEFT-SIDED LOW BACK PAIN WITH LEFT-SIDED SCIATICA: Primary | ICD-10-CM

## 2019-02-19 DIAGNOSIS — Z98.890 POST-OPERATIVE STATE: ICD-10-CM

## 2019-02-19 RX ORDER — POLYETHYLENE GLYCOL 3350 17 G/17G
POWDER, FOR SOLUTION ORAL
COMMUNITY
Start: 2019-02-05 | End: 2019-03-04

## 2019-02-19 ASSESSMENT — PAIN SCALES - GENERAL: PAINLEVEL: MODERATE PAIN (5)

## 2019-02-19 ASSESSMENT — MIFFLIN-ST. JEOR: SCORE: 1525.39

## 2019-02-19 NOTE — LETTER
"2/19/2019       RE: Santos Bagley  [address]  [place] [address]     Dear Colleague,    Thank you for referring your patient, Santos Bagley, to the [facility] NEUROSURGERY at [facility]. Please see a copy of my visit note below.    NEUROSURGERY WOUND CHECK  February 19, 2019    ATTENDING:  GAIL  PROCEDURE:  2/5/19  DIAGNOSIS:  1) Lumbar radiculopathy  2) Chronic left lower back pain.   PROCEDURE:  Left minimally invasive lumbar 5-sacral 1 hemilaminectomy and microdiscectomy.  INDICATIONS:  Ms. Bagley is a 33-year-old female who presented to Neurosurgery Clinic with complaints of left-sided radicular pain that traveled down the lateral portion of her thigh, as well as leg below the knee and involved the little toe of her left foot.  She has attempted conservative measures and unfortunately had ongoing symptoms.  Given that the patient had a large disc protrusion at the L5-S1 level, it was thought that her symptoms were related to a left S1 radiculopathy.  She was offered the above-mentioned procedure.  HPI:  Santos Bagley is a pleasant 34 year old female now 2 weeks status post the above procedure.  The procedure itself was without incident.  She recovered well and was discharged home in good condition.  Since surgery she has had some ongoing leg pain which is different from preop in that the foot and toe pain is improved. We have increased her gabapentin and started a dose pack and this is helping so far. She presents for routine post op visit    EXAM:  /82 (BP Location: Left arm)   Pulse 74   Ht 1.556 m (5' 1.25\")   Wt 88.9 kg (196 lb)   SpO2 96%   BMI 36.73 kg/m     Well developed well nourished female found seated comfortably in exam chair.  No apparent distress. She is accompanied.  A&O X3.  Mood and affect WNL. Language and fund of knowledge intact.  Is able to sit and rise independently.   She has a nicely healing incision. No sutures to " remove.    ASSESSMENT/PLAN  1. Chronic left-sided low back pain with left-sided sciatica    2. Post-operative state      Doing well now 2 weeks left minimally invasive lumbar 5-sacral 1 hemilaminectomy and microdiscectomy. Has some residual leg pain which is responding nicely to treatment.  I anticipate this will resolve over time, plan to keep her on the gabapentin minimally 6 weeks, possibly 3 months.  Will depend on how she is doing at next visit.  I have reassured her this is a normal postop course and I anticipate she will ultimately do well.    Wound looks good.  May swim or immerse wound when all scabbing has disappeared.  Continue activity restrictions until 6 weeks post op.  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 Have written note to keep her off until  3/18/19.  Follow up NS 4 weeks from now with me, no imaging needed.  See Dr Arevalo in 10, no weeks no imaging.     It has been a pleasure looking after this very nice patient. We appreciate your confidence in the [facility], Department of Neurosurgery.    Deya White PA-C  [facility]  Department of Neurosurgery  Phone: [phone]  Fax: [phone]    This note was generated using voice recognition software. While edited for content some inaccurate phrasing may be found.

## 2019-02-19 NOTE — PROGRESS NOTES
"  NEUROSURGERY WOUND CHECK  February 19, 2019    ATTENDING:  GAIL  PROCEDURE:  2/5/19  DIAGNOSIS:  1) Lumbar radiculopathy  2) Chronic left lower back pain.   PROCEDURE:  Left minimally invasive lumbar 5-sacral 1 hemilaminectomy and microdiscectomy.  INDICATIONS:  Ms. Bagley is a 33-year-old female who presented to Neurosurgery Clinic with complaints of left-sided radicular pain that traveled down the lateral portion of her thigh, as well as leg below the knee and involved the little toe of her left foot.  She has attempted conservative measures and unfortunately had ongoing symptoms.  Given that the patient had a large disc protrusion at the L5-S1 level, it was thought that her symptoms were related to a left S1 radiculopathy.  She was offered the above-mentioned procedure.  HPI:  Santos Bagley is a pleasant 34 year old female now 2 weeks status post the above procedure.  The procedure itself was without incident.  She recovered well and was discharged home in good condition.  Since surgery she has had some ongoing leg pain which is different from preop in that the foot and toe pain is improved. We have increased her gabapentin and started a dose pack and this is helping so far. She presents for routine post op visit    EXAM:  /82 (BP Location: Left arm)   Pulse 74   Ht 1.556 m (5' 1.25\")   Wt 88.9 kg (196 lb)   SpO2 96%   BMI 36.73 kg/m    Well developed well nourished female found seated comfortably in exam chair.  No apparent distress. She is accompanied.  A&O X3.  Mood and affect WNL. Language and fund of knowledge intact.  Is able to sit and rise independently.   She has a nicely healing incision. No sutures to remove.    ASSESSMENT/PLAN  1. Chronic left-sided low back pain with left-sided sciatica    2. Post-operative state      Doing well now 2 weeks left minimally invasive lumbar 5-sacral 1 hemilaminectomy and microdiscectomy. Has some residual leg pain which is responding nicely to treatment.  I " anticipate this will resolve over time, plan to keep her on the gabapentin minimally 6 weeks, possibly 3 months.  Will depend on how she is doing at next visit.  I have reassured her this is a normal postop course and I anticipate she will ultimately do well.    Wound looks good.  May swim or immerse wound when all scabbing has disappeared.  Continue activity restrictions until 6 weeks post op.  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 Have written note to keep her off until  3/18/19.  Follow up NS 4 weeks from now with me, no imaging needed.  See Dr Arevalo in 10, no weeks no imaging.     It has been a pleasure looking after this very nice patient. We appreciate your confidence in the [facility], Department of Neurosurgery.    Deya White PA-C  [facility]  Department of Neurosurgery  Phone: [phone]  Fax: [phone]      This note was generated using voice recognition software. While edited for content some inaccurate phrasing may be found.

## 2019-02-19 NOTE — LETTER
Return to Work Release    Date: 2/19/2019      Name: Santos Bagley                       YOB: 1985    Medical Record Number: [id number]    The patient was seen at: [facility] NEUROS[facility]GERY    Resume Activity: NO WORK ALLOWED UNTIL AT LEAST 3/18/19        _________________________  Deya White PA-C  [facility]  Department of Neurosurgery  Phone: [phone]  Fax: [phone]

## 2019-03-01 ENCOUNTER — OFFICE VISIT (OUTPATIENT)
Dept: FAMILY MEDICINE | Facility: CLINIC | Age: 34
End: 2019-03-01
Payer: COMMERCIAL

## 2019-03-01 ENCOUNTER — TELEPHONE (OUTPATIENT)
Dept: FAMILY MEDICINE | Facility: CLINIC | Age: 34
End: 2019-03-01

## 2019-03-01 VITALS
HEART RATE: 101 BPM | HEIGHT: 61 IN | OXYGEN SATURATION: 98 % | WEIGHT: 198 LBS | DIASTOLIC BLOOD PRESSURE: 60 MMHG | BODY MASS INDEX: 37.38 KG/M2 | RESPIRATION RATE: 22 BRPM | TEMPERATURE: 98.4 F | SYSTOLIC BLOOD PRESSURE: 110 MMHG

## 2019-03-01 DIAGNOSIS — E11.9 TYPE 2 DIABETES MELLITUS WITHOUT COMPLICATION, WITHOUT LONG-TERM CURRENT USE OF INSULIN (H): ICD-10-CM

## 2019-03-01 DIAGNOSIS — E66.01 MORBID OBESITY (H): Primary | ICD-10-CM

## 2019-03-01 PROCEDURE — 99213 OFFICE O/P EST LOW 20 MIN: CPT | Performed by: NURSE PRACTITIONER

## 2019-03-01 ASSESSMENT — PAIN SCALES - GENERAL: PAINLEVEL: NO PAIN (0)

## 2019-03-01 ASSESSMENT — MIFFLIN-ST. JEOR: SCORE: 1534.46

## 2019-03-01 NOTE — TELEPHONE ENCOUNTER
Hello,    This drug Ozempic 0.25mg or 0.5mg requires prior authorization. Please contact insurance at [phone] .   Patient's ID [id number] .    Please contact pharmacy when prior authorization has been approved. We will contact patient when order has been filled.     Looks like the brand prefers TRULICITY which the patient tired but didn't succeed on.     RxBin: [id number]  RxGroup: [id number]  RxPCN: N/A    Thanks,   Zara Johnson, Pharmacy Tech  [facility]

## 2019-03-01 NOTE — PROGRESS NOTES
SUBJECTIVE:   Santos Bagley is a 34 year old female who presents to clinic today for the following health issues:      Chief Complaint   Patient presents with     Weight Check     INTERNAL MEDICINE  Medical Weight Loss - Return Visit      CHIEF COMPLAINT:  Recheck weight      Wt Readings from Last 5 Encounters:   03/01/19 89.8 kg (198 lb)   02/19/19 88.9 kg (196 lb)   02/05/19 91.6 kg (201 lb 15.1 oz)   01/22/19 91.8 kg (202 lb 6.4 oz)   01/04/19 91.9 kg (202 lb 8 oz)         HISTORY OF PRESENT ILLNESS (HPI):    Patient returns today for medical weight loss follow-up visit. Patient was last seen by me on 1/11/2019 and has lost 4 pounds and 1.3 % body fat.  Patient has lost 19 lbs total.    Patient recently underwent microdiskectomy.  She notes that pain has been severe following surgery.  She recently completed a course of steroids and her gabapentin dose was increased.  Patient has not been able to exercise.  She still feels like overall she is eating a healthy diet. Patient notes that trulicity causes stomach upset and her appetite is increased.  She would like to return to [facility] if possible.  Her blood sugars remain well controlled.        MEDICATIONS:   Current Outpatient Medications   Medication Sig Dispense Refill     Acetaminophen (TYLENOL PO) Take 1,000 mg by mouth as needed        blood glucose (NO BRAND SPECIFIED) lancets standard Use to test blood sugar one times daily or as directed. 100 each 11     blood glucose (NO BRAND SPECIFIED) lancing device Use to test blood sugars one times daily or as directed. 1 each 0     blood glucose monitoring (NO BRAND SPECIFIED) meter device kit Use to test blood sugar one times daily or as directed. 1 kit 0     blood glucose monitoring (NO BRAND SPECIFIED) test strip Use to test blood sugars one times daily or as directed 100 strip 3     calcium carbonate (TUMS) 500 MG chewable tablet Take 1 chew tab by mouth as needed for heartburn       gabapentin (NEURONTIN) 300 MG  "capsule Start with 1 capsule my mouth 3 times daily for 5 days, then increase to 1 capsule by mouth 3 times daily. 90 capsule 3     IBUPROFEN PO Take 800 mg by mouth as needed        insulin pen needle (31G X 6 MM) 31G X 6 MM miscellaneous Use once weekly or as directed. 30 each 1     methylPREDNISolone (MEDROL DOSEPAK) 4 MG tablet therapy pack Follow Package Directions 21 tablet 0     TRULICITY 0.75 MG/0.5ML pen every 7 days       gabapentin (NEURONTIN) 100 MG capsule Take 1 capsule (100 mg) by mouth 3 times daily (Patient not taking: Reported on 3/1/2019) 270 capsule 1     polyethylene glycol (MIRALAX/GLYCOLAX) packet Take 17 g by mouth 2 times daily (Patient not taking: Reported on 3/1/2019) 60 packet 0     polyethylene glycol (MIRALAX/GLYCOLAX) powder        senna-docusate (SENOKOT-S/PERICOLACE) 8.6-50 MG tablet Take 2 tablets by mouth 2 times daily (Patient not taking: Reported on 3/1/2019) 120 tablet 0       ALLERGIES:   Allergies   Allergen Reactions     Nkda [No Known Drug Allergies]        PHYSICAL EXAMINATION:    VITALS: /60   Pulse 101   Temp 98.4  F (36.9  C) (Oral)   Resp 22   Ht 1.556 m (5' 1.25\")   Wt 89.8 kg (198 lb)   SpO2 98%   BMI 37.11 kg/m    GENERAL: healthy, alert and no distress  RESP: lungs clear to auscultation - no rales, rhonchi or wheezes  CV: regular rate and rhythm, normal S1 S2, no S3 or S4, no murmur, click or rub, no peripheral edema and peripheral pulses strong  MS: no gross musculoskeletal defects noted, no edema  PSYCH: mentation appears normal, affect normal and bright.    LAB RESULTS:   Reviewed in Epic    ASSESSMENT/PLAN:    1. Morbid obesity (H)  Patient to exercise as able.  Will try to get semaglutide approved through prior auth due to side effects.  I suspect recent prednisone use and increased dose of gabapentin may be contributing to increased appetite.  Will try to wean gabapentin in the future as pain allows.  - Semaglutide 0.25 or 0.5 MG/DOSE SOPN; Inject " 0.25 mg Subcutaneous every 7 days  Dispense: 1.5 mL; Refill: 1    2. Type 2 diabetes mellitus without complication, without long-term current use of insulin (H)    - Semaglutide 0.25 or 0.5 MG/DOSE SOPN; Inject 0.25 mg Subcutaneous every 7 days  Dispense: 1.5 mL; Refill: 1         Patient is to call the clinic if she experiences any adverse reactions.     Follow up with Provider - 8 weeks         I spent 15 minutes of time with the patient and >50% of it was in education and counseling regarding weight loss.       CIPRIANO Perez [facility]

## 2019-03-04 ENCOUNTER — OFFICE VISIT (OUTPATIENT)
Dept: FAMILY MEDICINE | Facility: CLINIC | Age: 34
End: 2019-03-04
Payer: COMMERCIAL

## 2019-03-04 VITALS
HEART RATE: 96 BPM | TEMPERATURE: 98.2 F | BODY MASS INDEX: 37.22 KG/M2 | RESPIRATION RATE: 16 BRPM | SYSTOLIC BLOOD PRESSURE: 116 MMHG | DIASTOLIC BLOOD PRESSURE: 76 MMHG | WEIGHT: 198.6 LBS | OXYGEN SATURATION: 98 %

## 2019-03-04 DIAGNOSIS — H60.393 INFECTIVE OTITIS EXTERNA, BILATERAL: Primary | ICD-10-CM

## 2019-03-04 PROCEDURE — 99213 OFFICE O/P EST LOW 20 MIN: CPT | Performed by: NURSE PRACTITIONER

## 2019-03-04 RX ORDER — OFLOXACIN 3 MG/ML
5 SOLUTION AURICULAR (OTIC) DAILY
Qty: 5 ML | Refills: 0 | Status: SHIPPED | OUTPATIENT
Start: 2019-03-04 | End: 2019-04-12

## 2019-03-04 RX ORDER — DULAGLUTIDE 0.75 MG/.5ML
INJECTION, SOLUTION SUBCUTANEOUS
COMMUNITY
Start: 2019-02-13 | End: 2019-04-26

## 2019-03-04 NOTE — PROGRESS NOTES
SUBJECTIVE:  Santos Bagley  is a 34 year old  female  who presents with the following concerns;    Symptom duration:  2 days   Sympom severity:  mild-worse at night   Treatments tried:  tylenol and ibu   Contacts:  none                 Symptoms: Present Comment   Fever/Chills     Fatigue     Muscle Aches     Eye Irritation     Sneezing     Nasal Tj/Drg     Sinus Pressure/Pain     Loss of smell     Dental pain     Sore Throat     Swollen Glands     Ear Pain/Fullness x Right ear-plugged, noisy, drainage   Cough     Wheeze     Chest Pain     Shortness of breath     Rash     Other x headache     Medications updated and reviewed.  Past, family and surgical history is updated and reviewed in the record.  Patient Active Problem List    Diagnosis Date Noted     Type 2 diabetes mellitus without complication, without long-term current use of insulin (H) 11/05/2018     Priority: Medium     Morbid obesity (H) 09/18/2018     Priority: Medium     Impaired fasting glucose 03/23/2018     Priority: Medium     Cervical high risk HPV (human papillomavirus) test positive 03/22/2018     Priority: Medium     3/22/18 NIL Pap, + HR HPV (Neg 16/18). Plan cotest in 1 year.        Hyperlipidemia LDL goal <160 12/06/2011     Priority: Medium     Chronic tonsillitis 10/04/2010     Priority: Medium     Past Medical History:   Diagnosis Date     Cervical high risk HPV (human papillomavirus) test positive 03/22/2018    See Problem List     DM (diabetes mellitus) (H)      Lumbar radiculopathy      Severe Cervical Dysplasia 7/02    LEEP, Needs annual pap smear screening      Family History   Family history unknown: Yes     ROS:  Other than noted above, general, HEENT, respiratory, cardiac and gastrointestinal systems are negative.    OBJECTIVE:  GENERAL:  Alert, no acute distress  EYES:  PERRL, EOM normal, conjunctiva and lids normal  HEENT: TMs normal, oral mucosa and posterior oropharynx normal POSITIVE for bilateral ear canals erythematous,  edematous, R ear canal with purulent drainage.   RESP:  Lungs clear to auscultation.  CV:  Normal rate, regular rhythm, no murmur or gallop.  LYMPHATICS:  No cervical, supraclavicular adenopathy  SKIN:  No suspicious rashes.    Assessment/Plan:     ICD-10-CM    1. Infective otitis externa, bilateral H60.393 ofloxacin (FLOXIN) 0.3 % otic solution        See patient instructions:  Advised pt use full course of drops, lay on side, have someone else instill drops, wait 5 minutes, then turn and do opposite ear.     Diamond Keys, APRN, FNP-BC

## 2019-03-04 NOTE — PATIENT INSTRUCTIONS
Patient Education     External Ear Infection (Adult)    External otitis (also called  swimmer s ear ) is an infection in the ear canal. It is often caused by bacteria or fungus. It can occur a few days after water gets trapped in the ear canal (from swimming or bathing). It can also occur after cleaning too deeply in the ear canal with a cotton swab or other object. Sometimes, hair care products get into the ear canal and cause this problem.  Symptoms can include pain, fever, itching, redness, drainage, or swelling of the ear canal. Temporary hearing loss may also occur.  Home care    Do not try to clean the ear canal. This can push pus and bacteria deeper into the canal.    Use prescribed ear drops as directed. These help reduce swelling and fight the infection. If an ear wick was placed in the ear canal, apply drops right onto the end of the wick. The wick will draw the medicine into the ear canal even if it is swollen closed.    A cotton ball may be loosely placed in the outer ear to absorb any drainage.    You may use acetaminophen or ibuprofen to control pain, unless another medicine was prescribed. Note: If you have chronic liver or kidney disease or ever had a stomach ulcer or GI bleeding, talk to your healthcare provider before taking any of these medicines.    Do not allow water to get into your ear when bathing. Also, don't swim until the infection has cleared.  Prevention    Keep your ears dry. This helps lower the risk of infection. Dry your ears with a towel or hair dryer after getting wet. Also, use ear plugs when swimming.    Do not stick any objects in the ear to remove wax.    If you feel water trapped in your ear, use ear drops right away. You can get these drops over the counter at most drugstores. They work by removing water from the ear canal.  Follow-up care  Follow up with your healthcare provider in 1 week, or as advised.  When to seek medical advice  Call your healthcare provider right  away if any of these occur:    Ear pain becomes worse or doesn t improve after 3 days of treatment    Redness or swelling of the outer ear occurs or gets worse    Headache    Painful or stiff neck    Drowsiness or confusion    Fever of 100.4 F (38 C) or higher, or as directed by your healthcare provider    Seizure  Date Last Reviewed: 10/1/2017    [id number] [employer]. [address] [place] [address]. All rights reserved. This information is not intended as a substitute for professional medical care. Always follow your healthcare professional's instructions.

## 2019-03-05 DIAGNOSIS — G89.29 CHRONIC LEFT-SIDED LOW BACK PAIN WITH LEFT-SIDED SCIATICA: ICD-10-CM

## 2019-03-05 DIAGNOSIS — M54.42 CHRONIC LEFT-SIDED LOW BACK PAIN WITH LEFT-SIDED SCIATICA: ICD-10-CM

## 2019-03-05 DIAGNOSIS — Z98.890 POST-OPERATIVE STATE: Primary | ICD-10-CM

## 2019-03-05 RX ORDER — LIDOCAINE 50 MG/G
1 PATCH TOPICAL EVERY 24 HOURS
Qty: 30 PATCH | Refills: 0 | Status: SHIPPED | OUTPATIENT
Start: 2019-03-05 | End: 2019-06-24

## 2019-03-06 NOTE — TELEPHONE ENCOUNTER
PRIOR AUTHORIZATION DENIED    Medication: Ozempic 0.25mg or 0.5mg    Denial Date:  3/6/2019    Denial Rational:  Per insurance, medication is excluded from patients benefit plan and will not be covered. Review and appeal are not available because of this exclusion.      Appeal Information: N/A

## 2019-03-07 NOTE — TELEPHONE ENCOUNTER
Please notify patient.  She could try daily victoza injections to see if she tolerate better than trulicity.      Thanks,  Gina Maldonado, CNP

## 2019-03-07 NOTE — TELEPHONE ENCOUNTER
Noted in chart that it is ok to leave detailed message on patient's VM    Detailed message left on patient's VM with note as written below.  RN hotline number [phone] given for patient to call back to discuss whether or not she wants to try the Bradford Hu RN

## 2019-03-08 NOTE — TELEPHONE ENCOUNTER
Patient notified.  She would like to try Victoza    Please send to [facility] pharmacy    Catia Hu RN

## 2019-03-11 ENCOUNTER — MYC MEDICAL ADVICE (OUTPATIENT)
Dept: FAMILY MEDICINE | Facility: CLINIC | Age: 34
End: 2019-03-11

## 2019-03-11 DIAGNOSIS — H93.19 TINNITUS, UNSPECIFIED LATERALITY: Primary | ICD-10-CM

## 2019-03-15 ENCOUNTER — TELEPHONE (OUTPATIENT)
Dept: FAMILY MEDICINE | Facility: CLINIC | Age: 34
End: 2019-03-15

## 2019-03-15 DIAGNOSIS — E11.9 TYPE 2 DIABETES MELLITUS WITHOUT COMPLICATION, WITHOUT LONG-TERM CURRENT USE OF INSULIN (H): Primary | ICD-10-CM

## 2019-03-15 NOTE — TELEPHONE ENCOUNTER
Panel Management Review      Patient has the following on her problem list:     Diabetes    ASA: TBD by provider     Last A1C  Lab Results   Component Value Date    A1C 5.5 01/04/2019    A1C 6.5 09/25/2018     A1C tested: Passed    Last LDL:    Lab Results   Component Value Date    CHOL 253 09/25/2018     Lab Results   Component Value Date    HDL 33 09/25/2018     Lab Results   Component Value Date    LDL  09/25/2018     Cannot estimate LDL when triglyceride exceeds 400 mg/dL     09/25/2018     Lab Results   Component Value Date    TRIG 546 09/25/2018     Lab Results   Component Value Date    CHOLHDLRATIO 7.4 09/17/2013     Lab Results   Component Value Date    NHDL 220 09/25/2018       Is the patient on a Statin? NO             Is the patient on Aspirin? NO        Last three blood pressure readings:  BP Readings from Last 3 Encounters:   03/04/19 116/76   03/01/19 110/60   02/19/19 122/82       Date of last diabetes office visit: 3/1/19     Tobacco History:     History   Smoking Status     Former Smoker     Packs/day: 0.50     Years: 12.00     Types: Cigarettes   Smokeless Tobacco     Never Used           Composite cancer screening  Chart review shows that this patient is due/due soon for the following Pap Smear  Summary:    Patient is due/failing the following:   Physical and pap due after 3/22/19  Statin and ASA to be address by provider    Action needed:   Routed to provider for review.    Type of outreach:    None, routed to provider for review.    Questions for provider review:    Please advise on ASA and statin need for diabetic patient. If not needed please as to medication list as Intentionally not prescribed.   Then send to staff to address other fails. Thanks                                                                                                                                    Nany Reyes MA       Chart routed to Provider .

## 2019-03-18 ASSESSMENT — ENCOUNTER SYMPTOMS
HOARSE VOICE: 0
NUMBNESS: 0
INCREASED ENERGY: 0
TASTE DISTURBANCE: 0
DECREASED CONCENTRATION: 0
TREMORS: 0
FEVER: 0
WEAKNESS: 0
MEMORY LOSS: 1
NERVOUS/ANXIOUS: 0
DIZZINESS: 0
LOSS OF CONSCIOUSNESS: 0
POLYPHAGIA: 0
WEIGHT GAIN: 0
CHILLS: 0
DECREASED APPETITE: 0
TINGLING: 0
SINUS CONGESTION: 0
SPEECH CHANGE: 0
HEADACHES: 1
PANIC: 0
INSOMNIA: 1
SINUS PAIN: 0
SORE THROAT: 0
PARALYSIS: 0
HALLUCINATIONS: 0
DISTURBANCES IN COORDINATION: 0
ALTERED TEMPERATURE REGULATION: 0
SEIZURES: 0
WEIGHT LOSS: 0
TROUBLE SWALLOWING: 0
FATIGUE: 0
NECK MASS: 0
NIGHT SWEATS: 0
DEPRESSION: 0
SMELL DISTURBANCE: 0

## 2019-03-19 ENCOUNTER — OFFICE VISIT (OUTPATIENT)
Dept: NEUROSURGERY | Facility: CLINIC | Age: 34
End: 2019-03-19

## 2019-03-19 VITALS
HEART RATE: 72 BPM | OXYGEN SATURATION: 97 % | SYSTOLIC BLOOD PRESSURE: 116 MMHG | HEIGHT: 61 IN | BODY MASS INDEX: 37.57 KG/M2 | TEMPERATURE: 98.2 F | WEIGHT: 199 LBS | DIASTOLIC BLOOD PRESSURE: 86 MMHG | RESPIRATION RATE: 18 BRPM

## 2019-03-19 DIAGNOSIS — M54.16 LUMBAR RADICULOPATHY: ICD-10-CM

## 2019-03-19 DIAGNOSIS — Z98.890 POST-OPERATIVE STATE: ICD-10-CM

## 2019-03-19 DIAGNOSIS — G89.29 CHRONIC LEFT-SIDED LOW BACK PAIN WITH LEFT-SIDED SCIATICA: Primary | ICD-10-CM

## 2019-03-19 DIAGNOSIS — M53.3 PAIN OF LEFT SACROILIAC JOINT: ICD-10-CM

## 2019-03-19 DIAGNOSIS — M54.42 CHRONIC LEFT-SIDED LOW BACK PAIN WITH LEFT-SIDED SCIATICA: Primary | ICD-10-CM

## 2019-03-19 RX ORDER — OFLOXACIN 3 MG/ML
SOLUTION/ DROPS OPHTHALMIC
COMMUNITY
Start: 2019-03-04 | End: 2019-04-26

## 2019-03-19 ASSESSMENT — MIFFLIN-ST. JEOR: SCORE: 1539

## 2019-03-19 ASSESSMENT — PAIN SCALES - GENERAL: PAINLEVEL: MILD PAIN (3)

## 2019-03-19 NOTE — LETTER
RE: Santos Bagley  [address]  [facility] [address]     Dear Colleague,    Thank you for referring your patient, Santos Bagley, to the [facility] NEUROSURGERY at [facility]. Please see a copy of my visit note below.    NEUROSURGERY  March 19, 2019    ATTENDING:  GAIL  PROCEDURE:  2/5/19  DIAGNOSIS:  1) Lumbar radiculopathy  2) Chronic left lower back pain.   PROCEDURE:  Left minimally invasive lumbar 5-sacral 1 hemilaminectomy and microdiscectomy.  INDICATIONS:  Ms. Bagley is a 33-year-old female who presented to Neurosurgery Clinic with complaints of left-sided radicular pain that traveled down the lateral portion of her thigh, as well as leg below the knee and involved the little toe of her left foot.  She has attempted conservative measures and unfortunately had ongoing symptoms.  Given that the patient had a large disc protrusion at the L5-S1 level, it was thought that her symptoms were related to a left S1 radiculopathy.  She was offered the above-mentioned procedure.  HPI:  Santos Bagley is a pleasant 34 year old female now 6 weeks status post the above procedure.  The procedure itself was without incident.  She recovered well and was discharged home in good condition.  Since surgery she has had some ongoing leg pain which is different from preop in that the foot and toe pain is improved. We increased her gabapentin and gave her a dose pack, her leg pain resolved, but in the last couple of weeks she developed some left upper buttock pain, worse with walking, worse with frog-legging the left leg.  It hurts constantly, although since developing it seems to be improving somewhat all by itself. Not associated with numbness, tingling, or weakness.  No radiating pain.  No bowel or bladder dysfunction.  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  She presents for routine post op visit    EXAM:  /86   Pulse 72   Temp 98.2  F (36.8  C)  "(Oral)   Resp 18   Ht 1.556 m (5' 1.25\")   Wt 90.3 kg (199 lb)   SpO2 97%   BMI 37.29 kg/m     Well developed well nourished female found seated comfortably in exam chair.  No apparent distress. She is accompanied.  A&O X3.  Mood and affect WNL. Language and fund of knowledge intact.  Is able to sit and rise independently.   She has a healed incision.   5/5  Positive Balwinder's on the left which reproduces pain in left SI area. No pain in SI area with manual palpation.    ASSESSMENT/PLAN  1. Chronic left-sided low back pain with left-sided sciatica    2. Lumbar radiculopathy    3. Post-operative state    4. Pain of left sacroiliac joint      Doing well now 6 weeks sp left minimally invasive lumbar 5-sacral 1 hemilaminectomy and microdiscectomy.  Her leg pain has resolved, we can begin weaning the gabapentin and I described how to do that.  She knows that if her leg pain starts to come back she should stop the wean and go back up to treatment dose.  I do not anticipate that we will happen    She has some new left SI joint pain.  We discussed treatment options.  She would like to keep this simple so were going to try scheduled dose NSAIDs for 1 week and physical therapy.    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  I wrote an off work letter which keeps her off until 4/15/19.    See Dr Arevalo at 12 weeks postop, no imaging.    It has been a pleasure looking after this very nice patient. We appreciate your confidence in the [facility], Department of Neurosurgery.    Deya White PA-C  [facility]  Department of Neurosurgery  Phone: [phone]  Fax: [phone]    This note was generated using voice recognition software. While edited for content some inaccurate phrasing may be found.  "

## 2019-03-19 NOTE — PROGRESS NOTES
"  NEUROSURGERY  March 19, 2019    ATTENDING:  GAIL  PROCEDURE:  2/5/19  DIAGNOSIS:  1) Lumbar radiculopathy  2) Chronic left lower back pain.   PROCEDURE:  Left minimally invasive lumbar 5-sacral 1 hemilaminectomy and microdiscectomy.  INDICATIONS:  Ms. Bagley is a 33-year-old female who presented to Neurosurgery Clinic with complaints of left-sided radicular pain that traveled down the lateral portion of her thigh, as well as leg below the knee and involved the little toe of her left foot.  She has attempted conservative measures and unfortunately had ongoing symptoms.  Given that the patient had a large disc protrusion at the L5-S1 level, it was thought that her symptoms were related to a left S1 radiculopathy.  She was offered the above-mentioned procedure.  HPI:  Santos Bagley is a pleasant 34 year old female now 6 weeks status post the above procedure.  The procedure itself was without incident.  She recovered well and was discharged home in good condition.  Since surgery she has had some ongoing leg pain which is different from preop in that the foot and toe pain is improved. We increased her gabapentin and gave her a dose pack, her leg pain resolved, but in the last couple of weeks she developed some left upper buttock pain, worse with walking, worse with frog-legging the left leg.  It hurts constantly, although since developing it seems to be improving somewhat all by itself. Not associated with numbness, tingling, or weakness.  No radiating pain.  No bowel or bladder dysfunction.  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  She presents for routine post op visit    EXAM:  /86   Pulse 72   Temp 98.2  F (36.8  C) (Oral)   Resp 18   Ht 1.556 m (5' 1.25\")   Wt 90.3 kg (199 lb)   SpO2 97%   BMI 37.29 kg/m    Well developed well nourished female found seated comfortably in exam chair.  No apparent distress. She is accompanied.  A&O X3.  Mood and affect WNL. Language " and fund of knowledge intact.  Is able to sit and rise independently.   She has a healed incision.   5/5  Positive Balwinder's on the left which reproduces pain in left SI area. No pain in SI area with manual palpation.    ASSESSMENT/PLAN  1. Chronic left-sided low back pain with left-sided sciatica    2. Lumbar radiculopathy    3. Post-operative state    4. Pain of left sacroiliac joint      Doing well now 6 weeks sp left minimally invasive lumbar 5-sacral 1 hemilaminectomy and microdiscectomy.  Her leg pain has resolved, we can begin weaning the gabapentin and I described how to do that.  She knows that if her leg pain starts to come back she should stop the wean and go back up to treatment dose.  I do not anticipate that we will happen    She has some new left SI joint pain.  We discussed treatment options.  She would like to keep this simple so were going to try scheduled dose NSAIDs for 1 week and physical therapy.    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  I wrote an off work letter which keeps her off until 4/15/19.    See Dr Arevalo at 12 weeks postop, no imaging.    It has been a pleasure looking after this very nice patient. We appreciate your confidence in the [facility], Department of Neurosurgery.    Deya White PA-C  [facility]  Department of Neurosurgery  Phone: [phone]  Fax: [phone]      This note was generated using voice recognition software. While edited for content some inaccurate phrasing may be found.

## 2019-03-19 NOTE — NURSING NOTE
Chief Complaint   Patient presents with     Surgical Followup     [facility] RETURN 4 WK POST OP       Billy Lamb EMT

## 2019-03-19 NOTE — LETTER
Return to  Work Release    Date: 3/19/2019      Name: Santos Bagley                       YOB: 1985  Medical Record Number: [id number]    The patient was seen at: [facility] NEUROSURGERY       Resume Activity: UNABLE TO RETURN TO WORK AT THIS TIME. ANTICIPATE RETURN 4/15/19 OR SO.             ______________________  Deya White PA-C  [facility]  Department of Neurosurgery  Phone: [phone]  Fax: [phone]

## 2019-03-25 ENCOUNTER — THERAPY VISIT (OUTPATIENT)
Dept: PHYSICAL THERAPY | Facility: CLINIC | Age: 34
End: 2019-03-25
Payer: OTHER MISCELLANEOUS

## 2019-03-25 DIAGNOSIS — M54.42 CHRONIC LEFT-SIDED LOW BACK PAIN WITH LEFT-SIDED SCIATICA: ICD-10-CM

## 2019-03-25 DIAGNOSIS — Z98.890 HISTORY OF BACK SURGERY: ICD-10-CM

## 2019-03-25 DIAGNOSIS — G89.29 CHRONIC LEFT-SIDED LOW BACK PAIN WITH LEFT-SIDED SCIATICA: ICD-10-CM

## 2019-03-25 DIAGNOSIS — M53.3 PAIN OF LEFT SACROILIAC JOINT: ICD-10-CM

## 2019-03-25 PROCEDURE — 97110 THERAPEUTIC EXERCISES: CPT | Mod: GP | Performed by: PHYSICAL THERAPIST

## 2019-03-25 PROCEDURE — 97162 PT EVAL MOD COMPLEX 30 MIN: CPT | Mod: GP | Performed by: PHYSICAL THERAPIST

## 2019-03-25 NOTE — PROGRESS NOTES
"[facility] Initial Evaluation  Subjective:  The history is provided by the patient. No  was used.   Santos Bagley is a 34 year old female with a lumbar condition.  Condition occurred with:  A fall/slip.  Condition occurred: at work.  This is a chronic condition  Pt fell at work 12/20/2017.  See PT notes 03/29-06/25/2018 for add'l history.  Pt had two injections prior to surgery but ended up with L5/S1 hemilaminectomy and discectomy 02/05/2019.  Pt states recovery since surgery \"wasn't what I expected.\"  Was having back pain, leg pain, shooting pain \"ten times worse\" than prior to surgery.  Medication change about three weeks after surgery to include gabapentin and Prednisone that seemed to reduce the \"shooting pain in my leg.  Everything went away in my leg\" but the back pain remained.  She reports that the area of low back pain seems to be decreasing, although is shifting down and toward her L.    Patient reports pain:  Central lumbar spine, lumbar spine left and SI joint left.  Radiates to:  Gluteals left and thigh left.  Quality: \"weighing me down\" and is constant and reported as 3/10.   Pain is worse in the P.M..  Exacerbated by: walking, sitting. Relieved by: supine lying.  Since onset symptoms are gradually improving.  Special testing: see imaging in chart.  Previous treatment includes physical therapy and chiropractic (see notes in chart).    General health as reported by patient is poor.  Pertinent medical history includes:  Overweight, high blood pressure and diabetes.  Medical allergies: no.  Surgical history: back surgery as above.  Current medications:  Anti-inflammatory and pain medication.  Current occupation is patient care.  Patient is currently not working due to present treatment problem.      Barriers include:  None as reported by the patient.    Red flags:  None as reported by the patient.    Pt states her goals are \"to not fall apart.\"                "     Objective:  Standing Alignment:        Lumbar:  Lordosis decr (low back surgical incisional scar without tenderness to palpation)                           Lumbar/SI Evaluation  ROM:      Strength: TA MMT 1/5  Lumbar Myotomes:    T12-L3 (Hip Flex):  Left: 5    Right: 5  L2-4 (Quads):  Left:  5    Right:  5  L4 (Ankle DF):  Left:  5    Right:  5  L5 (Great Toe Ext): Left: 5    Right: 5   S1 (Toe Raise):  Left: 5    Right: 5      Lumbar Dermtomes:  normal                Neural Tension/Mobility:    Left side:  SLR; SLR w/DF and Slump positive.     Right side:   SLR w/DF; Slump or SLR  negative.   Lumbar Palpation:  normal        Lumbar Provocation:      Left negative with:  PROM hip    Right negative with:  PROM hip    SI joint/Sacrum:    Negative FADIR, thigh thrust, iliac compression and distraction B; positive Gerson L but not R.                                                       General     ROS    Assessment/Plan:    Patient is a 34 year old female with lumbar and sacral complaints.    Patient has the following significant findings with corresponding treatment plan.                Diagnosis 1:  Back, SI pain s/p surgery  Pain -  hot/cold therapy  Decreased ROM/flexibility - manual therapy and therapeutic exercise  Decreased strength - therapeutic exercise and therapeutic activities  Decreased function - therapeutic activities  Impaired posture - neuro re-education    Therapy Evaluation Codes:   1) History comprised of:   Personal factors that impact the plan of care:      Overall behavior pattern, Past/current experiences, Time since onset of symptoms and Work status.    Comorbidity factors that impact the plan of care are:      Overweight.     Medications impacting care: Anti-inflammatory and Pain.  2) Examination of Body Systems comprised of:   Body structures and functions that impact the plan of care:      Lumbar spine and Sacral illiac joint.   Activity limitations that impact the plan of care are:       Sitting and Walking.  3) Clinical presentation characteristics are:   Unstable/Unpredictable.  4) Decision-Making    Moderate complexity using standardized patient assessment instrument and/or measureable assessment of functional outcome.  Cumulative Therapy Evaluation is: Moderate complexity.    Previous and current functional limitations:  (See Goal Flow Sheet for this information)    Short term and Long term goals: (See Goal Flow Sheet for this information)     Communication ability:  Patient appears to be able to clearly communicate and understand verbal and written communication and follow directions correctly.  Treatment Explanation - The following has been discussed with the patient:   RX ordered/plan of care  Anticipated outcomes  Possible risks and side effects  This patient would benefit from PT intervention to resume normal activities.   Rehab potential is fair to good.    Frequency:  1 X week, once daily  Duration:  for 4 weeks  Discharge Plan:  Achieve all LTG.  Independent in home treatment program.  Reach maximal therapeutic benefit.    Please refer to the daily flowsheet for treatment today, total treatment time and time spent performing 1:1 timed codes.

## 2019-04-01 ENCOUNTER — THERAPY VISIT (OUTPATIENT)
Dept: PHYSICAL THERAPY | Facility: CLINIC | Age: 34
End: 2019-04-01
Payer: OTHER MISCELLANEOUS

## 2019-04-01 DIAGNOSIS — Z98.890 HISTORY OF BACK SURGERY: ICD-10-CM

## 2019-04-01 DIAGNOSIS — M54.42 CHRONIC LEFT-SIDED LOW BACK PAIN WITH LEFT-SIDED SCIATICA: ICD-10-CM

## 2019-04-01 DIAGNOSIS — G89.29 CHRONIC LEFT-SIDED LOW BACK PAIN WITH LEFT-SIDED SCIATICA: ICD-10-CM

## 2019-04-01 PROCEDURE — 97110 THERAPEUTIC EXERCISES: CPT | Mod: GP | Performed by: PHYSICAL THERAPIST

## 2019-04-01 PROCEDURE — 97530 THERAPEUTIC ACTIVITIES: CPT | Mod: GP | Performed by: PHYSICAL THERAPIST

## 2019-04-01 PROCEDURE — 97112 NEUROMUSCULAR REEDUCATION: CPT | Mod: GP | Performed by: PHYSICAL THERAPIST

## 2019-04-12 ENCOUNTER — OFFICE VISIT (OUTPATIENT)
Dept: AUDIOLOGY | Facility: CLINIC | Age: 34
End: 2019-04-12
Payer: COMMERCIAL

## 2019-04-12 ENCOUNTER — OFFICE VISIT (OUTPATIENT)
Dept: OTOLARYNGOLOGY | Facility: CLINIC | Age: 34
End: 2019-04-12
Payer: COMMERCIAL

## 2019-04-12 VITALS
DIASTOLIC BLOOD PRESSURE: 80 MMHG | HEIGHT: 61 IN | SYSTOLIC BLOOD PRESSURE: 120 MMHG | WEIGHT: 199 LBS | BODY MASS INDEX: 37.57 KG/M2

## 2019-04-12 DIAGNOSIS — H93.11 TINNITUS OF RIGHT EAR: Primary | ICD-10-CM

## 2019-04-12 DIAGNOSIS — H93.11 TINNITUS, RIGHT: Primary | ICD-10-CM

## 2019-04-12 PROCEDURE — 99207 ZZC NO CHARGE LOS: CPT | Performed by: AUDIOLOGIST

## 2019-04-12 PROCEDURE — 92567 TYMPANOMETRY: CPT | Performed by: AUDIOLOGIST

## 2019-04-12 PROCEDURE — 99243 OFF/OP CNSLTJ NEW/EST LOW 30: CPT | Performed by: OTOLARYNGOLOGY

## 2019-04-12 PROCEDURE — 92557 COMPREHENSIVE HEARING TEST: CPT | Performed by: AUDIOLOGIST

## 2019-04-12 ASSESSMENT — MIFFLIN-ST. JEOR: SCORE: 1539

## 2019-04-12 NOTE — PATIENT INSTRUCTIONS
General Scheduling Information  To schedule your CT/MRI scan, please contact [place] [facility] at [phone]   [address] [place] [address]    To schedule your Surgery, please contact our Specialty Schedulers at [phone]    ENT Clinic Locations Clinic Hours Telephone Number     [facility]  [address] [place] [address]   Tuesday:       8:00am -- 4:00pm    Wednesday:  8:00am - 4:00pm   To schedule an appointment with   Dr. Rhodes,   please contact our   Specialty Scheduling Department at:     [phone]       [facility]  [address] [place] [address]   Friday:          8:00am - 4:00pm         Urgent Care Locations Clinic Hours Telephone Numbers     [facility]  [address] [place] [address]     Monday-Friday:     11:00pm - 9:00pm    Saturday-Sunday:  9:00am - 5:00pm   [phone]     [facility]  [address] [place] [address]     Monday-Friday:      5:00pm - 9:00pm     Saturday-Sunday:  9:00am - 5:00pm   [phone]

## 2019-04-12 NOTE — PROGRESS NOTES
AUDIOLOGY REPORT:    Patient was referred from ENT by Dr. Rhodes for audiology evaluation. She reports tinnitus in the right ear for the last month and a half. She reported that she had ear infections (otitis externa) in both ears starting at that same time and she was treated with ear drops. She reports occasional pressure and drainage. She denies ear pain and changes in hearing bilaterally and denies history of noise exposure.    Testing:    Otoscopy:   Otoscopic exam indicates ears are clear of cerumen bilaterally     Tympanograms:    RIGHT: normal eardrum mobility     LEFT:   restricted eardrum mobility (type As)    Thresholds:   Pure Tone Thresholds assessed using conventional audiometry with good reliability from 250-8000 Hz bilaterally using circumaural headphones     RIGHT:  normal hearing sensitivity at all tested frequencies    LEFT:    normal hearing sensitivity at all tested frequencies    Speech Reception Threshold:    RIGHT: 10 dB HL    LEFT:   0 dB HL  Results are in agreement with pure tone average.     Word Recognition Score:     RIGHT: 100% at 50 dB HL using NU-6 recorded word list.    LEFT:   100% at 50 dB HL using NU-6 recorded word list.    Discussed results with the patient.     Patient was returned to ENT for follow up.     Aurora Elliott, CCC-A  Licensed Audiologist #[id number]  4/12/2019

## 2019-04-12 NOTE — PROGRESS NOTES
I am seeing this patient in consultation for tinnitus at the request of the provider Diamond Keys.    Chief Complaint - Tinnitus    History of Present Illness - Santos Bagley is a 34 year old female who presents to me today with ringing in the right ear. It is constant, but can come and go.  It has been present and noticeable for approximately 1 month. It can come on with a pressure/headache, but not a severe. She tried ear drops. She wonders about a little right drainage. Ear itches some.  Hearing is good. There is no history of chronic ear disease or ear surgery. With regards to recreational, , and work-related noise exposure she has none. No family history of hearing loss at a young age. + regular use of aspirin or NSAIDS. She did stop some of this, but takes ibuprofen once daily. She was taking ibupforen 800 mg TID for a year. Occasional use of lidocaine.  Interestingly she says if she plugs her right ear the noise goes away.    Past Medical History -   Patient Active Problem List   Diagnosis     Chronic tonsillitis     Hyperlipidemia LDL goal <160     Impaired fasting glucose     Cervical high risk HPV (human papillomavirus) test positive     Morbid obesity (H)     Type 2 diabetes mellitus without complication, without long-term current use of insulin (H)     Early onset of delivery with baby delivered     Encounter for supervision of other normal pregnancy, unspecified trimester      labor     Chronic left-sided low back pain with left-sided sciatica     History of back surgery       Current Medications -   Current Outpatient Medications:      ACE/ARB/ARNI NOT PRESCRIBED (INTENTIONAL), Please choose reason not prescribed, below (Patient not taking: Reported on 3/19/2019), Disp: , Rfl:      Acetaminophen (TYLENOL PO), Take 1,000 mg by mouth as needed , Disp: , Rfl:      ASPIRIN NOT PRESCRIBED (INTENTIONAL), Please choose reason not prescribed, below (Patient not taking: Reported on 3/19/2019),  Disp: , Rfl:      blood glucose (NO BRAND SPECIFIED) lancets standard, Use to test blood sugar one times daily or as directed. (Patient not taking: Reported on 3/19/2019), Disp: 100 each, Rfl: 11     blood glucose (NO BRAND SPECIFIED) lancing device, Use to test blood sugars one times daily or as directed. (Patient not taking: Reported on 3/19/2019), Disp: 1 each, Rfl: 0     blood glucose monitoring (NO BRAND SPECIFIED) meter device kit, Use to test blood sugar one times daily or as directed. (Patient not taking: Reported on 3/19/2019), Disp: 1 kit, Rfl: 0     blood glucose monitoring (NO BRAND SPECIFIED) test strip, Use to test blood sugars one times daily or as directed (Patient not taking: Reported on 3/19/2019), Disp: 100 strip, Rfl: 3     gabapentin (NEURONTIN) 300 MG capsule, Start with 1 capsule my mouth 3 times daily for 5 days, then increase to 1 capsule by mouth 3 times daily., Disp: 90 capsule, Rfl: 3     insulin pen needle (31G X 5 MM) 31G X 5 MM miscellaneous, Use one pen needles daily or as directed. (Patient not taking: Reported on 3/19/2019), Disp: 100 each, Rfl: 3     insulin pen needle (31G X 6 MM) 31G X 6 MM miscellaneous, Use once weekly or as directed. (Patient not taking: Reported on 3/19/2019), Disp: 30 each, Rfl: 1     lidocaine (LIDODERM) 5 % patch, Place 1 patch onto the skin every 24 hours, Disp: 30 patch, Rfl: 0     liraglutide - Weight Management (SAXENDA) 18 MG/3ML pen, Inject 0.6 mg Subcutaneous daily for 7 days, THEN 1.2 mg daily for 7 days, THEN 1.8 mg daily. (Patient not taking: No sig reported), Disp: 3 pen, Rfl: 2     ofloxacin (OCUFLOX) 0.3 % ophthalmic solution, PT USES THESE IN EARS NOT EYES/ PT, Disp: , Rfl:      STATIN NOT PRESCRIBED (INTENTIONAL), 1 each daily Please choose reason not prescribed, below (Patient not taking: Reported on 3/19/2019), Disp: , Rfl:      TRULICITY 0.75 MG/0.5ML pen, , Disp: , Rfl:     Allergies - No Known Allergies    Social History -   Social  "History     Socioeconomic History     Marital status:      Spouse name: Not on file     Number of children: Not on file     Years of education: Not on file     Highest education level: Not on file   Occupational History     Not on file   Social Needs     Financial resource strain: Not on file     Food insecurity:     Worry: Not on file     Inability: Not on file     Transportation needs:     Medical: Not on file     Non-medical: Not on file   Tobacco Use     Smoking status: Former Smoker     Packs/day: 0.50     Years: 12.00     Pack years: 6.00     Types: Cigarettes     Smokeless tobacco: Never Used   Substance and Sexual Activity     Alcohol use: No     Drug use: No     Sexual activity: Yes     Partners: Male   Lifestyle     Physical activity:     Days per week: Not on file     Minutes per session: Not on file     Stress: Not on file   Relationships     Social connections:     Talks on phone: Not on file     Gets together: Not on file     Attends [employer] service: Not on file     Active member of club or organization: Not on file     Attends meetings of clubs or organizations: Not on file     Relationship status: Not on file     Intimate partner violence:     Fear of current or ex partner: Not on file     Emotionally abused: Not on file     Physically abused: Not on file     Forced sexual activity: Not on file   Other Topics Concern     Parent/sibling w/ CABG, MI or angioplasty before 65F 55M? No   Social History Narrative     Not on file       Family History - see HPI    Review of Systems - As per HPI and PMHx, otherwise 7 system review of the head and neck negative.    Physical Exam  /80   Ht 1.556 m (5' 1.25\")   Wt 90.3 kg (199 lb)   BMI 37.29 kg/m    General - The patient is in no distress. Alert and oriented to person and place, answers questions and cooperates with examination appropriately.   Neurologic - CN II-XII are grossly intact. No focal neurologic deficits.   Voice and Breathing - The " patient was breathing comfortably without the use of accessory muscles. There was no wheezing, stridor, or stertor.  The patients voice was clear and strong.  Ears - The tympanic membranes are normal in appearance, bony landmarks are intact.  No retraction, perforation, or masses. No fluid or purulence was seen in the external canal or the middle ear. No evidence of infection of the middle ear or external canal, cerumen was normal in appearance.  Eyes - Extraocular movements intact, and the pupils were reactive to light.  Sclera were not icteric or injected, conjunctiva were pink and moist.  Mouth - Examination of the oral cavity showed pink, healthy oral mucosa. No lesions or ulcerations noted.  The tongue was mobile and midline.  Throat - The walls of the oropharynx were smooth, symmetric, and had no lesions or ulcerations.  The uvula was midline on elevation.    Neck - Palpation of the occipital, submental, submandibular, internal jugular chain, and supraclavicular nodes did not demonstrate any abnormal lymph nodes or masses. No parotid masses. Palpation of the thyroid was soft and smooth, with no nodules or goiter appreciated.  The trachea was mobile and midline.      Audiologic Studies - An audiogram and tympanogram were performed today as part of the evaluation and personally reviewed. The tympanogram shows a normal Type A curve, with normal canal volume and middle ear pressure.  There is no sign of eustachian tube dysfunction or middle ear effusion.  The audiogram showed normal hearing.      Assessment and Plan - Santos Bagley is a 34 year old female who presents to me today with subjective tinnitus, but no hearing loss. She states that when she plugs her right ear it goes away.  She also only hears it in her home.  This certainly could be something with her home her bedroom like an electronic and I encouraged her to try to investigate that.  She does here to outside the home or in other rooms or something it  certainly could just be right tinnitus in the ear/brain.  That is the case its possibly due to medications and she should take less ibuprofen and lidocaine patches as these can cause tinnitus.  We also discussed masking.  Return if she suffers from worsening symptoms or hearing loss.      Norman Rhodes MD  Otolaryngology  [facility]

## 2019-04-12 NOTE — LETTER
2019         RE: Santos Bagley  [address]  [place] [id number]        Dear Colleague,    Thank you for referring your patient, Santos Bagley, to the [facility]. Please see a copy of my visit note below.    I am seeing this patient in consultation for tinnitus at the request of the provider Diamond Keys.    Chief Complaint - Tinnitus    History of Present Illness - Santos Bagley is a 34 year old female who presents to me today with ringing in the right ear. It is constant, but can come and go.  It has been present and noticeable for approximately 1 month. It can come on with a pressure/headache, but not a severe. She tried ear drops. She wonders about a little right drainage. Ear itches some.  Hearing is good. There is no history of chronic ear disease or ear surgery. With regards to recreational, , and work-related noise exposure she has none. No family history of hearing loss at a young age. + regular use of aspirin or NSAIDS. She did stop some of this, but takes ibuprofen once daily. She was taking ibupforen 800 mg TID for a year. Occasional use of lidocaine.  Interestingly she says if she plugs her right ear the noise goes away.    Past Medical History -   Patient Active Problem List   Diagnosis     Chronic tonsillitis     Hyperlipidemia LDL goal <160     Impaired fasting glucose     Cervical high risk HPV (human papillomavirus) test positive     Morbid obesity (H)     Type 2 diabetes mellitus without complication, without long-term current use of insulin (H)     Early onset of delivery with baby delivered     Encounter for supervision of other normal pregnancy, unspecified trimester      labor     Chronic left-sided low back pain with left-sided sciatica     History of back surgery       Current Medications -   Current Outpatient Medications:      ACE/ARB/ARNI NOT PRESCRIBED (INTENTIONAL), Please choose reason not prescribed, below (Patient not taking: Reported on  3/19/2019), Disp: , Rfl:      Acetaminophen (TYLENOL PO), Take 1,000 mg by mouth as needed , Disp: , Rfl:      ASPIRIN NOT PRESCRIBED (INTENTIONAL), Please choose reason not prescribed, below (Patient not taking: Reported on 3/19/2019), Disp: , Rfl:      blood glucose (NO BRAND SPECIFIED) lancets standard, Use to test blood sugar one times daily or as directed. (Patient not taking: Reported on 3/19/2019), Disp: 100 each, Rfl: 11     blood glucose (NO BRAND SPECIFIED) lancing device, Use to test blood sugars one times daily or as directed. (Patient not taking: Reported on 3/19/2019), Disp: 1 each, Rfl: 0     blood glucose monitoring (NO BRAND SPECIFIED) meter device kit, Use to test blood sugar one times daily or as directed. (Patient not taking: Reported on 3/19/2019), Disp: 1 kit, Rfl: 0     blood glucose monitoring (NO BRAND SPECIFIED) test strip, Use to test blood sugars one times daily or as directed (Patient not taking: Reported on 3/19/2019), Disp: 100 strip, Rfl: 3     gabapentin (NEURONTIN) 300 MG capsule, Start with 1 capsule my mouth 3 times daily for 5 days, then increase to 1 capsule by mouth 3 times daily., Disp: 90 capsule, Rfl: 3     insulin pen needle (31G X 5 MM) 31G X 5 MM miscellaneous, Use one pen needles daily or as directed. (Patient not taking: Reported on 3/19/2019), Disp: 100 each, Rfl: 3     insulin pen needle (31G X 6 MM) 31G X 6 MM miscellaneous, Use once weekly or as directed. (Patient not taking: Reported on 3/19/2019), Disp: 30 each, Rfl: 1     lidocaine (LIDODERM) 5 % patch, Place 1 patch onto the skin every 24 hours, Disp: 30 patch, Rfl: 0     liraglutide - Weight Management (SAXENDA) 18 MG/3ML pen, Inject 0.6 mg Subcutaneous daily for 7 days, THEN 1.2 mg daily for 7 days, THEN 1.8 mg daily. (Patient not taking: No sig reported), Disp: 3 pen, Rfl: 2     ofloxacin (OCUFLOX) 0.3 % ophthalmic solution, PT USES THESE IN EARS NOT EYES/ PT, Disp: , Rfl:      STATIN NOT PRESCRIBED  "(INTENTIONAL), 1 each daily Please choose reason not prescribed, below (Patient not taking: Reported on 3/19/2019), Disp: , Rfl:      TRULICITY 0.75 MG/0.5ML pen, , Disp: , Rfl:     Allergies - No Known Allergies    Social History -   Social History     Socioeconomic History     Marital status:      Spouse name: Not on file     Number of children: Not on file     Years of education: Not on file     Highest education level: Not on file   Occupational History     Not on file   Social Needs     Financial resource strain: Not on file     Food insecurity:     Worry: Not on file     Inability: Not on file     Transportation needs:     Medical: Not on file     Non-medical: Not on file   Tobacco Use     Smoking status: Former Smoker     Packs/day: 0.50     Years: 12.00     Pack years: 6.00     Types: Cigarettes     Smokeless tobacco: Never Used   Substance and Sexual Activity     Alcohol use: No     Drug use: No     Sexual activity: Yes     Partners: Male   Lifestyle     Physical activity:     Days per week: Not on file     Minutes per session: Not on file     Stress: Not on file   Relationships     Social connections:     Talks on phone: Not on file     Gets together: Not on file     Attends [employer] service: Not on file     Active member of club or organization: Not on file     Attends meetings of clubs or organizations: Not on file     Relationship status: Not on file     Intimate partner violence:     Fear of current or ex partner: Not on file     Emotionally abused: Not on file     Physically abused: Not on file     Forced sexual activity: Not on file   Other Topics Concern     Parent/sibling w/ CABG, MI or angioplasty before 65F 55M? No   Social History Narrative     Not on file       Family History - see HPI    Review of Systems - As per HPI and PMHx, otherwise 7 system review of the head and neck negative.    Physical Exam  /80   Ht 1.556 m (5' 1.25\")   Wt 90.3 kg (199 lb)   BMI 37.29 kg/m   "   General - The patient is in no distress. Alert and oriented to person and place, answers questions and cooperates with examination appropriately.   Neurologic - CN II-XII are grossly intact. No focal neurologic deficits.   Voice and Breathing - The patient was breathing comfortably without the use of accessory muscles. There was no wheezing, stridor, or stertor.  The patients voice was clear and strong.  Ears - The tympanic membranes are normal in appearance, bony landmarks are intact.  No retraction, perforation, or masses. No fluid or purulence was seen in the external canal or the middle ear. No evidence of infection of the middle ear or external canal, cerumen was normal in appearance.  Eyes - Extraocular movements intact, and the pupils were reactive to light.  Sclera were not icteric or injected, conjunctiva were pink and moist.  Mouth - Examination of the oral cavity showed pink, healthy oral mucosa. No lesions or ulcerations noted.  The tongue was mobile and midline.  Throat - The walls of the oropharynx were smooth, symmetric, and had no lesions or ulcerations.  The uvula was midline on elevation.    Neck - Palpation of the occipital, submental, submandibular, internal jugular chain, and supraclavicular nodes did not demonstrate any abnormal lymph nodes or masses. No parotid masses. Palpation of the thyroid was soft and smooth, with no nodules or goiter appreciated.  The trachea was mobile and midline.      Audiologic Studies - An audiogram and tympanogram were performed today as part of the evaluation and personally reviewed. The tympanogram shows a normal Type A curve, with normal canal volume and middle ear pressure.  There is no sign of eustachian tube dysfunction or middle ear effusion.  The audiogram showed normal hearing.      Assessment and Plan - Santos Bagley is a 34 year old female who presents to me today with subjective tinnitus, but no hearing loss. She states that when she plugs her right ear  it goes away.  She also only hears it in her home.  This certainly could be something with her home her bedroom like an electronic and I encouraged her to try to investigate that.  She does here to outside the home or in other rooms or something it certainly could just be right tinnitus in the ear/brain.  That is the case its possibly due to medications and she should take less ibuprofen and lidocaine patches as these can cause tinnitus.  We also discussed masking.  Return if she suffers from worsening symptoms or hearing loss.      Norman Rhodes MD  Otolaryngology  [facility]        Again, thank you for allowing me to participate in the care of your patient.        Sincerely,        Norman Rhodes MD

## 2019-04-15 ENCOUNTER — THERAPY VISIT (OUTPATIENT)
Dept: PHYSICAL THERAPY | Facility: CLINIC | Age: 34
End: 2019-04-15
Payer: OTHER MISCELLANEOUS

## 2019-04-15 DIAGNOSIS — G89.29 CHRONIC LEFT-SIDED LOW BACK PAIN WITH LEFT-SIDED SCIATICA: ICD-10-CM

## 2019-04-15 DIAGNOSIS — Z98.890 HISTORY OF BACK SURGERY: ICD-10-CM

## 2019-04-15 DIAGNOSIS — M54.42 CHRONIC LEFT-SIDED LOW BACK PAIN WITH LEFT-SIDED SCIATICA: ICD-10-CM

## 2019-04-15 PROCEDURE — 97110 THERAPEUTIC EXERCISES: CPT | Mod: GP | Performed by: PHYSICAL THERAPIST

## 2019-04-15 PROCEDURE — 97530 THERAPEUTIC ACTIVITIES: CPT | Mod: GP | Performed by: PHYSICAL THERAPIST

## 2019-04-15 NOTE — PROGRESS NOTES
"Subjective:  HPI  Oswestry Score: 10 %                 Objective:  System    Physical Exam    General     ROS    Assessment/Plan:    SUBJECTIVE  Subjective: Pt reports she is doing \"better.  The rock in my back is completely gone.\"  Can still have an occasional \"pinch\" into gluteal area but doesn't last very long.  Easier going from sitting to standing, sitting itself.  Taking no more ibuprofen.  Returning to work tomorrow and is most concerned about prolonged time on feet; will be able to transition back gradually, even if there is no formal restriction in place.   Current Pain level: (not rated numerically)   Changes in function:  Yes (See Goal flowsheet attached for changes in current functional level)     Adverse reaction to treatment or activity:  None    OBJECTIVE  Objective: No discomfort standing lumbar flexion.  \"A little sore\" with extension but \"not painful.\"  Positive SLR and slump.  TA MMT 2/5.     ASSESSMENT  Zoua continues to require intervention to meet STG and LTG's: PT  Patient is progressing as expected.  Response to therapy has shown an improvement in  function  Progress made towards STG/LTG?  Yes (See Goal flowsheet attached for updates on achievement of STG and LTG)    PLAN  Current treatment program is being advanced to more complex exercises.  Pt progressing well.  Plan f/u in about a week, which will be after she returns to work.  Consider incorporate more total body activity and potential discharge if no setbacks?    PTA/ATC plan:      Please refer to the daily flowsheet for treatment today, total treatment time and time spent performing 1:1 timed codes.        "

## 2019-04-22 ENCOUNTER — THERAPY VISIT (OUTPATIENT)
Dept: PHYSICAL THERAPY | Facility: CLINIC | Age: 34
End: 2019-04-22
Payer: OTHER MISCELLANEOUS

## 2019-04-22 DIAGNOSIS — G89.29 CHRONIC LEFT-SIDED LOW BACK PAIN WITH LEFT-SIDED SCIATICA: ICD-10-CM

## 2019-04-22 DIAGNOSIS — M54.42 CHRONIC LEFT-SIDED LOW BACK PAIN WITH LEFT-SIDED SCIATICA: ICD-10-CM

## 2019-04-22 DIAGNOSIS — Z98.890 HISTORY OF BACK SURGERY: ICD-10-CM

## 2019-04-22 PROCEDURE — 97110 THERAPEUTIC EXERCISES: CPT | Mod: GP | Performed by: PHYSICAL THERAPIST

## 2019-04-22 PROCEDURE — 97530 THERAPEUTIC ACTIVITIES: CPT | Mod: GP | Performed by: PHYSICAL THERAPIST

## 2019-04-22 NOTE — PROGRESS NOTES
"Subjective:  HPI                    Objective:  System    Physical Exam    General     ROS    Assessment/Plan:    PROGRESS  REPORT    Progress reporting period is from 03/25/2019 to 04/22/2019.       SUBJECTIVE  Subjective: Pt reports that she returned to work.  First day was very rough; spent only about nine hours doing some different duties and had more pain than expected.  Did HEP afterward and found was helpful.  Pain subsided by next day but was sore after four hour shift after that.  Has worked one more time since then and \"was prepared,\" but was still somewhat sore afterward.  Was like \"a truck ran over me.\"  Today feeling \"sore\" but not as bad as last week.  Is back to ibuprofen for her back as she has determined that wasn't ibuprofen that was tied to ringing in ears (found relationship with another medication).    Current Pain level: (2.5-3/10, not in leg but only in low back).     Initial Pain level: 3/10.   Changes in function:  Yes (See Goal flowsheet attached for changes in current functional level)  Adverse reaction to treatment or activity: see above with RTW    OBJECTIVE  Objective: Standing lumbar flexion \"stretch\" in low back B, pain with extension.     ASSESSMENT/PLAN  Updated problem list and treatment plan: Diagnosis 1:  S/p back surgery -- see plan below  STG/LTGs have been met or progress has been made towards goals:  Yes (See Goal flow sheet completed today.)  Assessment of Progress: The patient's condition has potential to improve.  Self Management Plans:  Patient has been instructed in a home treatment program.  I have re-evaluated this patient and find that the nature, scope, duration and intensity of the therapy is appropriate for the medical condition of the patient.  Santos continues to require the following intervention to meet STG and LTG's:  PT    Recommendations:  Pleased that leg symptoms have abated.  Has noted some increased pain with change in physical demands upon return to work. "  Has f/u with surgical team next week.  One visit prior to that and anticipate one after to assess response to further return to work and recommendations beyond.    Please refer to the daily flowsheet for treatment today, total treatment time and time spent performing 1:1 timed codes.

## 2019-04-26 ENCOUNTER — OFFICE VISIT (OUTPATIENT)
Dept: FAMILY MEDICINE | Facility: CLINIC | Age: 34
End: 2019-04-26
Payer: COMMERCIAL

## 2019-04-26 VITALS
HEIGHT: 61 IN | TEMPERATURE: 99.6 F | RESPIRATION RATE: 20 BRPM | BODY MASS INDEX: 37.38 KG/M2 | SYSTOLIC BLOOD PRESSURE: 112 MMHG | OXYGEN SATURATION: 97 % | WEIGHT: 198 LBS | HEART RATE: 101 BPM | DIASTOLIC BLOOD PRESSURE: 64 MMHG

## 2019-04-26 DIAGNOSIS — M51.27 HERNIATION OF INTERVERTEBRAL DISC BETWEEN L5 AND S1: Primary | ICD-10-CM

## 2019-04-26 DIAGNOSIS — E66.01 MORBID OBESITY (H): ICD-10-CM

## 2019-04-26 PROCEDURE — 99213 OFFICE O/P EST LOW 20 MIN: CPT | Performed by: NURSE PRACTITIONER

## 2019-04-26 RX ORDER — NAPROXEN 500 MG/1
500 TABLET ORAL 2 TIMES DAILY WITH MEALS
Qty: 60 TABLET | Refills: 1 | Status: SHIPPED | OUTPATIENT
Start: 2019-04-26 | End: 2019-06-24

## 2019-04-26 ASSESSMENT — MIFFLIN-ST. JEOR: SCORE: 1534.46

## 2019-04-26 ASSESSMENT — PAIN SCALES - GENERAL: PAINLEVEL: NO PAIN (0)

## 2019-04-26 NOTE — PROGRESS NOTES
SUBJECTIVE:   Santos Bagley is a 34 year old female who presents to clinic today for the following   health issues:        Chief Complaint   Patient presents with     Weight Check     Health Maintenance     eye , phq2, Preventive care, PAP, HPV     INTERNAL MEDICINE  Medical Weight Loss - Return Visit      CHIEF COMPLAINT:  Recheck weight      Wt Readings from Last 5 Encounters:   04/26/19 89.8 kg (198 lb)   04/12/19 90.3 kg (199 lb)   03/19/19 90.3 kg (199 lb)   03/04/19 90.1 kg (198 lb 9.6 oz)   03/01/19 89.8 kg (198 lb)         HISTORY OF PRESENT ILLNESS (HPI):    Patient returns today for medical weight loss follow-up visit. Patient was last seen by me on 3/15/2019 and has lost 0 pounds and 0 % body fat.      Patient notes that her back pain has been severe since her surgery.  She continues to work with physical therapy, but has not been able to resume exercising.    Patient developed tinnitus and stopped taking all of her meds.  She notes it stopped with stopping ibuprofen and saxenda.  She did retry ibuprofen and then tinnitus resumed.  She notes she has been maintaining a healthy diet, but notes the urge to overeat has returned since stopping saxenda.  She also wonders what she can do for back pain.    MEDICATIONS:   Current Outpatient Medications   Medication Sig Dispense Refill     ACE/ARB/ARNI NOT PRESCRIBED (INTENTIONAL) Please choose reason not prescribed, below       Acetaminophen (TYLENOL PO) Take 1,000 mg by mouth as needed        ASPIRIN NOT PRESCRIBED (INTENTIONAL) Please choose reason not prescribed, below (Patient not taking: Reported on 3/19/2019)       blood glucose (NO BRAND SPECIFIED) lancets standard Use to test blood sugar one times daily or as directed. (Patient not taking: Reported on 3/19/2019) 100 each 11     blood glucose (NO BRAND SPECIFIED) lancing device Use to test blood sugars one times daily or as directed. (Patient not taking: Reported on 3/19/2019) 1 each 0     blood glucose  "monitoring (NO BRAND SPECIFIED) meter device kit Use to test blood sugar one times daily or as directed. (Patient not taking: Reported on 3/19/2019) 1 kit 0     blood glucose monitoring (NO BRAND SPECIFIED) test strip Use to test blood sugars one times daily or as directed (Patient not taking: Reported on 3/19/2019) 100 strip 3     gabapentin (NEURONTIN) 300 MG capsule Start with 1 capsule my mouth 3 times daily for 5 days, then increase to 1 capsule by mouth 3 times daily. (Patient not taking: Reported on 4/26/2019) 90 capsule 3     insulin pen needle (31G X 5 MM) 31G X 5 MM miscellaneous Use one pen needles daily or as directed. (Patient not taking: Reported on 3/19/2019) 100 each 3     insulin pen needle (31G X 6 MM) 31G X 6 MM miscellaneous Use once weekly or as directed. (Patient not taking: Reported on 3/19/2019) 30 each 1     lidocaine (LIDODERM) 5 % patch Place 1 patch onto the skin every 24 hours (Patient not taking: Reported on 4/26/2019) 30 patch 0     liraglutide - Weight Management (SAXENDA) 18 MG/3ML pen Inject 0.6 mg Subcutaneous daily for 7 days, THEN 1.2 mg daily for 7 days, THEN 1.8 mg daily. (Patient not taking: No sig reported) 3 pen 2     ofloxacin (OCUFLOX) 0.3 % ophthalmic solution PT USES THESE IN EARS NOT EYES/ PT       STATIN NOT PRESCRIBED (INTENTIONAL) 1 each daily Please choose reason not prescribed, below (Patient not taking: Reported on 3/19/2019)       TRULICITY 0.75 MG/0.5ML pen          ALLERGIES:   No Known Allergies    ROS:  CONSTITUTIONAL: NEGATIVE for fever, chills, change in weight  ENT/MOUTH: NEGATIVE for ear, mouth and throat problems  RESP: NEGATIVE for significant cough or SOB  CV: NEGATIVE for chest pain, palpitations or peripheral edema    OBJECTIVE:     /64   Pulse 101   Temp 99.6  F (37.6  C) (Oral)   Resp 20   Ht 1.556 m (5' 1.25\")   Wt 89.8 kg (198 lb)   LMP  (LMP Unknown)   SpO2 97%   Breastfeeding? No   BMI 37.11 kg/m    Body mass index is 37.11 " kg/m .  GENERAL: healthy, alert and no distress  RESP: lungs clear to auscultation - no rales, rhonchi or wheezes  CV: regular rate and rhythm, normal S1 S2, no S3 or S4, no murmur, click or rub, no peripheral edema and peripheral pulses strong  MS: no gross musculoskeletal defects noted, no edema  PSYCH: mentation appears normal, affect normal/bright  : mentation appears normal, affect normal and bright.    LAB RESULTS:   Reviewed in Epic    ASSESSMENT/PLAN:    1. Herniation of intervertebral disc between L5 and S1  Patient to try naproxen to see if tinnitus resumes.  I did caution her that this is a potential side effect with all NSAIDS.  - naproxen (NAPROSYN) 500 MG tablet; Take 1 tablet (500 mg) by mouth 2 times daily (with meals)  Dispense: 60 tablet; Refill: 1    2. Morbid obesity (H)  Patient to retry saxenda separate from naproxen to see if tinnitus occurs.  If so, we'll again try for ozempic prior auth.         Patient is to call the clinic if she experiences any adverse reactions.     Follow up with Provider - 8 weeks.         I spent 15 minutes of time with the patient and >50% of it was in education and counseling regarding weight loss.       CIPRIANO Perez [facility]

## 2019-04-29 ENCOUNTER — THERAPY VISIT (OUTPATIENT)
Dept: PHYSICAL THERAPY | Facility: CLINIC | Age: 34
End: 2019-04-29
Payer: OTHER MISCELLANEOUS

## 2019-04-29 DIAGNOSIS — Z98.890 HISTORY OF BACK SURGERY: ICD-10-CM

## 2019-04-29 DIAGNOSIS — G89.29 CHRONIC LEFT-SIDED LOW BACK PAIN WITH LEFT-SIDED SCIATICA: ICD-10-CM

## 2019-04-29 DIAGNOSIS — M54.42 CHRONIC LEFT-SIDED LOW BACK PAIN WITH LEFT-SIDED SCIATICA: ICD-10-CM

## 2019-04-29 PROCEDURE — 97110 THERAPEUTIC EXERCISES: CPT | Mod: GP | Performed by: PHYSICAL THERAPIST

## 2019-04-29 NOTE — PROGRESS NOTES
Subjective:  HPI                    Objective:  System    Physical Exam    General     ROS    Assessment/Plan:    PROGRESS  REPORT    Progress reporting period is from 03/25/2019 to 04/29/2019.       SUBJECTIVE  Subjective: Pt reports there has been change since beginning PT.  Not having pain down the leg any longer.  Feels like back continues to be sore, especially centrally.  Frustrated that there is pain at all.  Has difficulty getting through the work day without pain medication.    Current Pain level: (not rated numerically but pt notes is quite variable).     Initial Pain level: 3/10.   Changes in function:  Yes (See Goal flowsheet attached for changes in current functional level)  Adverse reaction to treatment or activity: None    OBJECTIVE  Objective: No increased discomfort standing lumbar flexion and extension.  TA MMT 2/5.  Negative Gerson, thigh thrust, iliac compression, iliac distraction, FADIR.  No tenderness to palpation.  Positive slump test.     ASSESSMENT/PLAN  Updated problem list and treatment plan: Diagnosis 1:  S/p back surgery -- see plan below  STG/LTGs have been met or progress has been made towards goals:  Yes (See Goal flow sheet completed today.)  Assessment of Progress: The patient's condition has potential to improve.  Self Management Plans:  Patient has been instructed in a home treatment program.  I have re-evaluated this patient and find that the nature, scope, duration and intensity of the therapy is appropriate for the medical condition of the patient.  Santos continues to require the following intervention to meet STG and LTG's:  PT    Recommendations:  Pleased that there has been some change in symptoms since beginning PT.  However, pt continues to be frustrated by presence of central back pain that escalates through her day.  Have been able to get some temporary relief with neurodynamic activity but not necessarily sustained.  F/u with surgical team later this week.  One visit  of PT remains authorized at this point.  Please advise.    Please refer to the daily flowsheet for treatment today, total treatment time and time spent performing 1:1 timed codes.

## 2019-05-02 ENCOUNTER — OFFICE VISIT (OUTPATIENT)
Dept: NEUROSURGERY | Facility: CLINIC | Age: 34
End: 2019-05-02

## 2019-05-02 VITALS
DIASTOLIC BLOOD PRESSURE: 83 MMHG | BODY MASS INDEX: 37.29 KG/M2 | WEIGHT: 199 LBS | HEART RATE: 77 BPM | SYSTOLIC BLOOD PRESSURE: 123 MMHG | OXYGEN SATURATION: 97 %

## 2019-05-02 DIAGNOSIS — Z98.890 HISTORY OF BACK SURGERY: Primary | ICD-10-CM

## 2019-05-02 ASSESSMENT — PAIN SCALES - GENERAL: PAINLEVEL: MILD PAIN (2)

## 2019-05-02 NOTE — NURSING NOTE
Chief Complaint   Patient presents with     RECHECK     [facility] RETURN 10 WK F/U        Elza Rodriguez, EMT

## 2019-05-02 NOTE — LETTER
2019       RE: Ministerio Bagley  [address]  [facility] [address]     Dear Colleague,    Thank you for referring your patient, Ministerio Bagley, to the [facility] NEUROSURGERY at [facility]. Please see a copy of my visit note below.    Service Date: 2019      Primary Care Physician      RE: Ministerio Bagley   MRN: [id number]   : 1985      Dear Doctor:        Ms. Bagley was seen in Neurosurgery Clinic today, 2019.  This is a 3-month postoperative followup after a left minimally invasive L5-S1 hemilamina microdisk for radiculopathy.  It has been a bit of a struggle for her over the past 3 months since the surgery, but she states that now she is feeling much better.  Her left-sided radicular pain has now completely resolved and she has 5/5 strength in her bilateral lower extremities with no neurological deficit.  Her major problem was that she had continued back pain and she found it difficult to go back to work and get through the day.  Today, however, she has since improved and has started physical therapy.  She states that physical therapy is helping her quite a bit and she wishes to continue, so we will provide her with continued referral for physical therapy today.  I advised her that she should continue to improve; however, after 3 more months if she still feels that she has not recovered to her satisfaction then she should contact us and we will refer her to the Pain Clinic.      It was my pleasure to see her in Neurosurgery Clinic today.      Sincerely,       Roberta Arevalo MD       D: 2019   T: 2019   MT: AKA      Name:     MINISTERIO BAGLEY   MRN:      [id number]        Account:      [id number]   :      1985           Service Date: 2019      Document: [id number]

## 2019-05-02 NOTE — PROGRESS NOTES
Service Date: 2019      Primary Care Physician      RE: Ministerio Bagley   MRN: [id number]   : 1985      Dear Doctor:        Ms. Bagley was seen in Neurosurgery Clinic today, 2019.  This is a 3-month postoperative followup after a left minimally invasive L5-S1 hemilamina microdisk for radiculopathy.  It has been a bit of a struggle for her over the past 3 months since the surgery, but she states that now she is feeling much better.  Her left-sided radicular pain has now completely resolved and she has 5/5 strength in her bilateral lower extremities with no neurological deficit.  Her major problem was that she had continued back pain and she found it difficult to go back to work and get through the day.  Today, however, she has since improved and has started physical therapy.  She states that physical therapy is helping her quite a bit and she wishes to continue, so we will provide her with continued referral for physical therapy today.  I advised her that she should continue to improve; however, after 3 more months if she still feels that she has not recovered to her satisfaction then she should contact us and we will refer her to the Pain Clinic.      It was my pleasure to see her in Neurosurgery Clinic today.      Sincerely,       MD SALLIE Yu MD             D: 2019   T: 2019   MT: AKA      Name:     MINISTERIO BAGLEY   MRN:      [id number]        Account:      [id number]   :      1985           Service Date: 2019      Document: [id number]

## 2019-05-03 ENCOUNTER — HEALTH MAINTENANCE LETTER (OUTPATIENT)
Age: 34
End: 2019-05-03

## 2019-06-24 ENCOUNTER — OFFICE VISIT (OUTPATIENT)
Dept: FAMILY MEDICINE | Facility: CLINIC | Age: 34
End: 2019-06-24
Payer: COMMERCIAL

## 2019-06-24 VITALS
BODY MASS INDEX: 34.83 KG/M2 | RESPIRATION RATE: 18 BRPM | HEART RATE: 97 BPM | SYSTOLIC BLOOD PRESSURE: 114 MMHG | HEIGHT: 61 IN | OXYGEN SATURATION: 98 % | TEMPERATURE: 98.5 F | WEIGHT: 184.5 LBS | DIASTOLIC BLOOD PRESSURE: 70 MMHG

## 2019-06-24 DIAGNOSIS — H60.543 ACUTE ECZEMATOID OTITIS EXTERNA OF BOTH EARS: ICD-10-CM

## 2019-06-24 DIAGNOSIS — R87.810 CERVICAL HIGH RISK HPV (HUMAN PAPILLOMAVIRUS) TEST POSITIVE: ICD-10-CM

## 2019-06-24 DIAGNOSIS — Z00.00 ROUTINE HISTORY AND PHYSICAL EXAMINATION OF ADULT: Primary | ICD-10-CM

## 2019-06-24 DIAGNOSIS — E66.01 MORBID OBESITY (H): ICD-10-CM

## 2019-06-24 DIAGNOSIS — E11.9 TYPE 2 DIABETES MELLITUS WITHOUT COMPLICATION, WITHOUT LONG-TERM CURRENT USE OF INSULIN (H): ICD-10-CM

## 2019-06-24 DIAGNOSIS — S90.221A SUBUNGUAL HEMATOMA OF RIGHT FOOT, INITIAL ENCOUNTER: ICD-10-CM

## 2019-06-24 DIAGNOSIS — R21 RASH: ICD-10-CM

## 2019-06-24 LAB
CHOLEST SERPL-MCNC: 207 MG/DL
HBA1C MFR BLD: 5.3 % (ref 0–5.6)
HDLC SERPL-MCNC: 43 MG/DL
LDLC SERPL CALC-MCNC: 143 MG/DL
NONHDLC SERPL-MCNC: 164 MG/DL
TRIGL SERPL-MCNC: 107 MG/DL

## 2019-06-24 PROCEDURE — 80061 LIPID PANEL: CPT | Performed by: NURSE PRACTITIONER

## 2019-06-24 PROCEDURE — 36415 COLL VENOUS BLD VENIPUNCTURE: CPT | Performed by: NURSE PRACTITIONER

## 2019-06-24 PROCEDURE — 83036 HEMOGLOBIN GLYCOSYLATED A1C: CPT | Performed by: NURSE PRACTITIONER

## 2019-06-24 PROCEDURE — 99395 PREV VISIT EST AGE 18-39: CPT | Performed by: NURSE PRACTITIONER

## 2019-06-24 RX ORDER — BETAMETHASONE DIPROPIONATE 0.5 MG/G
CREAM TOPICAL 2 TIMES DAILY
Qty: 50 G | Refills: 1 | Status: SHIPPED | OUTPATIENT
Start: 2019-06-24 | End: 2019-08-06

## 2019-06-24 RX ORDER — FLUOCINOLONE ACETONIDE 0.11 MG/ML
1 OIL AURICULAR (OTIC) DAILY
Qty: 20 ML | Refills: 1 | Status: SHIPPED | OUTPATIENT
Start: 2019-06-24 | End: 2019-08-06

## 2019-06-24 ASSESSMENT — ENCOUNTER SYMPTOMS
HEMATOCHEZIA: 0
BREAST MASS: 0
DIARRHEA: 0
FREQUENCY: 0
NAUSEA: 0
DIZZINESS: 0
DYSURIA: 0
HEARTBURN: 0
FEVER: 0
PARESTHESIAS: 0
PALPITATIONS: 0
ABDOMINAL PAIN: 0
EYE PAIN: 0
NERVOUS/ANXIOUS: 0
JOINT SWELLING: 0
MYALGIAS: 0
COUGH: 0
SORE THROAT: 0
HEADACHES: 0
CONSTIPATION: 0
SHORTNESS OF BREATH: 0
WEAKNESS: 0
HEMATURIA: 0
ARTHRALGIAS: 0

## 2019-06-24 ASSESSMENT — MIFFLIN-ST. JEOR: SCORE: 1473.23

## 2019-06-24 NOTE — PROGRESS NOTES
SUBJECTIVE:   CC: Santos Bagley is an 34 year old woman who presents for preventive health visit.     Healthy Habits:     Getting at least 3 servings of Calcium per day:  Yes    Bi-annual eye exam:  NO    Dental care twice a year:  Yes    Sleep apnea or symptoms of sleep apnea:  None    Diet:  Other    Frequency of exercise:  4-5 days/week    Duration of exercise:  Greater than 60 minutes    Taking medications regularly:  Yes    Medication side effects:  None    PHQ-2 Total Score: 0    Additional concerns today:  No      Wt Readings from Last 5 Encounters:   06/24/19 83.7 kg (184 lb 8 oz)   05/02/19 90.3 kg (199 lb)   04/26/19 89.8 kg (198 lb)   04/12/19 90.3 kg (199 lb)   03/19/19 90.3 kg (199 lb)       Patient returns today for medical weight loss follow-up visit. Patient was last seen by me on 4/26/2019 and has lost 14 pounds and 4 % body fat.      Patient notes her back pain is gone.  She is more active.  She is participating in volleyball and is running 3 times per week.  Patient denies any side effects from liraglutide at 1.8.    Patient feels her diet is good.  She continues to eat a moderate carb, protein rich, fruit and vegetables.  She is monitoring labels and is eating mostly unprocessed food.    Patient notes sleep is good.  She feels rested.    Patient has lost 40 lbs in the past 9 months.    Diabetes Follow-up      How often are you checking your blood sugar? Not at all    What time of day are you checking your blood sugars (select all that apply)?  Before meals    Have you had any blood sugars above 200?  No    Have you had any blood sugars below 70?  No    What symptoms do you notice when your blood sugar is low?  None    What concerns do you have today about your diabetes? None     Do you have any of these symptoms? (Select all that apply)  No numbness or tingling in feet.  No redness, sores or blisters on feet.  No complaints of excessive thirst.  No reports of blurry vision.  No significant  changes to weight.     Have you had a diabetic eye exam in the last 12 months? No    BP Readings from Last 2 Encounters:   06/24/19 114/70   05/02/19 123/83     Hemoglobin A1C (%)   Date Value   01/04/2019 5.5   09/25/2018 6.5 (H)     LDL Cholesterol Calculated (mg/dL)   Date Value   09/25/2018     Cannot estimate LDL when triglyceride exceeds 400 mg/dL   09/17/2013 174 (H)     LDL Cholesterol Direct (mg/dL)   Date Value   09/25/2018 177 (H)       Diabetes Management Resources               Today's PHQ-2 Score:   PHQ-2 ( 1999 Pfizer) 6/24/2019   Q1: Little interest or pleasure in doing things 0   Q2: Feeling down, depressed or hopeless 0   PHQ-2 Score 0   Q1: Little interest or pleasure in doing things Not at all   Q2: Feeling down, depressed or hopeless Not at all   PHQ-2 Score 0       Abuse: Current or Past(Physical, Sexual or Emotional)- No  Do you feel safe in your environment? Yes    Social History     Tobacco Use     Smoking status: Former Smoker     Packs/day: 0.50     Years: 12.00     Pack years: 6.00     Types: Cigarettes     Smokeless tobacco: Never Used   Substance Use Topics     Alcohol use: No         Alcohol Use 6/24/2019   Prescreen: >3 drinks/day or >7 drinks/week? No   Prescreen: >3 drinks/day or >7 drinks/week? -       Reviewed orders with patient.  Reviewed health maintenance and updated orders accordingly - Yes  Lab work is in process  BP Readings from Last 3 Encounters:   06/24/19 114/70   05/02/19 123/83   04/26/19 112/64    Wt Readings from Last 3 Encounters:   06/24/19 83.7 kg (184 lb 8 oz)   05/02/19 90.3 kg (199 lb)   04/26/19 89.8 kg (198 lb)                  Patient Active Problem List   Diagnosis     Chronic tonsillitis     Hyperlipidemia LDL goal <160     Impaired fasting glucose     Cervical high risk HPV (human papillomavirus) test positive     Morbid obesity (H)     Type 2 diabetes mellitus without complication, without long-term current use of insulin (H)     Early onset of delivery  with baby delivered     Encounter for supervision of other normal pregnancy, unspecified trimester      labor     Chronic left-sided low back pain with left-sided sciatica     History of back surgery     Past Surgical History:   Procedure Laterality Date     ABDOMEN SURGERY      tubal ligation     BIOPSY      leep proceduce     DISCECTOMY LUMBAR MINIMALLY INVASIVE ONE LEVEL Left 2019    Procedure: Minimally Invasive Left Lumbar 5-Sacral 1 Microdiscectomy;  Surgeon: Roberta Arevalo MD;  Location: [facility] OR     TONSILLECTOMY      uvula removed at same time     TUBAL LIGATION         Social History     Tobacco Use     Smoking status: Former Smoker     Packs/day: 0.50     Years: 12.00     Pack years: 6.00     Types: Cigarettes     Smokeless tobacco: Never Used   Substance Use Topics     Alcohol use: No     Family History   Family history unknown: Yes         Current Outpatient Medications   Medication Sig Dispense Refill     ACE/ARB/ARNI NOT PRESCRIBED (INTENTIONAL) Please choose reason not prescribed, below       augmented betamethasone dipropionate (DIPROLENE-AF) 0.05 % external cream Apply topically 2 times daily To neck rash 50 g 1     blood glucose (NO BRAND SPECIFIED) lancets standard Use to test blood sugar one times daily or as directed. 100 each 11     blood glucose (NO BRAND SPECIFIED) lancing device Use to test blood sugars one times daily or as directed. 1 each 0     blood glucose monitoring (NO BRAND SPECIFIED) meter device kit Use to test blood sugar one times daily or as directed. 1 kit 0     blood glucose monitoring (NO BRAND SPECIFIED) test strip Use to test blood sugars one times daily or as directed 100 strip 3     fluocinolone acetonide 0.01 % OIL Place 1 Application in ear(s) daily 20 mL 1     insulin pen needle (31G X 6 MM) 31G X 6 MM miscellaneous Use once weekly or as directed. 30 each 1     liraglutide - Weight Management (SAXENDA) 18 MG/3ML pen Inject 1.8 mg Subcutaneous  daily 3 pen 2     STATIN NOT PRESCRIBED (INTENTIONAL) 1 each daily Please choose reason not prescribed, below       Recent Labs   Lab Test 01/04/19  1516 09/25/18  1209 03/22/18  1017 09/17/13  1040 08/17/12  0804  11/03/11  1605   A1C 5.5 6.5*  --   --   --   --   --    LDL  --  Cannot estimate LDL when triglyceride exceeds 400 mg/dL  177*  --  174* 152*   < >  --    HDL  --  33*  --  31* 31*   < >  --    TRIG  --  546*  --  106 229*   < >  --    CR 0.62  --   --   --   --   --  0.64   GFRESTIMATED >90  --   --   --   --   --  >90   GFRESTBLACK >90  --   --   --   --   --  >90   POTASSIUM 4.2  --   --   --   --   --  4.0   TSH  --   --  1.07  --   --   --  1.43    < > = values in this interval not displayed.        Mammogram not appropriate for this patient based on age.    Pertinent mammograms are reviewed under the imaging tab.  History of abnormal Pap smear:   Last 3 Pap and HPV Results:   PAP / HPV Latest Ref Rng & Units 3/22/2018 8/23/2012 1/7/2010   PAP - NIL NIL NIL   HPV 16 DNA NEG:Negative Negative - -   HPV 18 DNA NEG:Negative Negative - -   OTHER HR HPV NEG:Negative Positive(A) - -     PAP / HPV Latest Ref Rng & Units 3/22/2018 8/23/2012 1/7/2010   PAP - NIL NIL NIL   HPV 16 DNA NEG:Negative Negative - -   HPV 18 DNA NEG:Negative Negative - -   OTHER HR HPV NEG:Negative Positive(A) - -     Reviewed and updated as needed this visit by clinical staff  Tobacco  Allergies  Meds         Reviewed and updated as needed this visit by Provider  Allergies  Meds            Review of Systems   Constitutional: Negative for fever.   HENT: Negative for congestion, ear pain, hearing loss and sore throat.    Eyes: Negative for pain and visual disturbance.   Respiratory: Negative for cough and shortness of breath.    Cardiovascular: Negative for chest pain, palpitations and peripheral edema.   Gastrointestinal: Negative for abdominal pain, constipation, diarrhea, heartburn, hematochezia and nausea.   Breasts:   "Negative for tenderness, breast mass and discharge.   Genitourinary: Negative for dysuria, frequency, genital sores, hematuria, pelvic pain, urgency, vaginal bleeding and vaginal discharge.   Musculoskeletal: Negative for arthralgias, joint swelling and myalgias.   Skin: Positive for rash.   Neurological: Negative for dizziness, weakness, headaches and paresthesias.   Psychiatric/Behavioral: Negative for mood changes. The patient is not nervous/anxious.           OBJECTIVE:   /70   Pulse 97   Temp 98.5  F (36.9  C) (Oral)   Resp 18   Ht 1.556 m (5' 1.25\")   Wt 83.7 kg (184 lb 8 oz)   LMP 05/24/2019 (Approximate)   SpO2 98%   BMI 34.58 kg/m    Physical Exam  GENERAL: healthy, alert and no distress  EYES: Eyes grossly normal to inspection, PERRL and conjunctivae and sclerae normal  HENT: normal cephalic/atraumatic, TM's normal, nose and mouth without ulcers or lesions, oropharynx clear, oral mucous membranes moist and dry flaking skin noted to external ear and ear canals bilaterally  NECK: no adenopathy, no asymmetry, masses, or scars and thyroid normal to palpation  RESP: lungs clear to auscultation - no rales, rhonchi or wheezes  BREAST: patient deferred  CV: regular rate and rhythm, normal S1 S2, no S3 or S4, no murmur, click or rub, no peripheral edema and peripheral pulses strong  ABDOMEN: soft, nontender, no hepatosplenomegaly, no masses and bowel sounds normal   (female): deferred  MS: no gross musculoskeletal defects noted, no edema  SKIN: erythematous plaque noted to right neck, posterior neck  PSYCH: mentation appears normal, affect normal/bright  Diabetic foot exam: hematoma noted beneath right great nail    Diagnostic Test Results:  pending    ASSESSMENT/PLAN:   1. Routine history and physical examination of adult      2. Morbid obesity (H)  Patient is doing well with weight loss and has had nearly a 20% weight loss.  Continue diet and exercise changes and continue liraglutide as below.  - " "liraglutide - Weight Management (SAXENDA) 18 MG/3ML pen; Inject 1.8 mg Subcutaneous daily  Dispense: 3 pen; Refill: 2    3. Type 2 diabetes mellitus without complication, without long-term current use of insulin (H)    - liraglutide - Weight Management (SAXENDA) 18 MG/3ML pen; Inject 1.8 mg Subcutaneous daily  Dispense: 3 pen; Refill: 2  - Hemoglobin A1c  - Lipid panel reflex to direct LDL Fasting    4. Cervical high risk HPV (human papillomavirus) test positive  Patient declined pap.  I encouraged her to schedule soon.    5. Subungual hematoma of right foot, initial encounter    - PODIATRY/FOOT & ANKLE SURGERY REFERRAL    6. Acute eczematoid otitis externa of both ears    - fluocinolone acetonide 0.01 % OIL; Place 1 Application in ear(s) daily  Dispense: 20 mL; Refill: 1    7. Rash  Possibly psoriasis, though she noted it after applying a new cream to her neck.  Continue to monitor and follow-up with derm if not improving.  - augmented betamethasone dipropionate (DIPROLENE-AF) 0.05 % external cream; Apply topically 2 times daily To neck rash  Dispense: 50 g; Refill: 1    COUNSELING:  Reviewed preventive health counseling, as reflected in patient instructions       Regular exercise       Healthy diet/nutrition    Estimated body mass index is 34.58 kg/m  as calculated from the following:    Height as of this encounter: 1.556 m (5' 1.25\").    Weight as of this encounter: 83.7 kg (184 lb 8 oz).    Weight management plan: Discussed healthy diet and exercise guidelines     reports that she has quit smoking. Her smoking use included cigarettes. She has a 6.00 pack-year smoking history. She has never used smokeless tobacco.      Counseling Resources:  ATP IV Guidelines  Pooled Cohorts Equation Calculator  Breast Cancer Risk Calculator  FRAX Risk Assessment  ICSI Preventive Guidelines  Dietary Guidelines for Americans, 2010  USDA's MyPlate  ASA Prophylaxis  Lung CA Screening    CIPRIANO Perez [facility] " [place]

## 2019-06-24 NOTE — RESULT ENCOUNTER NOTE
Dear Santos,    Your recent test results are attached.      Improved cholesterol.  Your triglycerides have significantly decreased.  Your HDL cholesterol has increased (good cholesterol).  Your LDL cholesterol has decreased from 77 to 143.    Good work :)    If you have any questions please feel free to contact [phone] or myself via [employer].    Sincerely,  Gina Maldonado, CNP

## 2019-06-24 NOTE — RESULT ENCOUNTER NOTE
Dear Santos,    Your recent test results are attached.      Good 3 month blood sugar average.    If you have any questions please feel free to contact [phone] or myself via [employer].    Sincerely,  Gina Maldonado, CNP

## 2019-06-24 NOTE — PATIENT INSTRUCTIONS
Add strength training two 20 minute sessions per week.  Try the 7 minute workout to start.  Schedule eye exam.  Have them send me a copy of exam notes.

## 2019-06-28 PROBLEM — G89.29 CHRONIC LEFT-SIDED LOW BACK PAIN WITH LEFT-SIDED SCIATICA: Status: RESOLVED | Noted: 2019-03-25 | Resolved: 2019-06-28

## 2019-06-28 PROBLEM — Z98.890 HISTORY OF BACK SURGERY: Status: RESOLVED | Noted: 2019-03-25 | Resolved: 2019-06-28

## 2019-06-28 PROBLEM — M54.42 CHRONIC LEFT-SIDED LOW BACK PAIN WITH LEFT-SIDED SCIATICA: Status: RESOLVED | Noted: 2019-03-25 | Resolved: 2019-06-28

## 2019-06-28 NOTE — PROGRESS NOTES
Pt last seen in PT 04/29.  She has since had f/u with surgeon and we have exchanged 2nd [employer] messages as in chart.  Consider note dated 04/29 to serve as final summary.

## 2019-07-29 ENCOUNTER — TELEPHONE (OUTPATIENT)
Dept: FAMILY MEDICINE | Facility: CLINIC | Age: 34
End: 2019-07-29

## 2019-07-29 NOTE — TELEPHONE ENCOUNTER
Prior authorization required for : Saxenda 18mg/3ml soln  Insurance plan: [employer]  Patient Id: [id number]  Bin Number: [id number]  Pcn: N/A  Help Desk Number: [phone]    Please fax over an alternative medication to the pharmacy or if you are going to pursue a prior authorization please contact the pharmacy and patient when approved. Thanks!    Valeria Black [facility] [place]

## 2019-08-06 ENCOUNTER — ANCILLARY PROCEDURE (OUTPATIENT)
Dept: GENERAL RADIOLOGY | Facility: CLINIC | Age: 34
End: 2019-08-06
Attending: PODIATRIST
Payer: COMMERCIAL

## 2019-08-06 ENCOUNTER — OFFICE VISIT (OUTPATIENT)
Dept: PODIATRY | Facility: CLINIC | Age: 34
End: 2019-08-06
Payer: COMMERCIAL

## 2019-08-06 VITALS
SYSTOLIC BLOOD PRESSURE: 107 MMHG | WEIGHT: 187 LBS | BODY MASS INDEX: 35.05 KG/M2 | HEART RATE: 85 BPM | DIASTOLIC BLOOD PRESSURE: 72 MMHG

## 2019-08-06 DIAGNOSIS — M21.6X1 PRONATION OF BOTH FEET: Primary | ICD-10-CM

## 2019-08-06 DIAGNOSIS — M79.672 PAIN IN LEFT FOOT: ICD-10-CM

## 2019-08-06 DIAGNOSIS — M21.6X2 PRONATION OF BOTH FEET: ICD-10-CM

## 2019-08-06 DIAGNOSIS — M21.6X1 PRONATION OF BOTH FEET: ICD-10-CM

## 2019-08-06 DIAGNOSIS — M21.6X2 PRONATION OF BOTH FEET: Primary | ICD-10-CM

## 2019-08-06 PROCEDURE — 73630 X-RAY EXAM OF FOOT: CPT | Mod: LT

## 2019-08-06 PROCEDURE — 99203 OFFICE O/P NEW LOW 30 MIN: CPT | Performed by: PODIATRIST

## 2019-08-06 NOTE — PROGRESS NOTES
S:  Complains of left foot pain.  Points to lateral TMTJs.  Has had this for 1 years.  Describes it as a burning pain.  Aggrevated by activity and relieved by rest.  She is at a job on her feet all day.  Had back surgery recently and all her pain went away.  Came back in a month.  Slippers around house.  Denies erythema, edema, ecchymosis.  She has diabetes, diet controlled.      ROS:  A 10-point review of systems was performed and is positive for that noted in the HPI and as seen below.  All other areas are negative.      No Known Allergies    Current Outpatient Medications   Medication Sig Dispense Refill     ACE/ARB/ARNI NOT PRESCRIBED (INTENTIONAL) Please choose reason not prescribed, below       STATIN NOT PRESCRIBED (INTENTIONAL) 1 each daily Please choose reason not prescribed, below         Patient Active Problem List   Diagnosis     Chronic tonsillitis     Hyperlipidemia LDL goal <160     Impaired fasting glucose     Cervical high risk HPV (human papillomavirus) test positive     Morbid obesity (H)     Type 2 diabetes mellitus without complication, without long-term current use of insulin (H)     Early onset of delivery with baby delivered     Encounter for supervision of other normal pregnancy, unspecified trimester      labor       Past Medical History:   Diagnosis Date     Cervical high risk HPV (human papillomavirus) test positive 2018    See Problem List     DM (diabetes mellitus) (H)      Lumbar radiculopathy      Severe Cervical Dysplasia     LEEP, Needs annual pap smear screening       Past Surgical History:   Procedure Laterality Date     ABDOMEN SURGERY      tubal ligation     BIOPSY      leep proceduce     DISCECTOMY LUMBAR MINIMALLY INVASIVE ONE LEVEL Left 2019    Procedure: Minimally Invasive Left Lumbar 5-Sacral 1 Microdiscectomy;  Surgeon: Roberta Arevalo MD;  Location: [facility] OR     TONSILLECTOMY      uvula removed at same time     TUBAL LIGATION  2009       Family  History   Family history unknown: Yes       Social History     Tobacco Use     Smoking status: Former Smoker     Packs/day: 0.50     Years: 12.00     Pack years: 6.00     Types: Cigarettes     Smokeless tobacco: Never Used   Substance Use Topics     Alcohol use: No         Exam:  Vitals: /72 (BP Location: Right arm)   Pulse 85   Wt 84.8 kg (187 lb)   BMI 35.05 kg/m    BMI: Body mass index is 35.05 kg/m .  Height: Data Unavailable    Constitutional/ general:  Pt is in no apparent distress, appears well-nourished.  Cooperative with history and physical exam.     Psych:  The patient answered questions appropriately.  Normal affect.  Seems to have reasonable expectations, in terms of treatment.     Eyes:  Visual scanning/ tracking without deficit.     Ears:  Response to auditory stimuli is normal.  No  hearing aid devices.  Auricles in proper alignment.     Lymphatic:  Popliteal lymph nodes not enlarged.     Lungs:  Non labored breathing, non labored speech. No cough.  No audible wheezing. Even, quiet breathing.       Vascular:  Pedal pulses are palpable bilaterally for both the DP and PT arteries.  CFT < 3 sec.  No edema.  No varicosities.  Pedal hair growth noted.     Neuro:  Alert and oriented x 3. Coordinated gait.  Light touch sensation is intact to the L4, L5, S1 distributions. No obvious deficits.  No evidence of neurological-based weakness, spasticity, or contracture in the lower extremities.    Derm: Normal texture and turgor.  No erythema, ecchymosis, or cyanosis.  No open lesions.     Musculoskeletal:    Lower extremity muscle strength is normal.  Patient is ambulatory without an assistive device or brace.  No gross deformities.  Normal ROM all fore foot and rearfoot joints.  No equinus.  With weightbearing patient has bilateral pronation with left being worse.  No pain with ROM.   No pain with palpation anywhere on left foot.  No pain stressing any muscle compartments.  No edema, eccymosis,  masses    Radiographic:  X-rays normal.    A:  Pronation and left foot pain    P:  X-rays taken today left foot.  Discussed the cause of this with the patient and how related to pronation.  Good house shoes at all times and made suggestions.  Rx for orthotics.  Should wear these in stiff tennis shoes and I made suggestions.    Will call if she would like physical therapy.  RETURN TO CLINIC PRN.    Servando Kenyon DPM, FACFAS

## 2019-08-06 NOTE — PATIENT INSTRUCTIONS
Weight management plan: Patient was referred to their PCP to discuss a diet and exercise plan.  We wish you continued good healing. If you have any questions or concerns, please do not hesitate to contact us at [phone]    Please remember to call and schedule a follow up appointment if one was recommended at your earliest convenience.   PODIATRY CLINIC HOURS  TELEPHONE NUMBER    Dr. Servando Kenyon D.P.M [facility]    Clinics:  [facility]    Natalie Vargas [facility]   Tuesday 1PM-6PM  [place]  Wednesday 7AM-2PM  [facility]  Thursday 10AM-6PM  [place]  Friday 7AM-3PM  [place]  Specialty schedulers:   [phone] to make an appointment with any Specialty Provider.        Urgent Care locations:    [facility] Monday-Friday 5 pm - 9 pm. Saturday-Sunday 9 am -5pm    Monday-Friday 11 am - 9 pm Saturday 9 am - 5 pm     Monday-Sunday 12 noon-8PM [phone]     If you need a medication refill, please contact us you may need lab work and/or a follow up visit prior to your refill (i.e. Antifungal medications).    Jenn Rykerthart (secure e-mail communication and access to your chart) to send a message or to make an appointment.    If MRI needed please call [place] [facility] at [phone]

## 2019-08-06 NOTE — LETTER
2019         RE: Santos Bagley  [address]  [facility] [address]        Dear Colleague,    Thank you for referring your patient, Santos Bagley, to the [facility]. Please see a copy of my visit note below.    S:  Complains of left foot pain.  Points to lateral TMTJs.  Has had this for 1 years.  Describes it as a burning pain.  Aggrevated by activity and relieved by rest.  She is at a job on her feet all day.  Had back surgery recently and all her pain went away.  Came back in a month.  Slippers around house.  Denies erythema, edema, ecchymosis.  She has diabetes, diet controlled.      ROS:  A 10-point review of systems was performed and is positive for that noted in the HPI and as seen below.  All other areas are negative.      No Known Allergies    Current Outpatient Medications   Medication Sig Dispense Refill     ACE/ARB/ARNI NOT PRESCRIBED (INTENTIONAL) Please choose reason not prescribed, below       STATIN NOT PRESCRIBED (INTENTIONAL) 1 each daily Please choose reason not prescribed, below         Patient Active Problem List   Diagnosis     Chronic tonsillitis     Hyperlipidemia LDL goal <160     Impaired fasting glucose     Cervical high risk HPV (human papillomavirus) test positive     Morbid obesity (H)     Type 2 diabetes mellitus without complication, without long-term current use of insulin (H)     Early onset of delivery with baby delivered     Encounter for supervision of other normal pregnancy, unspecified trimester      labor       Past Medical History:   Diagnosis Date     Cervical high risk HPV (human papillomavirus) test positive 2018    See Problem List     DM (diabetes mellitus) (H)      Lumbar radiculopathy      Severe Cervical Dysplasia     LEEP, Needs annual pap smear screening       Past Surgical History:   Procedure Laterality Date     ABDOMEN SURGERY      tubal ligation     BIOPSY      leep proceduce     DISCECTOMY LUMBAR MINIMALLY  INVASIVE ONE LEVEL Left 2/5/2019    Procedure: Minimally Invasive Left Lumbar 5-Sacral 1 Microdiscectomy;  Surgeon: Roberta Arevalo MD;  Location: [facility] OR     TONSILLECTOMY  2013    uvula removed at same time     TUBAL LIGATION  2009       Family History   Family history unknown: Yes       Social History     Tobacco Use     Smoking status: Former Smoker     Packs/day: 0.50     Years: 12.00     Pack years: 6.00     Types: Cigarettes     Smokeless tobacco: Never Used   Substance Use Topics     Alcohol use: No         Exam:  Vitals: /72 (BP Location: Right arm)   Pulse 85   Wt 84.8 kg (187 lb)   BMI 35.05 kg/m     BMI: Body mass index is 35.05 kg/m .  Height: Data Unavailable    Constitutional/ general:  Pt is in no apparent distress, appears well-nourished.  Cooperative with history and physical exam.     Psych:  The patient answered questions appropriately.  Normal affect.  Seems to have reasonable expectations, in terms of treatment.     Eyes:  Visual scanning/ tracking without deficit.     Ears:  Response to auditory stimuli is normal.  No  hearing aid devices.  Auricles in proper alignment.     Lymphatic:  Popliteal lymph nodes not enlarged.     Lungs:  Non labored breathing, non labored speech. No cough.  No audible wheezing. Even, quiet breathing.       Vascular:  Pedal pulses are palpable bilaterally for both the DP and PT arteries.  CFT < 3 sec.  No edema.  No varicosities.  Pedal hair growth noted.     Neuro:  Alert and oriented x 3. Coordinated gait.  Light touch sensation is intact to the L4, L5, S1 distributions. No obvious deficits.  No evidence of neurological-based weakness, spasticity, or contracture in the lower extremities.    Derm: Normal texture and turgor.  No erythema, ecchymosis, or cyanosis.  No open lesions.     Musculoskeletal:    Lower extremity muscle strength is normal.  Patient is ambulatory without an assistive device or brace.  No gross deformities.  Normal ROM all fore foot  and rearfoot joints.  No equinus.  With weightbearing patient has bilateral pronation with left being worse.  No pain with ROM.   No pain with palpation anywhere on left foot.  No pain stressing any muscle compartments.  No edema, eccymosis, masses    Radiographic:  X-rays normal.    A:  Pronation and left foot pain    P:  X-rays taken today left foot.  Discussed the cause of this with the patient and how related to pronation.  Good house shoes at all times and made suggestions.  Rx for orthotics.  Should wear these in stiff tennis shoes and I made suggestions.    Will call if she would like physical therapy.  RETURN TO CLINIC PRN.    Servando Kenyon DPM, FACFAS           Again, thank you for allowing me to participate in the care of your patient.        Sincerely,        Servando Kenyon DPM

## 2019-08-08 NOTE — TELEPHONE ENCOUNTER
Left patient VM to return call to pink team to go over patient's medication list  Rohit Yates CMA on 8/8/2019 at 8:48 AM

## 2019-08-08 NOTE — TELEPHONE ENCOUNTER
Several meds including Saxenda and DM testing supplies were removed from med list by CMA during a podiatry visit  Please call patient to verify if they were taken off because she is not using or were they taken off by mistake?  Was there a reason she stopped the Saxenda injections?  Is she still checking her BG?    Catia Hu RN

## 2019-08-12 NOTE — TELEPHONE ENCOUNTER
2nd attempt. Left patient VM to return call to pink team to go over patient's medication list  Rohit Yates CMA on 8/12/2019 at 9:17 AM

## 2019-08-16 NOTE — TELEPHONE ENCOUNTER
Closing chart due to multiple attempts to reach patient. [email or web address] never read either  Rohit Yates CMA on 8/16/2019 at 7:06 AM

## 2019-09-23 NOTE — TELEPHONE ENCOUNTER
Patient returned call.  DOS: 2/5/19  GAIL Minimally Invasive Left Lumbar 5-Sacral 1 Microdiscectomy.    Pt states left leg pain remains and has not eased up.  States left leg pain the same as prior to surgery.  Pt has about 4 Oxycodone left, taking every 6 hours as prescribed.  States medication is not relieving leg discomfort. States prior to surgery Gabapentin helped ease leg discomfort but that is not helping at this time.    Will research with RAÚL White and get back to patient.  Pt asking if she can take Ibuprofen for discomfort when Oxycodone is gone or what medication might help the leg discomfort.       Number Of Coats: 1 Frost (0,1+,2+,3+,4+): 1+ Post Peel Care: After the procedure, the treatment area was washed with soap and water, and a post-peel cream was applied. Sun protection and post-care instructions were reviewed with the patient.  Marla Dugan Consent: Prior to the procedure, written consent was obtained and risks were reviewed, including but not limited to: redness, peeling, blistering, pigmentary change, scarring, infection, and pain. Post-Care Instructions: I reviewed with the patient in detail post-care instructions. Patient should avoid sun exposure and wear sun protection. Prep: The treated area was degreased with pre-peel cleanser, and vaseline was applied for protection of mucous membranes. Treatment Number: 0 Chemical Peel: 10% TCA Detail Level: Zone

## 2019-10-22 ENCOUNTER — MYC MEDICAL ADVICE (OUTPATIENT)
Dept: FAMILY MEDICINE | Facility: CLINIC | Age: 34
End: 2019-10-22

## 2019-11-04 ENCOUNTER — HEALTH MAINTENANCE LETTER (OUTPATIENT)
Age: 34
End: 2019-11-04

## 2019-11-25 ENCOUNTER — TELEPHONE (OUTPATIENT)
Dept: NEUROSURGERY | Facility: CLINIC | Age: 34
End: 2019-11-25

## 2019-11-25 NOTE — TELEPHONE ENCOUNTER
[facility] Call Center    Phone Message    May a detailed message be left on voicemail: yes    Reason for Call: Other: patient calling stating she is calling to schedule a follow up, Patient last saw Dr. Arevalo in May of 2019. Please call to discuss thank you.      Action Taken: Message routed to:  [facility] neurosurgery

## 2020-02-16 ENCOUNTER — HEALTH MAINTENANCE LETTER (OUTPATIENT)
Age: 35
End: 2020-02-16

## 2020-02-17 ENCOUNTER — TELEPHONE (OUTPATIENT)
Dept: FAMILY MEDICINE | Facility: CLINIC | Age: 35
End: 2020-02-17

## 2020-02-17 NOTE — TELEPHONE ENCOUNTER
Panel Management Review      Patient has the following on her problem list:     Diabetes    ASA: Passed    Last A1C  Lab Results   Component Value Date    A1C 5.3 06/24/2019    A1C 5.5 01/04/2019    A1C 6.5 09/25/2018     A1C tested: Passed    Last LDL:    Lab Results   Component Value Date    CHOL 207 06/24/2019     Lab Results   Component Value Date    HDL 43 06/24/2019     Lab Results   Component Value Date     06/24/2019     Lab Results   Component Value Date    TRIG 107 06/24/2019     Lab Results   Component Value Date    CHOLHDLRATIO 7.4 09/17/2013     Lab Results   Component Value Date    NHDL 164 06/24/2019       Is the patient on a Statin? YES             Is the patient on Aspirin? YES    Medications     HMG CoA Reductase Inhibitors     STATIN NOT PRESCRIBED (INTENTIONAL)             Last three blood pressure readings:  BP Readings from Last 3 Encounters:   08/06/19 107/72   06/24/19 114/70   05/02/19 123/83       Date of last diabetes office visit: 624/19     Tobacco History:     History   Smoking Status     Former Smoker     Packs/day: 0.50     Years: 12.00     Types: Cigarettes   Smokeless Tobacco     Never Used           Composite cancer screening  Chart review shows that this patient is due/due soon for the following Pap Smear  Summary:    Patient is due/failing the following:   Diabetic eye exam, diabetic management, A1C and PAP    Action needed:   Patient needs office visit for Diabetic eye exam, diabetic management, A1C.    Type of outreach:    Sent letter.    Questions for provider review:    Wes STRICKLAND CMA       Chart routed to none .

## 2020-02-17 NOTE — LETTER
February 17, 2020          Santos Bagley,  [address]  [facility] [address]        Dear Santos Bagley      Monitoring and managing your preventative and chronic health conditions are very important to us. Our records indicate that you have not scheduled for Appointment with your provider, Diabetic Check and HgbA1C  Also please make eye appointment which was recommended by Gina Maldonado CNP.        If you have received your health care elsewhere, please call the clinic so the information can be documented in your chart.    Please call [phone] or message us through your [employer] account to schedule an appointment or provide information for your chart.     Feel free to contact us if you have any questions or concerns!    I look forward to seeing you and working with you on your health care needs.     Sincerely,       Your [facility] Team/dl

## 2020-02-24 ENCOUNTER — TELEPHONE (OUTPATIENT)
Dept: NEUROSURGERY | Facility: CLINIC | Age: 35
End: 2020-02-24

## 2020-03-23 NOTE — TELEPHONE ENCOUNTER
Faxed received from [place] [employer] via [employer]. Writer completed and faxed back to Mirian Brown at [phone]. Form scanned into patient's EMR.

## 2020-05-26 ENCOUNTER — VIRTUAL VISIT (OUTPATIENT)
Dept: NEUROSURGERY | Facility: CLINIC | Age: 35
End: 2020-05-26
Payer: COMMERCIAL

## 2020-05-26 DIAGNOSIS — M54.16 LUMBAR RADICULOPATHY: Primary | ICD-10-CM

## 2020-05-26 NOTE — PROGRESS NOTES
Service Date: 2020      RE: Ministerio Bagley   MRN: [id number]   : 1985      Dear Doctor:      Ms. Bagley was last seen in Neurosurgery Clinic on 2019 for a 3-month postoperative visit.  She had a left minimally invasive L5-S1 hemilam microdisk for radiculopathy.  She struggled a little bit after the surgery, but at the 3-month jarrett, she was doing much better.  Her left-sided radicular pain had completely resolved and she had no neurological issues.  She did have some ongoing back pain but had started physical therapy and this was improving.  At that visit, we said that if after another 3 months of physical therapy she had continued back pain she could call us and we could refer her to the Pain Clinic.  That was the last time that we saw her.      Today we did a 1-year check in with her.  She is doing very well and she is very happy with the results of the surgery.  She says that she has a little back pain now and then, but overall, she can tolerate this.  She says she has a bit of numbness in her left leg, especially if she sits too long, but she does not feel that this warrants a Pain Clinic visit at this time.  At any rate, she has our contact information and is welcome to call us if things change.  Therefore, we discharged her from clinic.      It was my pleasure to see her today.      Sincerely,      Sallie Reynolds MD      This video visit took approximately 10 minutes.         SALLIE REYNOLDS MD             D: 2020   T: 2020   MT: christiano      Name:     MINISTERIO BAGLEY   MRN:      [id number]        Account:      [id number]   :      1985           Service Date: 2020      Document: [id number]

## 2020-05-26 NOTE — PROGRESS NOTES
"Santos Bagley is a 35 year old female who is being evaluated via a billable video visit.      The patient has been notified of following:     \"This video visit will be conducted via a call between you and your physician/provider. We have found that certain health care needs can be provided without the need for an in-person physical exam.  This service lets us provide the care you need with a video conversation.  If a prescription is necessary we can send it directly to your pharmacy.  If lab work is needed we can place an order for that and you can then stop by our lab to have the test done at a later time.    Video visits are billed at different rates depending on your insurance coverage.  Please reach out to your insurance provider with any questions.    If during the course of the call the physician/provider feels a video visit is not appropriate, you will not be charged for this service.\"    Patient has given verbal consent for Video visit? YES    How would you like to obtain your AVS? Mail    Patient would like the video invitation sent by: Text [phone]    Will anyone else be joining your video visit? no        Video-Visit Details    Type of service:  Video Visit    Video Start Time:   Video End Time:     Originating Location (pt. Location): Home    Distant Location (provider location):  [facility] NEUROSURGERY     Platform used for Video Visit: Rica olsen          "

## 2020-05-26 NOTE — LETTER
"2020       RE: Santos Bagley  [address]  [place] [facility] [address]     Dear Colleague,    Thank you for referring your patient, Santos Bagley, to the [facility] NEUROSURGERY at [facility]. Please see a copy of my visit note below.    Santos Bagley is a 35 year old female who is being evaluated via a billable video visit.      The patient has been notified of following:     \"This video visit will be conducted via a call between you and your physician/provider. We have found that certain health care needs can be provided without the need for an in-person physical exam.  This service lets us provide the care you need with a video conversation.  If a prescription is necessary we can send it directly to your pharmacy.  If lab work is needed we can place an order for that and you can then stop by our lab to have the test done at a later time.    Video visits are billed at different rates depending on your insurance coverage.  Please reach out to your insurance provider with any questions.    If during the course of the call the physician/provider feels a video visit is not appropriate, you will not be charged for this service.\"    Patient has given verbal consent for Video visit? YES    How would you like to obtain your AVS? Mail    Patient would like the video invitation sent by: Text [phone]    Will anyone else be joining your video visit? no        Video-Visit Details    Type of service:  Video Visit    Video Start Time:   Video End Time:     Originating Location (pt. Location): Home    Distant Location (provider location):  [facility] NEUROSURGERY     Platform used for Video Visit: Rica olsen      Service Date: 2020      RE: Santos Bagley   MRN: [id number]   : 1985      Dear Doctor:      Ms. Bagley was last seen in Neurosurgery Clinic on 2019 for a 3-month postoperative visit.  She had a left minimally invasive L5-S1 hemilam microdisk for radiculopathy.  " She struggled a little bit after the surgery, but at the 3-month jarrett, she was doing much better.  Her left-sided radicular pain had completely resolved and she had no neurological issues.  She did have some ongoing back pain but had started physical therapy and this was improving.  At that visit, we said that if after another 3 months of physical therapy she had continued back pain she could call us and we could refer her to the Pain Clinic.  That was the last time that we saw her.      Today we did a 1-year check in with her.  She is doing very well and she is very happy with the results of the surgery.  She says that she has a little back pain now and then, but overall, she can tolerate this.  She says she has a bit of numbness in her left leg, especially if she sits too long, but she does not feel that this warrants a Pain Clinic visit at this time.  At any rate, she has our contact information and is welcome to call us if things change.  Therefore, we discharged her from clinic.      It was my pleasure to see her today.      Sincerely,      Sallie Reynolds MD      This video visit took approximately 10 minutes.         SALLIE REYNOLDS MD             D: 2020   T: 2020   MT: christiano      Name:     MINISTERIO BURRELL   MRN:      [id number]        Account:      [id number]   :      1985           Service Date: 2020      Document: [id number]

## 2020-11-17 ENCOUNTER — TELEPHONE (OUTPATIENT)
Dept: NEUROSURGERY | Facility: CLINIC | Age: 35
End: 2020-11-17

## 2020-11-17 NOTE — TELEPHONE ENCOUNTER
Writer received inbasket from Jaida Lamb LPN that was forwarded to her via the call center. Call came in to call center from [place] [employer] looking for paperwork that they had reportedly faxed four times on the following dates:  first on 6/6/19, again on 9/6/19, again on 2/2/20, and again on 5/27/20. Per our records we received paperwork from them, filled it out and faxed it back on 9/26/2019 and 3/2020. It should be noted that no new paperwork has come into [employer] since those dates. Writer faxed paperwork for the 3rd time to [place] [employer] @ [phone].

## 2021-06-15 ENCOUNTER — TELEPHONE (OUTPATIENT)
Dept: NEUROSURGERY | Facility: CLINIC | Age: 36
End: 2021-06-15

## 2021-06-15 NOTE — TELEPHONE ENCOUNTER
Writer faxed FMLA form to [facility] ATTN: Carlene @ [phone]. Writer also scanned a copy into the patient's EMR.     JOE Frederick  Neurosurgery nurse.

## 2022-05-10 NOTE — MR AVS SNAPSHOT
After Visit Summary   12/3/2018    Santos Bagley    MRN: [id number]           Patient Information     Date Of Birth          1985        Visit Information        Provider Department      12/3/2018 4:00 PM Gina Maldonado APRN [facility]        Today's Diagnoses     Lumbosacral disc herniation    -  1    Type 2 diabetes mellitus without complication, without long-term current use of insulin (H)        BMI 40.0-44.9, adult (H)          Care Instructions    Aim for 1700 calories per day  Aim for 150-165 grams grams of carbs per day.      [facility]    If you have any questions regarding to your visit please contact your care team:     Team Pink:   Clinic Hours Telephone Number   Internal Medicine:  Dr. Tierra Maldonado, NP 7am-7pm  Monday - Thursday   7am-5pm  Fridays  [phone]  (Appointment scheduling available 24/7)   Urgent Care - [facility] - 11am-9pm Monday-Friday Saturday-Sunday- 9am-5pm   [place] - 5pm-9pm Monday-Friday Saturday-Sunday- 9am-5pm  [phone] - [place]  [phone] - [place]       What options do I have for a visit other than an office visit? We offer electronic visits (e-visits) and telephone visits, when medically appropriate.  Please check with your medical insurance to see if these types of visits are covered, as you will be responsible for any charges that are not paid by your insurance.      You can use [employer] (secure electronic communication) to access to your chart, send your primary care provider a message, or make an appointment. Ask a team member how to get started.     For a price quote for your services, please call our Consumer Price Line at [phone] or our Imaging Cost estimation line at [phone] (for imaging tests).  Josefa THOMAS CMA ([facility])            Follow-ups after your visit        Additional Services     DIABETES EDUCATOR REFERRAL        "CC:    wheezing    HPI:    60 y/o AAF w/ h/o recent SBO requiring surgery in July, lifetime non-smoker, who has had episodic wheezing waking her up at night since the Spring.  Initially this was quite alarming but has improved to some degree.  She does exercise, and does not notice any wheezing, chest tightness, shortness of breath,etc.  No typical sounding asthmatic triggers.  No GERD symptoms.  When she wakes up she is not short of breath but can hear an audible \"wheezing\" sound coming from upper airway.  Hasn't had overt allergies.  Has had some dry cough and feels like she can't get it up.  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    INTERVAL HISTORY:    Patient returns today for follow-up.  She tried the Flonase and Allegra, this did not seem to make any difference.  She did then tried Prilosec which upset her stomach.  Still continues to have an intermittent cough and feeling like there is mucus stuck in her upper airway.  She does sometimes get worse coughing around certain chemicals and smoke smells.    PMH:    Past Medical History:   Diagnosis Date   • Allergic rhinitis    • Asthma 2/1/21    wheezing   • Prediabetes    • Small bowel obstruction (HCC) 07/08/2021    DIAGNOSTIC LAPAROSCOPY, LYSIS OF ADHESIONS   • Vitamin D deficiency      PSH:    Past Surgical History:   Procedure Laterality Date   • COLONOSCOPY     • DIAGNOSTIC LAPAROSCOPY N/A 7/8/2021    Procedure: DIAGNOSTIC LAPAROSCOPY, LYSIS OF ADHESIONS;  Surgeon: Estevan Narayanan MD;  Location: [facility];  Service: General;  Laterality: N/A;   • HYSTERECTOMY     • OOPHORECTOMY      unilateral     FH:    Family History   Problem Relation Age of Onset   • Breast cancer Paternal Aunt    • Hypertension Mother    • Stroke Mother    • Diabetes Grandchild    • Arthritis Father    • Ovarian cancer Neg Hx    • Colon cancer Neg Hx    • Heart attack Neg Hx      SH:    Social History     Socioeconomic History   • Marital status: Single " "  Tobacco Use   • Smoking status: Never Smoker   • Smokeless tobacco: Never Used   Vaping Use   • Vaping Use: Never used   Substance and Sexual Activity   • Alcohol use: No   • Drug use: Never   • Sexual activity: Not Currently     Partners: Male     Birth control/protection: None     ALLERGIES:    No Known Allergies  MEDICATIONS:      Current Outpatient Medications:   •  albuterol sulfate  (90 Base) MCG/ACT inhaler, Inhale 2 puffs Every 6 (Six) Hours As Needed., Disp: , Rfl:   ROS:  Per HPI, otherwise all systems reviewed and negative.    DIAGNOSTIC DATA (Reviewed and interpreted by me unless otherwise specified):    CT Chest 10/25/21 - scattered granulomas otherwise normal appearing lung parenchyma    PFT 11/24/21 - mild restriction, no obstruction, mild reduction in DLCO overcorrects, low ERV.  Overall \"normal\" PFT when accounting for BMI / body habitus.    Vitals:    05/10/22 1536   BP: 138/76   Pulse: 84   Temp: 97.5 °F (36.4 °C)   SpO2: 98%       Physical Exam   Constitutional: Oriented to person, place, and time. Appears well-developed and well-nourished.   Head: Normocephalic and atraumatic.   Nose: Nose normal.   Mouth/Throat: Oropharynx with cobblestonning  Eyes: Conjunctivae are normal.  Pupils normal.  Neck: No tracheal deviation present.   Cardiovascular: Normal rate, regular rhythm, normal heart sounds and intact distal pulses.  Exam reveals no gallop and no friction rub.  No thrill.  No JVD.  No edema.  No murmur heard.  Pulmonary/Chest: Effort normal and breath sounds normal.  No tenderness to palpation.  No clubbing.   Abdominal: Soft. Bowel sounds are normal. No distension. No tenderness. There is no guarding.   Musculoskeletal: Normal range of motion.  No tenderness.  Lymphadenopathy:  No cervical adenopathy.   Neurological:  No new focal neurological deficits observed   Skin: Skin is warm and dry. No rash noted.   Psychiatric: Normal mood and affect.  Behavior is normal. Judgment " DIABETES SELF MANAGEMENT TRAINING (DSMT)      Your provider has referred you to Diabetes Education: FMG: Diabetes Education - All [facility] [phone]   [email or web address]     If an urgent visit is needed or A1C is above 12, Care Team to call the Diabetes  Education Team at [phone] or send an In Basket message to the Diabetes Education Pool (P DIAB ED-PATIENT CARE).    A  will call you to make your appointment. If it has been more than 3 business days since your referral was placed, please call the above phone number to schedule.    Type of training and number of hours: New Diagnosis: Initial group DSMT - 10 hours.      Diabetes Type: Type 2 - On Insulin   Medicare covers: 10 hours of initial DSMT in 12 month period from the time of first visit, plus 2 hours of follow-up DSMT annually, and additional hours as requested for insulin training.         Diabetes Co-Morbidities: none               A1C Goal:  <7.0       A1C is: Lab Results       Component                Value               Date                       A1C                      6.5                 09/25/2018              MEDICAL NUTRITION THERAPY (MNT) for Diabetes    Medical Nutrition Therapy with a Registered Dietitian can be provided in coordination with Diabetes Self-Management Training to assist in achieving optimal diabetes management.    MNT Type and Hours: New diagnosis: Initial MNT - 3 hours                       Medicare will cover: 3 hours initial MNT in 12 month period after first visit, plus 2 hours of follow-up MNT annually        Diabetes Education Topics: Comprehensive Knowledge Assessment and Instruction, Knowledge: Healthy Eating, Being Active, Monitoring Blood Sugar, Taking Medication and Healthy Coping, Blood glucose meter instruction  and Medication Start: GLP-1: Type/Dose/Timing: semaglutide    Special Educational Needs Requiring Individual DSMT: None      Please be  aware that coverage of these services is subject to the terms and limitations of your health insurance plan.  Call member services at your health plan to determine Diabetes Self-Management Training (Codes  and ) and Medical Nutrition Therapy (Codes 86703 and 71728) benefits and ask which blood glucose monitor brands are covered by your plan.  Please bring the following with you to your appointment:    (1)  List of current medications   (2)  List of Blood Glucose Monitor brands that are covered by your insurance plan  (3)  Blood Glucose Monitor and log book  (4)   Food records for the 3 days prior to your visit    The Certified Diabetes Educator may make diabetes medication adjustments per the CDE Protocol and Collaborative Practice Agreement.                  Follow-up notes from your care team     Return in about 4 weeks (around 12/31/2018) for Medication Recheck, Weight Recheck.      Who to contact     If you have questions or need follow up information about today's clinic visit or your schedule please contact [facility] directly at [phone].  Normal or non-critical lab and imaging results will be communicated to you by [employer], letter or phone within 4 business days after the clinic has received the results. If you do not hear from us within 7 days, please contact the clinic through [employer] or phone. If you have a critical or abnormal lab result, we will notify you by phone as soon as possible.  Submit refill requests through [employer] or call your pharmacy and they will forward the refill request to us. Please allow 3 business days for your refill to be completed.          Additional Information About Your Visit        [employer] Information     [employer] gives you secure access to your electronic health record. If you see a primary care provider, you can also send messages to your care team and make appointments. If you have questions, please call your primary care clinic.  If you do not  normal.    Assessment/Plan     1)  Nocturnal Wheezing  2)  Chronic Cough    Patient did not improve with treatment of allergic rhinitis/postnasal drip, and GERD.  She could have cough variant asthma or mild persistent asthma.  We will give trial of Breo 200 1 puff daily for 14 days.  By then she should see some improvement if this is related to asthma.  If not I think she would benefit from referral to ENT since her symptoms localized to the upper airway.    RTC 6 months    Sachin Cordova MD  Pulmonology and Critical Care Medicine  05/10/22 16:15 EDT  Electronically Signed    C.C.:  No ref. provider found, Ciara Ruff MD         "have a primary care provider, please call [phone] and they will assist you.        Care EveryWhere ID     This is your Care EveryWhere ID. This could be used by other organizations to access your [place] medical records  [id number]        Your Vitals Were     Pulse Temperature Respirations Height Pulse Oximetry BMI (Body Mass Index)    92 99  F (37.2  C) (Oral) 20 5' 1\" (1.549 m) 99% 41.08 kg/m2       Blood Pressure from Last 3 Encounters:   12/03/18 126/70   11/05/18 120/76   09/25/18 131/86    Weight from Last 3 Encounters:   12/03/18 217 lb 6.4 oz (98.6 kg)   11/05/18 223 lb 12.8 oz (101.5 kg)   09/25/18 225 lb 12.8 oz (102.4 kg)              We Performed the Following     DIABETES EDUCATOR REFERRAL          Today's Medication Changes          These changes are accurate as of 12/3/18  4:44 PM.  If you have any questions, ask your nurse or doctor.               Start taking these medicines.        Dose/Directions    insulin pen needle 31G X 6 MM miscellaneous   Commonly known as:  31G X 6 MM   Used for:  Type 2 diabetes mellitus without complication, without long-term current use of insulin (H), BMI 40.0-44.9, adult (H)   Started by:  Gina Maldonado APRN CNP        Use once weekly or as directed.   Quantity:  30 each   Refills:  1       Semaglutide 0.25 or 0.5 MG/DOSE Sopn   Used for:  Type 2 diabetes mellitus without complication, without long-term current use of insulin (H), BMI 40.0-44.9, adult (H)   Started by:  Gina Maldonado APRN CNP        Dose:  0.25 mg   Inject 0.25 mg Subcutaneous every 7 days   Quantity:  1 pen   Refills:  0         These medicines have changed or have updated prescriptions.        Dose/Directions    gabapentin 100 MG capsule   Commonly known as:  NEURONTIN   This may have changed:    - how much to take  - how to take this  - when to take this  - additional instructions   Used for:  Lumbosacral disc herniation   Changed by:  Gina Maldonado APRN " CNP        Dose:  100 mg   Take 1 capsule (100 mg) by mouth 2 times daily   Quantity:  180 capsule   Refills:  1            Where to get your medicines      These medications were sent to [facility] - [address]  [address] [address] [address]     Phone:  [phone]     gabapentin 100 MG capsule    insulin pen needle 31G X 6 MM miscellaneous    Semaglutide 0.25 or 0.5 MG/DOSE Sopn                Primary Care Provider Office Phone # Fax #    Gina Saunders Marty Nolan, CIPRIANO [facility] [phone] [address]  [place] [facility] [place] [address]        Equal Access to Services     [facility] : Hadii adrianne valero hadasho Soilana, waaxda luqadaha, qaybta kaalmada adeegyada, altagracia madera . So [facility] [phone].    ATENCIÓN: Si habla español, tiene a saxena disposición servicios gratuitos de asistencia lingüística. Llame al [phone].    We comply with applicable federal civil rights laws and [place] laws. We do not discriminate on the basis of race, color, national origin, age, disability, sex, sexual orientation, or gender identity.            Thank you!     Thank you for choosing [facility]  for your care. Our goal is always to provide you with excellent care. Hearing back from our patients is one way we can continue to improve our services. Please take a few minutes to complete the written survey that you may receive in the mail after your visit with us. Thank you!             Your Updated Medication List - Protect others around you: Learn how to safely use, store and throw away your medicines at www.disposemymeds.org.          This list is accurate as of 12/3/18  4:44 PM.  Always use your most recent med list.                   Brand Name Dispense Instructions for use Diagnosis    gabapentin 100 MG capsule    NEURONTIN    180 capsule    Take 1 capsule (100 mg) by mouth 2 times daily    Lumbosacral disc herniation        IBUPROFEN PO      Take 800 mg by mouth every 6 hours        insulin pen needle 31G X 6 MM miscellaneous    31G X 6 MM    30 each    Use once weekly or as directed.    Type 2 diabetes mellitus without complication, without long-term current use of insulin (H), BMI 40.0-44.9, adult (H)       lidocaine 5 % patch    LIDODERM    30 patch    Apply up to 3 patches to painful area at once for up to 12 h within a 24 h period.  Remove after 12 hours.    Lumbar pain with radiation down left leg       Semaglutide 0.25 or 0.5 MG/DOSE Sopn     1 pen    Inject 0.25 mg Subcutaneous every 7 days    Type 2 diabetes mellitus without complication, without long-term current use of insulin (H), BMI 40.0-44.9, adult (H)       TYLENOL PO      Take by mouth 3 times daily

## 2022-10-07 NOTE — MR AVS SNAPSHOT
After Visit Summary   7/11/2018    Santos Bagley    MRN: [id number]           Patient Information     Date Of Birth          1985        Visit Information        Provider Department      7/11/2018 9:45 AM Corinne Whatley MD [facility]        Care Instructions    [facility] Procedure Discharge Instructions    Today you saw:   Dr. Corinne Whatley           Your procedure: Lumbar Epidural steroid injection       Medications used:  Lidocaine (anesthetic)   Dexamethasone (steroid)   Omnipaque (contrast)           Be cautious when walking as numbness and/or weakness in the legs may occur up to 6-8 hours after the procedure due to effect of the local anesthetic    Do not drive for 6 hours. The effect of the local anesthetic could slow your reflexes.     Avoid strenuous activity for the first 24 hours. You may resume your regular activities after that.     You may shower, however avoid swimming, tub baths or hot tubs for 24 hours following your procedure    You may have a mild to moderate increase in pain for several days following the injection.      You may use ice packs for 10-15 minutes, 3 to 4 times a day at the injection site for comfort    Do not use heat to painful areas for 6 to 8 hours. This will give the local anesthetic time to wear off and prevent you from accidentally burning your skin.    You may use anti-inflammatory medications (such as Ibuprofen/Advil or Aleve) or Tylenol for pain control if necessary    With diabetes, check your blood sugar more frequently than usual as your blood sugar may be higher than normal for 10-14 days following a steroid injection. Contact your doctor who manages your diabetes if your blood sugar is higher than usual    It may take up to 14 days for the steroid medication to start working although you may feel the effect as early as a few days after the procedure.     Follow up with your referring provider in 2-3 weeks      If you  Discussed presence of mild ERM. Recommended observation for now. Can consider surgical intervention in the future if the ERM progresses and the patient becomes more symptomatic. experience any of the following, call the pain center line during work hours at [phone] or on-call physician after hours at [phone]:  -Fever over 100 degree F  -Swelling, bleeding, redness, drainage, warmth at the injection site  -Progressive weakness or numbness in your legs or arms  -Loss of bowel or bladder function  -Unusual headache that is not relieved by Tylenol or your regular headache medication  -Unusual new onset of pain that is not improving    Phone #s:  Nurse triage line for general questions: [phone]          Follow-ups after your visit        Who to contact     If you have questions or need follow up information about today's clinic visit or your schedule please contact [facility] directly at [phone].  Normal or non-critical lab and imaging results will be communicated to you by [employer], letter or phone within 4 business days after the clinic has received the results. If you do not hear from us within 7 days, please contact the clinic through [employer] or phone. If you have a critical or abnormal lab result, we will notify you by phone as soon as possible.  Submit refill requests through [employer] or call your pharmacy and they will forward the refill request to us. Please allow 3 business days for your refill to be completed.          Additional Information About Your Visit        [employer] Information     [employer] gives you secure access to your electronic health record. If you see a primary care provider, you can also send messages to your care team and make appointments. If you have questions, please call your primary care clinic.  If you do not have a primary care provider, please call [phone] and they will assist you.        Care EveryWhere ID     This is your Care EveryWhere ID. This could be used by other organizations to access your [place] medical records  [id number]        Your Vitals Were     Pulse Pulse Oximetry                87 96%           Blood  Pressure from Last 3 Encounters:   07/11/18 126/87   06/26/18 (!) 133/91   06/18/18 116/82    Weight from Last 3 Encounters:   06/26/18 102.2 kg (225 lb 3.2 oz)   06/18/18 100.2 kg (221 lb)   06/08/18 100.7 kg (222 lb)              Today, you had the following     No orders found for display       Primary Care Provider Office Phone # Fax #    Gina Maldonado, APRN [facility] [phone]       [address]  [place] [address]        Equal Access to Services     [facility]: Hadii adrianne valero hadasho Soomaali, waaxda luqadaha, qaybta kaalmada aderobertayada, altagracia madera . So [facility] [phone].    ATENCIÓN: Si habla español, tiene a saxena disposición servicios gratuitos de asistencia lingüística. Llame al [phone].    We comply with applicable federal civil rights laws and [place] laws. We do not discriminate on the basis of race, color, national origin, age, disability, sex, sexual orientation, or gender identity.            Thank you!     Thank you for choosing [facility]  for your care. Our goal is always to provide you with excellent care. Hearing back from our patients is one way we can continue to improve our services. Please take a few minutes to complete the written survey that you may receive in the mail after your visit with us. Thank you!             Your Updated Medication List - Protect others around you: Learn how to safely use, store and throw away your medicines at www.disposemymeds.org.          This list is accurate as of 7/11/18 10:04 AM.  Always use your most recent med list.                   Brand Name Dispense Instructions for use Diagnosis    diclofenac 50 MG EC tablet    VOLTAREN    90 tablet    Take 1 tablet (50 mg) by mouth 3 times daily as needed for moderate pain (take with food)    Lumbar pain with radiation down left leg       IBUPROFEN PO      Take 800 mg by mouth every 6 hours        lidocaine 5 % Patch    LIDODERM    30  patch    Apply up to 3 patches to painful area at once for up to 12 h within a 24 h period.  Remove after 12 hours.    Lumbar pain with radiation down left leg       methocarbamol 750 MG tablet    ROBAXIN    60 tablet    Take 1 tablet (750 mg) by mouth 3 times daily as needed for muscle spasms    Lumbar pain with radiation down left leg       nabumetone 500 MG tablet    RELAFEN    60 tablet    Take 1-2 tablets (500-1,000 mg) by mouth 2 times daily as needed for moderate pain    Lumbosacral disc herniation       NAPROXEN PO           predniSONE 20 MG tablet    DELTASONE    5 tablet    Take 1 tablet (20 mg) by mouth daily    Lumbar radiculopathy, Lumbar disc herniation       TYLENOL PO      Take by mouth 3 times daily

## (undated) DEVICE — LINEN TOWEL PACK X6 WHITE 5487

## (undated) DEVICE — NDL BLUNT 18GA 1" W/O FILTER 305181

## (undated) DEVICE — DRAPE STERI TOWEL LG 1010

## (undated) DEVICE — LINEN TOWEL PACK X30 5481

## (undated) DEVICE — DRAPE MAYO STAND 23X54 8337

## (undated) DEVICE — DECANTER VIAL 2006S

## (undated) DEVICE — GLOVE PROTEXIS MICRO 7.0  2D73PM70

## (undated) DEVICE — SOL NACL 0.9% IRRIG 1000ML BOTTLE 2F7124

## (undated) DEVICE — SYR 10ML LL W/O NDL 302995

## (undated) DEVICE — SPONGE COTTONOID 1/2X1/2" 80-1400

## (undated) DEVICE — GLOVE PROTEXIS BLUE W/NEU-THERA 7.0  2D73EB70

## (undated) DEVICE — PREP POVIDONE IODINE SOLUTION 10% 4OZ

## (undated) DEVICE — DRAPE MICROSCOPE LEICA 54X150" AR8033650

## (undated) DEVICE — PREP POVIDONE IODINE SCRUB 7.5% 4OZ APL82212

## (undated) DEVICE — DRSG PRIMAPORE 03 1/8X6" 66000318

## (undated) DEVICE — KNIFE MEDT BAYONETTED 134MM 1563-00

## (undated) DEVICE — SU MONOCRYL 4-0 PS-2 27" UND Y426H

## (undated) DEVICE — BUR MATCHSTICK 3.0MM FLUTED 15CM ANSPACH MA15-8NS

## (undated) DEVICE — PREP CHLORAPREP CLEAR 3ML 260400

## (undated) DEVICE — SU VICRYL 0 UR-6 27" J603H

## (undated) DEVICE — SYR 30ML LL W/O NDL 302832

## (undated) DEVICE — PREP SKIN SCRUB TRAY 4461A

## (undated) DEVICE — NDL SPINAL 18GA 3.5" 405184

## (undated) DEVICE — SUCTION MANIFOLD DORNOCH ULTRA CART UL-CL500

## (undated) DEVICE — DRAPE C-ARM W/STRAPS 42X72" 07-CA104

## (undated) DEVICE — WIPES FOLEY CARE SURESTEP PROVON DFC100

## (undated) DEVICE — SPONGE SURGIFOAM 01GM POWDER 1978

## (undated) DEVICE — CATH TRAY FOLEY SURESTEP 16FR W/TMP PRB STLK LATEX A319416AM

## (undated) DEVICE — ESU ELEC BLADE 6" COATED E1450-6

## (undated) DEVICE — PACK NEURO MINOR UMMC SNE32MNMU4

## (undated) DEVICE — SPONGE SURGIFOAM 100 1974

## (undated) DEVICE — SU VICRYL 2-0 CT-2 CR 8X18" J726D

## (undated) DEVICE — NDL BLUNT 17GA 1.5" 8881202330

## (undated) RX ORDER — GABAPENTIN 300 MG/1
CAPSULE ORAL
Status: DISPENSED
Start: 2019-02-05

## (undated) RX ORDER — SODIUM CHLORIDE, SODIUM LACTATE, POTASSIUM CHLORIDE, CALCIUM CHLORIDE 600; 310; 30; 20 MG/100ML; MG/100ML; MG/100ML; MG/100ML
INJECTION, SOLUTION INTRAVENOUS
Status: DISPENSED
Start: 2019-02-05

## (undated) RX ORDER — CEFAZOLIN SODIUM 2 G/100ML
INJECTION, SOLUTION INTRAVENOUS
Status: DISPENSED
Start: 2019-02-05

## (undated) RX ORDER — ACETAMINOPHEN 325 MG/1
TABLET ORAL
Status: DISPENSED
Start: 2019-02-05

## (undated) RX ORDER — BUPIVACAINE HYDROCHLORIDE AND EPINEPHRINE 5; 5 MG/ML; UG/ML
INJECTION, SOLUTION EPIDURAL; INTRACAUDAL; PERINEURAL
Status: DISPENSED
Start: 2019-02-05

## (undated) RX ORDER — PHENYLEPHRINE HCL IN 0.9% NACL 1 MG/10 ML
SYRINGE (ML) INTRAVENOUS
Status: DISPENSED
Start: 2019-02-05

## (undated) RX ORDER — FENTANYL CITRATE 50 UG/ML
INJECTION, SOLUTION INTRAMUSCULAR; INTRAVENOUS
Status: DISPENSED
Start: 2019-02-05

## (undated) RX ORDER — ONDANSETRON 2 MG/ML
INJECTION INTRAMUSCULAR; INTRAVENOUS
Status: DISPENSED
Start: 2019-02-05

## (undated) RX ORDER — HYDROMORPHONE HYDROCHLORIDE 1 MG/ML
INJECTION, SOLUTION INTRAMUSCULAR; INTRAVENOUS; SUBCUTANEOUS
Status: DISPENSED
Start: 2019-02-05

## (undated) RX ORDER — BACITRACIN 50000 [IU]/1
INJECTION, POWDER, FOR SOLUTION INTRAMUSCULAR
Status: DISPENSED
Start: 2019-02-05